# Patient Record
Sex: FEMALE
[De-identification: names, ages, dates, MRNs, and addresses within clinical notes are randomized per-mention and may not be internally consistent; named-entity substitution may affect disease eponyms.]

---

## 2019-10-11 ENCOUNTER — APPOINTMENT (OUTPATIENT)
Dept: HOME HEALTH SERVICES | Facility: HOME HEALTH | Age: 84
End: 2019-10-11

## 2019-10-16 ENCOUNTER — APPOINTMENT (OUTPATIENT)
Dept: HOME HEALTH SERVICES | Facility: HOME HEALTH | Age: 84
End: 2019-10-16
Payer: MEDICARE

## 2019-10-16 VITALS
DIASTOLIC BLOOD PRESSURE: 80 MMHG | WEIGHT: 103 LBS | OXYGEN SATURATION: 98 % | HEART RATE: 61 BPM | HEIGHT: 59 IN | SYSTOLIC BLOOD PRESSURE: 152 MMHG | BODY MASS INDEX: 20.76 KG/M2 | TEMPERATURE: 97.7 F | RESPIRATION RATE: 14 BRPM

## 2019-10-16 DIAGNOSIS — Z81.8 FAMILY HISTORY OF OTHER MENTAL AND BEHAVIORAL DISORDERS: ICD-10-CM

## 2019-10-16 DIAGNOSIS — Z63.4 DISAPPEARANCE AND DEATH OF FAMILY MEMBER: ICD-10-CM

## 2019-10-16 DIAGNOSIS — M47.812 SPONDYLOSIS W/OUT MYELOPATHY OR RADICULOPATHY, CERVICAL REGION: ICD-10-CM

## 2019-10-16 DIAGNOSIS — Z82.3 FAMILY HISTORY OF STROKE: ICD-10-CM

## 2019-10-16 PROCEDURE — 99343: CPT | Mod: 25

## 2019-10-16 PROCEDURE — 90686 IIV4 VACC NO PRSV 0.5 ML IM: CPT

## 2019-10-16 PROCEDURE — G0506: CPT

## 2019-10-16 PROCEDURE — G0008: CPT

## 2019-10-16 SDOH — SOCIAL STABILITY - SOCIAL INSECURITY: DISSAPEARANCE AND DEATH OF FAMILY MEMBER: Z63.4

## 2019-10-16 NOTE — REASON FOR VISIT
[Initial Eval - Existing Diagnosis] : an initial evaluation of an existing diagnosis [Family Member] : family member [Formal Caregiver] : formal caregiver

## 2019-10-16 NOTE — COUNSELING
[Normal Weight - ( BMI  <25 )] : normal weight - ( BMI  <25 ) [Continue diet as tolerated] : continue diet as tolerated based on goals of care [Non - Smoker] : non-smoker [] : colonoscopy [Minimize unnecessary interventions] : minimize unnecessary interventions [Maintain functional ability] : maintain functional ability [Discussed disease trajectory with patient/caregiver] : discussed disease trajectory with patient/caregiver [Patient/Caregiver has ___ understanding of disease process] : patient/caregiver has [unfilled] understanding of disease process [Completed Medical Orders for Life-Sustaining Treatment] : completed medical orders for life-sustaining treatment [DNR] : Code Status: DNR [Comfort] : Treatment Guidelines: Comfort [DNI] : Intubation: DNI

## 2019-10-17 PROBLEM — M47.812 OSTEOARTHRITIS OF NECK: Status: ACTIVE | Noted: 2019-10-16

## 2019-10-17 NOTE — PHYSICAL EXAM
[No Acute Distress] : no acute distress [Well Developed] : well developed [Normal Sclera/Conjunctiva] : normal sclera/conjunctiva [Normal Outer Ear/Nose] : the ears and nose were normal in appearance [Normal Oropharynx] : the oropharynx was normal [No JVD] : no jugular venous distention [Supple] : the neck was supple [No Respiratory Distress] : no respiratory distress [Clear to Auscultation] : lungs were clear to auscultation bilaterally [No Accessory Muscle Use] : no accessory muscle use [Normal Rate] : heart rate was normal  [Regular Rhythm] : with a regular rhythm [Normal S1, S2] : normal S1 and S2 [No Murmurs] : no murmurs heard [No Edema] : there was no peripheral edema [Normal Appearance] : normal in appearance [No Nipple Discharge] : no nipple discharge [Normal Bowel Sounds] : normal bowel sounds [Non Tender] : non-tender [Soft] : abdomen soft [Not Distended] : not distended [No Masses] : no abdominal mass palpated [No Rash] : no rash [Normal Affect] : the affect was normal [de-identified] : Thin, well-groomed [de-identified] : Hard of hearing, few broken teeth [de-identified] : +Few senile purpura upper chest [de-identified] : Unsteady gait [de-identified] : F [de-identified] : A+O x 2 (person, place)

## 2019-10-17 NOTE — HEALTH RISK ASSESSMENT
[No falls in past year] : Patient reported no falls in the past year [No] : The patient does not have visual impairment [HRA Reviewed] : Health risk assessment reviewed [Independent] : feeding [Some assistance needed] : using telephone [Full assistance needed] : managing finances [de-identified] : using walker (per dtr typically declines use of walker and walks faster without it) [TimeGetUpGo] : 45

## 2019-10-17 NOTE — HISTORY OF PRESENT ILLNESS
[Patient] : patient [FreeTextEntry1] : Gait instability, dementia [FreeTextEntry2] : 89yo female with h/o mild dementia (since 2001, declined medications for this), HTN, anxiety/depression (not on medications, was on xanax in past but had adverse reaction), CAD s/p 2 stents (2003, [facility], not on ASA or plavix), gait instability (uses walker), hearing impairment (declined hearing aides), being seen for initial [facility] visit.\par \par Last hospitalization was in 7/2018 for fall, was getting out of bed alone and fell. She had an ER visit 7/2019 for headache. Last labs were done in [facility]. \par \par Presently she is feeling well. She spends time cleaning home, has aide as well. She is originally from [place], worked as seamstress. She is , two of her adult children live nearby. She has limited appetite, eats healthy diet, limited meat, fruits and vegetables. No constipation or diarrhea, no urinary incontinence. No chest pain or SOB. Has had rash - small red spots intermittently on chest and back. No swelling. She has not fallen since 7/2018, uses rolling walker intermittently, sometimes carries walker around instead of using for stability. Sleeps well except when getting up to urinate.\par \par Has h/o anxiety and depression - has had whole adult life. She cries at night often. Per dtr, pt used to keep herself extremely occupied, walking long distances, cleaning, to deal w anxiety. No impact on sleep, no impact on guilt, energy good, enjoys being with family. \par \par Dtr notes memory problems starting 2001, pt started wandering at that time, has been rec to take meds for memory, pt averse to medications and declined.\par _______________________________________\par \par [facility] results 7/3/19: \par \par Head CT: Mild atrophy, mild to mod small vessel ischemic changes, intracranial arterial calcifications\par WBC 9.0\par Hgb 15.3\par Plt 169\par INR 0.99\par Na+ 133\par K+ 4.4\par Cl 99\par CO2 26\par BUN 23\par Cr 0.9\par eGFR 63\par B12 567\par

## 2019-11-15 ENCOUNTER — TRANSCRIPTION ENCOUNTER (OUTPATIENT)
Age: 84
End: 2019-11-15

## 2019-11-15 DIAGNOSIS — Z86.69 PERSONAL HISTORY OF OTHER DISEASES OF THE NERVOUS SYSTEM AND SENSE ORGANS: ICD-10-CM

## 2019-12-02 ENCOUNTER — APPOINTMENT (OUTPATIENT)
Dept: HOME HEALTH SERVICES | Facility: HOME HEALTH | Age: 84
End: 2019-12-02

## 2020-01-10 ENCOUNTER — APPOINTMENT (OUTPATIENT)
Dept: HOME HEALTH SERVICES | Facility: HOME HEALTH | Age: 85
End: 2020-01-10
Payer: MEDICARE

## 2020-01-10 VITALS
SYSTOLIC BLOOD PRESSURE: 180 MMHG | TEMPERATURE: 98.8 F | RESPIRATION RATE: 12 BRPM | HEART RATE: 82 BPM | OXYGEN SATURATION: 98 % | DIASTOLIC BLOOD PRESSURE: 110 MMHG

## 2020-01-10 VITALS — SYSTOLIC BLOOD PRESSURE: 168 MMHG | DIASTOLIC BLOOD PRESSURE: 100 MMHG

## 2020-01-10 PROCEDURE — 99349 HOME/RES VST EST MOD MDM 40: CPT

## 2020-01-10 RX ORDER — OFLOXACIN 3 MG/ML
0.3 SOLUTION/ DROPS OPHTHALMIC 4 TIMES DAILY
Qty: 1 | Refills: 0 | Status: DISCONTINUED | COMMUNITY
Start: 2019-11-15 | End: 2020-01-10

## 2020-01-10 NOTE — CURRENT MEDS
[Medication and Allergies Reconciled] : medication and allergies reconciled [Reviewed patient reported medication adherence from Comprehensive Assessment] : Reviewed patient reported medication adherence from comprehensive assessment [High Risk Medications Reviewed and Reconciled (Beers Criteria)] : high risk medications reviewed and reconciled [Adherent to medications] : Patient is adherent to medications as prescribed

## 2020-01-10 NOTE — HISTORY OF PRESENT ILLNESS
[Patient] : patient [Formal Caregiver] : formal caregiver [FreeTextEntry1] : Gait instability, dementia [FreeTextEntry2] : 89yo female with h/o mild dementia (since 2001, declined medications for this), HTN, anxiety/depression (not on medications, was on xanax in past but had adverse reaction), CAD s/p 2 stents (2003, [facility], not on ASA or plavix), gait instability (uses walker), hearing impairment (declined hearing aides), being seen for follow-up visit.\par \par She has been doing well. Gets pain in L hip, chronic. Has had since she fell 2y ago. Sleeps okay night except when she is worried. She has not fallen recently but gait is a bit unstable. Legs are not swollen, no rashes. Bowels are regular, no constipation. No urinary issues. Getting PT in the home. Uses walker occasionally in the home, not at night. Gets pain in L foot at times, ronn when cold.\par \par Aide notes memory problems worsening. Today during visit pt insisting that her dtr Randi who is at visit as well was her mother. \par \par Accompanied by aides Nikky and Brionna.\par _______________________________________\par \par [facility] results 7/3/19: \par \par Head CT: Mild atrophy, mild to mod small vessel ischemic changes, intracranial arterial calcifications\par WBC 9.0\par Hgb 15.3\par Plt 169\par INR 0.99\par Na+ 133\par K+ 4.4\par Cl 99\par CO2 26\par BUN 23\par Cr 0.9\par eGFR 63\par B12 567\par

## 2020-01-10 NOTE — REASON FOR VISIT
[Family Member] : family member [Formal Caregiver] : formal caregiver [Follow-Up] : a follow-up visit

## 2020-01-10 NOTE — HEALTH RISK ASSESSMENT
[HRA Reviewed] : Health risk assessment reviewed [Independent] : toileting [Some assistance needed] : using telephone [Full assistance needed] : managing medications [No] : The patient does not have visual impairment [No falls in past year] : Patient reported no falls in the past year [TimeGetUpGo] : 45 [de-identified] : using walker (per dtr typically declines use of walker and walks faster without it)

## 2020-01-10 NOTE — CHRONIC CARE ASSESSMENT
[FAST Score: ____] : Functional Assessment Scale (FAST) Score: [unfilled] [PPS Score: ____] : Palliative Performance Scale (PPS) Score: [unfilled] [3 or More Times Per Week] : exercises 3 or more times per week [Walking] : walking [Resistant to using DME in place] : resistant to using DME in place

## 2020-01-10 NOTE — COUNSELING
[Continue diet as tolerated] : continue diet as tolerated based on goals of care [Normal Weight - ( BMI  <25 )] : normal weight - ( BMI  <25 ) [Non - Smoker] : non-smoker [] : colonoscopy [Minimize unnecessary interventions] : minimize unnecessary interventions [Discussed disease trajectory with patient/caregiver] : discussed disease trajectory with patient/caregiver [Maintain functional ability] : maintain functional ability [Patient/Caregiver has ___ understanding of disease process] : patient/caregiver has [unfilled] understanding of disease process [Completed Medical Orders for Life-Sustaining Treatment] : completed medical orders for life-sustaining treatment [DNR] : Code Status: DNR [DNI] : Intubation: DNI [Comfort] : Treatment Guidelines: Comfort [[facility]: [facility]/last verification date: [unfilled]

## 2020-01-10 NOTE — PHYSICAL EXAM
[No Acute Distress] : no acute distress [Well Developed] : well developed [Normal Sclera/Conjunctiva] : normal sclera/conjunctiva [Normal Outer Ear/Nose] : the ears and nose were normal in appearance [Normal Oropharynx] : the oropharynx was normal [No JVD] : no jugular venous distention [Supple] : the neck was supple [No Respiratory Distress] : no respiratory distress [Clear to Auscultation] : lungs were clear to auscultation bilaterally [No Accessory Muscle Use] : no accessory muscle use [Normal Rate] : heart rate was normal  [Regular Rhythm] : with a regular rhythm [No Murmurs] : no murmurs heard [Normal S1, S2] : normal S1 and S2 [No Edema] : there was no peripheral edema [Normal Appearance] : normal in appearance [No Nipple Discharge] : no nipple discharge [Normal Bowel Sounds] : normal bowel sounds [Non Tender] : non-tender [Soft] : abdomen soft [Not Distended] : not distended [No Masses] : no abdominal mass palpated [No Rash] : no rash [Normal Affect] : the affect was normal [de-identified] : Inverted nipple R side [de-identified] : Thin, well-groomed [de-identified] : Hard of hearing, few broken teeth [de-identified] : Unsteady gait [de-identified] : +Few senile purpura upper chest [de-identified] : A+O x 2 (person, place)

## 2020-01-10 NOTE — DATA REVIEWED
[FreeTextEntry1] : Head CT: Mild atrophy, mild to mod small vessel ischemic changes, intracranial arterial calcifications\par WBC 9.0\par Hgb 15.3\par Plt 169\par INR 0.99\par Na+ 133\par K+ 4.4\par Cl 99\par CO2 26\par BUN 23\par Cr 0.9\par eGFR 63\par B12 567\par

## 2020-01-11 ENCOUNTER — TRANSCRIPTION ENCOUNTER (OUTPATIENT)
Age: 85
End: 2020-01-11

## 2020-01-12 ENCOUNTER — TRANSCRIPTION ENCOUNTER (OUTPATIENT)
Age: 85
End: 2020-01-12

## 2020-01-12 ENCOUNTER — EMERGENCY (EMERGENCY)
Facility: HOSPITAL | Age: 85
LOS: 1 days | Discharge: LEFT BEFORE DISCHARGE | End: 2020-01-12
Admitting: EMERGENCY MEDICINE

## 2020-01-12 VITALS
OXYGEN SATURATION: 99 % | RESPIRATION RATE: 16 BRPM | SYSTOLIC BLOOD PRESSURE: 193 MMHG | TEMPERATURE: 97 F | HEART RATE: 102 BPM | DIASTOLIC BLOOD PRESSURE: 88 MMHG

## 2020-01-12 NOTE — ED ADULT NURSE NOTE - NS ED NURSE DISCH DISPOSITION
2nd attempt to contact patient to schedule cataract surgery.  When here for exam he stated he would be traveling to [place] for the holidays.  Writer will try to contact patient after the holidays.   
LVM regarding cataract surgery scheduling.  Requested a call back to schedule surgery.   
Left without being seen (saw a nurse but never saw a physician or midlevel provider)

## 2020-01-12 NOTE — ED ADULT TRIAGE NOTE - CHIEF COMPLAINT QUOTE
Patient c/o fall today. Patient was in the bathroom and lost balance when getting off the toilet. Patient reports she yelled for her aid and aid found patient laying in the tub with her legs up. Denies any LOC, dizziness or chest pain prior to fall. Denies any blood thinners. Per daughter, patient is at baseline mental status. Hx. hard of hearing, HTN.

## 2020-01-13 ENCOUNTER — APPOINTMENT (OUTPATIENT)
Dept: HOME HEALTH SERVICES | Facility: HOME HEALTH | Age: 85
End: 2020-01-13

## 2020-01-13 VITALS
SYSTOLIC BLOOD PRESSURE: 140 MMHG | TEMPERATURE: 97.9 F | RESPIRATION RATE: 14 BRPM | OXYGEN SATURATION: 99 % | DIASTOLIC BLOOD PRESSURE: 82 MMHG | HEART RATE: 88 BPM

## 2020-04-20 ENCOUNTER — RX RENEWAL (OUTPATIENT)
Age: 85
End: 2020-04-20

## 2020-06-08 ENCOUNTER — RX RENEWAL (OUTPATIENT)
Age: 85
End: 2020-06-08

## 2020-06-25 ENCOUNTER — APPOINTMENT (OUTPATIENT)
Dept: HOME HEALTH SERVICES | Facility: HOME HEALTH | Age: 85
End: 2020-06-25

## 2020-10-05 ENCOUNTER — APPOINTMENT (OUTPATIENT)
Dept: HOME HEALTH SERVICES | Facility: HOME HEALTH | Age: 85
End: 2020-10-05

## 2020-10-16 ENCOUNTER — APPOINTMENT (OUTPATIENT)
Dept: HOME HEALTH SERVICES | Facility: HOME HEALTH | Age: 85
End: 2020-10-16
Payer: MEDICARE

## 2020-10-16 VITALS
RESPIRATION RATE: 12 BRPM | TEMPERATURE: 98.2 F | DIASTOLIC BLOOD PRESSURE: 90 MMHG | HEART RATE: 78 BPM | SYSTOLIC BLOOD PRESSURE: 160 MMHG | OXYGEN SATURATION: 98 %

## 2020-10-16 PROCEDURE — G0008: CPT

## 2020-10-16 PROCEDURE — 90662 IIV NO PRSV INCREASED AG IM: CPT

## 2020-10-16 PROCEDURE — 99350 HOME/RES VST EST HIGH MDM 60: CPT | Mod: 25

## 2020-10-16 RX ORDER — NYSTATIN 100000 [USP'U]/G
100000 CREAM TOPICAL 3 TIMES DAILY
Qty: 3 | Refills: 3 | Status: DISCONTINUED | COMMUNITY
Start: 2020-09-14 | End: 2020-10-16

## 2020-10-16 NOTE — PHYSICAL EXAM
[No Acute Distress] : no acute distress [Well Developed] : well developed [Normal Outer Ear/Nose] : the ears and nose were normal in appearance [Normal Sclera/Conjunctiva] : normal sclera/conjunctiva [Normal Oropharynx] : the oropharynx was normal [No JVD] : no jugular venous distention [Supple] : the neck was supple [Clear to Auscultation] : lungs were clear to auscultation bilaterally [No Accessory Muscle Use] : no accessory muscle use [No Respiratory Distress] : no respiratory distress [Normal Rate] : heart rate was normal  [Normal S1, S2] : normal S1 and S2 [Regular Rhythm] : with a regular rhythm [No Murmurs] : no murmurs heard [No Edema] : there was no peripheral edema [No Nipple Discharge] : no nipple discharge [Normal Bowel Sounds] : normal bowel sounds [Soft] : abdomen soft [Non Tender] : non-tender [Not Distended] : not distended [No Masses] : no abdominal mass palpated [Normal TMs] : both tympanic membranes were normal [No Skin Lesions] : no skin lesions [de-identified] : Thin, well-groomed [de-identified] : Hard of hearing, few broken teeth, small amt cerumen removed R canal [de-identified] : Mild candida L breast [de-identified] : Unsteady gait [de-identified] : mild candida under L breast [de-identified] : A+O x 2 (person, place), repeats herself in conversation, anxious affect

## 2020-10-16 NOTE — HEALTH RISK ASSESSMENT
[HRA Reviewed] : Health risk assessment reviewed [Independent] : feeding [Full assistance needed] : managing medications [No] : The patient does not have visual impairment [No falls in past year] : Patient reported no falls in the past year [Some assistance needed] : toileting [TimeGetUpGo] : 45 [de-identified] : using walker (per dtr typically declines use of walker and walks faster without it)

## 2020-10-16 NOTE — CHRONIC CARE ASSESSMENT
[Resistant to using DME in place] : resistant to using DME in place [3 or More Times Per Week] : exercises 3 or more times per week [Walking] : walking [PPS Score: ____] : Palliative Performance Scale (PPS) Score: [unfilled] [FAST Score: ____] : Functional Assessment Scale (FAST) Score: [unfilled]

## 2020-10-16 NOTE — HISTORY OF PRESENT ILLNESS
[Patient] : patient [Formal Caregiver] : formal caregiver [FreeTextEntry1] : Gait instability, dementia [FreeTextEntry2] : 89yo female with h/o mild dementia (since 2001, declined medications for this), HTN, anxiety/depression (not on medications, was on xanax in past but had adverse reaction), CAD s/p 2 stents (2003, [facility], not on ASA or plavix), gait instability (uses walker), hearing impairment (declined hearing aides), being seen for acute visit for follow-up and flu shot administration.\par \par Patient denies fever, cough, trouble breathing, rash, vomiting and diarrhea. Patient has not been in close contact with someone who is COVID positive. \par N95 mask, gloves, eye wear and gown (if indicated) used during visit: [Y]. \par Total face to face time with patient is 60 min.\par \par Appetite is not great - made worse by anxiety which she has often. When she eats she is eating small portions. Unable to sleep at night, thinking of things she is worried about. Has some behavioral disturbances - walks around agitated and swearing at times, at times is pleasant. Thinks her dtr is her mother consistently, they do share the same name, does not often confuse her son, does refer to her late  as her father. Had one recent fall with injury to scalp. C/o eyes hurting/burning. Now needs assistance w additional ADLs as reflected below. Often refuses to go outdoors even with nice weather, resists bathing at times. Per her dtr her dementia has been getting worse over the past year.\par \par No incontinence. Uses commode at night, regular bathroom in day. She has constipation when she eats more starchy foods, relieved w vegetables. She didn't like prune juice.\par \par She does not sleep well at night due to anxiety - she also naps during day occasionally. Today she is open to trial of medications for anxiety.\par \par Angeline Sanchez and dtr Rachael at visit today.

## 2020-10-16 NOTE — LETTER HEADER
[Care Plan reviewed every ___ weeks] : Care plan reviewed every [unfilled] weeks [Care Plan reviewed and provided to patient/caregiver] : Care plan reviewed and provided to patient/caregiver [Initiation or substantial revisions made to care plan involving mod/high medical decision making for complex CCM] : Initiation or substantial revisions made to care plan involving mod/high medical decision making for complex CCM [Care Plan managed/Care coordinated by: ___] : Care plan managed/Care coordinated by: [unfilled] [Patient/Caregiver agrees to have other providers send summary of their care to this office] : Patient/caregiver agrees to have other providers send summary of their care to this office

## 2020-10-16 NOTE — COUNSELING
[Normal Weight - ( BMI  <25 )] : normal weight - ( BMI  <25 ) [Continue diet as tolerated] : continue diet as tolerated based on goals of care [Non - Smoker] : non-smoker [] : colonoscopy [Minimize unnecessary interventions] : minimize unnecessary interventions [Maintain functional ability] : maintain functional ability [Discussed disease trajectory with patient/caregiver] : discussed disease trajectory with patient/caregiver [Patient/Caregiver has ___ understanding of disease process] : patient/caregiver has [unfilled] understanding of disease process [Completed Medical Orders for Life-Sustaining Treatment] : completed medical orders for life-sustaining treatment [DNR] : Code Status: DNR [Comfort] : Treatment Guidelines: Comfort [DNI] : Intubation: DNI [[facility] Date: _____] [facility]/last verification date: [unfilled]

## 2020-10-27 ENCOUNTER — NON-APPOINTMENT (OUTPATIENT)
Age: 85
End: 2020-10-27

## 2020-11-11 ENCOUNTER — NON-APPOINTMENT (OUTPATIENT)
Age: 85
End: 2020-11-11

## 2020-11-12 ENCOUNTER — NON-APPOINTMENT (OUTPATIENT)
Age: 85
End: 2020-11-12

## 2020-11-16 ENCOUNTER — NON-APPOINTMENT (OUTPATIENT)
Age: 85
End: 2020-11-16

## 2020-11-17 ENCOUNTER — NON-APPOINTMENT (OUTPATIENT)
Age: 85
End: 2020-11-17

## 2020-11-20 ENCOUNTER — TRANSCRIPTION ENCOUNTER (OUTPATIENT)
Age: 85
End: 2020-11-20

## 2020-11-20 ENCOUNTER — NON-APPOINTMENT (OUTPATIENT)
Age: 85
End: 2020-11-20

## 2020-11-21 ENCOUNTER — NON-APPOINTMENT (OUTPATIENT)
Age: 85
End: 2020-11-21

## 2020-11-21 RX ORDER — SERTRALINE 25 MG/1
25 TABLET, FILM COATED ORAL
Qty: 90 | Refills: 0 | Status: DISCONTINUED | COMMUNITY
Start: 2020-10-16 | End: 2020-11-21

## 2020-11-30 ENCOUNTER — APPOINTMENT (OUTPATIENT)
Dept: HOME HEALTH SERVICES | Facility: HOME HEALTH | Age: 85
End: 2020-11-30

## 2020-12-13 ENCOUNTER — RX RENEWAL (OUTPATIENT)
Age: 85
End: 2020-12-13

## 2020-12-17 ENCOUNTER — NON-APPOINTMENT (OUTPATIENT)
Age: 85
End: 2020-12-17

## 2020-12-18 ENCOUNTER — NON-APPOINTMENT (OUTPATIENT)
Age: 85
End: 2020-12-18

## 2020-12-21 PROBLEM — Z86.69 HISTORY OF CONJUNCTIVITIS: Status: RESOLVED | Noted: 2019-11-15 | Resolved: 2020-12-21

## 2021-01-05 ENCOUNTER — NON-APPOINTMENT (OUTPATIENT)
Age: 86
End: 2021-01-05

## 2021-01-17 ENCOUNTER — RX RENEWAL (OUTPATIENT)
Age: 86
End: 2021-01-17

## 2021-01-19 NOTE — ED CLERICAL - NS ED CLERK NOTE PRE-ARRIVAL INFORMATION; ADDITIONAL PRE-ARRIVAL INFORMATION
This patient is enrolled in the [facility] Program and receives comprehensive home-based primary care.    - To obtain additional clinical information , or to discuss any questions or concerns, you can call the [facility] team at [phone], 24 hours a day.    - If discharged, this patient will be followed up by the [facility] team within 2 days.    - If this patient requires admission, please use the hospitalist service.
na

## 2021-02-01 ENCOUNTER — NON-APPOINTMENT (OUTPATIENT)
Age: 86
End: 2021-02-01

## 2021-02-09 ENCOUNTER — NON-APPOINTMENT (OUTPATIENT)
Age: 86
End: 2021-02-09

## 2021-02-12 ENCOUNTER — APPOINTMENT (OUTPATIENT)
Dept: HOME HEALTH SERVICES | Facility: HOME HEALTH | Age: 86
End: 2021-02-12
Payer: MEDICARE

## 2021-02-12 VITALS
RESPIRATION RATE: 12 BRPM | TEMPERATURE: 97 F | HEART RATE: 67 BPM | OXYGEN SATURATION: 97 % | SYSTOLIC BLOOD PRESSURE: 155 MMHG | DIASTOLIC BLOOD PRESSURE: 70 MMHG

## 2021-02-12 DIAGNOSIS — F03.90 UNSPECIFIED DEMENTIA W/OUT BEHAVIORAL DISTURBANCE: ICD-10-CM

## 2021-02-12 PROCEDURE — 99349 HOME/RES VST EST MOD MDM 40: CPT

## 2021-02-12 NOTE — HISTORY OF PRESENT ILLNESS
[Patient] : patient [Formal Caregiver] : formal caregiver [FreeTextEntry1] : Gait instability, dementia [FreeTextEntry2] : 89yo female with h/o mild dementia (since , declined medications for this), HTN, anxiety/depression (not on medications, was on xanax in past but had adverse reaction), CAD s/p 2 stents (, [facility], not on ASA or plavix), gait instability (uses walker), hearing impairment (declined hearing aides), being seen for follow-up visit.\par \par Patient denies fever, cough, trouble breathing, rash, vomiting and diarrhea. Patient has not been in close contact with someone who is COVID positive. \par N95 mask, gloves, eye wear and gown (if indicated) used during visit: [Y]. \par Total face to face time with patient is [45] min.\par \par She feels that she is doing okay. Sometimes she is sad and thinks of people who have  and that makes her sad. Nikky states pt has been crying - worse in evening, somewhat less than a few months ago. She is not sleeping well - wakes 3-4x per night. She wakes up and walks in the kitchen. Appetite is good but when she is sad she doesn't like to eat. Has occ constipation - has hemorrhoids as well. Has mild bleeding intermittently. Eats more fruit when she gets constipated. She does not like prune juice. She feels a bit SOB when she is nervous. She had a fall in her bedroom months ago - she tripped and hit head, she was bleeding, aide took care of the wound, no falls since. \par \par Aide Merceds at visit today. Spoke frank Bryan by phone during visit.

## 2021-02-12 NOTE — PHYSICAL EXAM
[No Acute Distress] : no acute distress [Well Developed] : well developed [Normal Sclera/Conjunctiva] : normal sclera/conjunctiva [Normal Outer Ear/Nose] : the ears and nose were normal in appearance [Normal TMs] : both tympanic membranes were normal [No JVD] : no jugular venous distention [Supple] : the neck was supple [No Respiratory Distress] : no respiratory distress [Clear to Auscultation] : lungs were clear to auscultation bilaterally [No Accessory Muscle Use] : no accessory muscle use [Normal Rate] : heart rate was normal  [Regular Rhythm] : with a regular rhythm [Normal S1, S2] : normal S1 and S2 [No Murmurs] : no murmurs heard [No Edema] : there was no peripheral edema [No Nipple Discharge] : no nipple discharge [Normal Bowel Sounds] : normal bowel sounds [Non Tender] : non-tender [Soft] : abdomen soft [Not Distended] : not distended [No Skin Lesions] : no skin lesions [Normal Appearance] : normal in appearance [No Masses] : no palpable masses [de-identified] : Thin, well-groomed [de-identified] : Hard of hearing [de-identified] : Unsteady gait [de-identified] : Few senile purpura chest [de-identified] : A+O x 2 (person, place), repeats herself in conversation

## 2021-02-12 NOTE — HEALTH RISK ASSESSMENT
[HRA Reviewed] : Health risk assessment reviewed [Independent] : feeding [Some assistance needed] : using telephone [Full assistance needed] : managing finances [No falls in past year] : Patient reported no falls in the past year [No] : The patient does not have visual impairment [TimeGetUpGo] : 45 [de-identified] : using walker (per dtr typically declines use of walker and walks faster without it)

## 2021-02-12 NOTE — CHRONIC CARE ASSESSMENT
[PPS Score: ____] : Palliative Performance Scale (PPS) Score: [unfilled] [FAST Score: ____] : Functional Assessment Scale (FAST) Score: [unfilled] [Resistant to using DME in place] : resistant to using DME in place [3 or More Times Per Week] : exercises 3 or more times per week [Walking] : walking

## 2021-02-12 NOTE — COUNSELING
[Normal Weight - ( BMI  <25 )] : normal weight - ( BMI  <25 ) [Continue diet as tolerated] : continue diet as tolerated based on goals of care [Non - Smoker] : non-smoker [] : colonoscopy [Minimize unnecessary interventions] : minimize unnecessary interventions [Maintain functional ability] : maintain functional ability [Discussed disease trajectory with patient/caregiver] : discussed disease trajectory with patient/caregiver [Patient/Caregiver has ___ understanding of disease process] : patient/caregiver has [unfilled] understanding of disease process [Completed Medical Orders for Life-Sustaining Treatment] : completed medical orders for life-sustaining treatment [DNR] : Code Status: DNR [Comfort] : Treatment Guidelines: Comfort [DNI] : Intubation: DNI [[facility]: _____[facility]/last verification date: [unfilled]

## 2021-02-15 ENCOUNTER — RX RENEWAL (OUTPATIENT)
Age: 86
End: 2021-02-15

## 2021-04-02 ENCOUNTER — NON-APPOINTMENT (OUTPATIENT)
Age: 86
End: 2021-04-02

## 2021-04-06 ENCOUNTER — NON-APPOINTMENT (OUTPATIENT)
Age: 86
End: 2021-04-06

## 2021-04-13 ENCOUNTER — NON-APPOINTMENT (OUTPATIENT)
Age: 86
End: 2021-04-13

## 2021-04-21 ENCOUNTER — NON-APPOINTMENT (OUTPATIENT)
Age: 86
End: 2021-04-21

## 2021-05-14 ENCOUNTER — NON-APPOINTMENT (OUTPATIENT)
Age: 86
End: 2021-05-14

## 2021-05-17 ENCOUNTER — RX RENEWAL (OUTPATIENT)
Age: 86
End: 2021-05-17

## 2021-05-17 ENCOUNTER — NON-APPOINTMENT (OUTPATIENT)
Age: 86
End: 2021-05-17

## 2021-05-21 ENCOUNTER — TRANSCRIPTION ENCOUNTER (OUTPATIENT)
Age: 86
End: 2021-05-21

## 2021-05-24 ENCOUNTER — NON-APPOINTMENT (OUTPATIENT)
Age: 86
End: 2021-05-24

## 2021-06-15 ENCOUNTER — APPOINTMENT (OUTPATIENT)
Dept: HOME HEALTH SERVICES | Facility: HOME HEALTH | Age: 86
End: 2021-06-15
Payer: MEDICARE

## 2021-06-15 ENCOUNTER — NON-APPOINTMENT (OUTPATIENT)
Age: 86
End: 2021-06-15

## 2021-06-15 VITALS
RESPIRATION RATE: 12 BRPM | TEMPERATURE: 97.4 F | SYSTOLIC BLOOD PRESSURE: 160 MMHG | OXYGEN SATURATION: 97 % | DIASTOLIC BLOOD PRESSURE: 90 MMHG | HEART RATE: 63 BPM

## 2021-06-15 DIAGNOSIS — Z23 ENCOUNTER FOR IMMUNIZATION: ICD-10-CM

## 2021-06-15 DIAGNOSIS — M79.673 PAIN IN UNSPECIFIED FOOT: ICD-10-CM

## 2021-06-15 PROCEDURE — 99349 HOME/RES VST EST MOD MDM 40: CPT

## 2021-06-15 NOTE — HEALTH RISK ASSESSMENT
[HRA Reviewed] : Health risk assessment reviewed [Independent] : feeding [Some assistance needed] : using telephone [Full assistance needed] : managing finances [No falls in past year] : Patient reported no falls in the past year [No] : The patient does not have visual impairment [de-identified] : using walker (per dtr typically declines use of walker and walks faster without it) [TimeGetUpGo] : 45

## 2021-06-15 NOTE — REASON FOR VISIT
[Follow-Up] : a follow-up visit [Family Member] : family member [Formal Caregiver] : formal caregiver

## 2021-06-15 NOTE — PHYSICAL EXAM
[No Acute Distress] : no acute distress [Well Developed] : well developed [Normal Sclera/Conjunctiva] : normal sclera/conjunctiva [Normal Outer Ear/Nose] : the ears and nose were normal in appearance [Normal TMs] : both tympanic membranes were normal [No JVD] : no jugular venous distention [Supple] : the neck was supple [No Respiratory Distress] : no respiratory distress [Clear to Auscultation] : lungs were clear to auscultation bilaterally [No Accessory Muscle Use] : no accessory muscle use [Normal Rate] : heart rate was normal  [Regular Rhythm] : with a regular rhythm [Normal S1, S2] : normal S1 and S2 [No Murmurs] : no murmurs heard [No Edema] : there was no peripheral edema [Normal Appearance] : normal in appearance [No Nipple Discharge] : no nipple discharge [Normal Bowel Sounds] : normal bowel sounds [Non Tender] : non-tender [Soft] : abdomen soft [Not Distended] : not distended [No Masses] : no abdominal mass palpated [No Skin Lesions] : no skin lesions [PERRL] : pupils equal, round and reactive to light [EOMI] : extra ocular movement intact [Cranial Nerves Intact] : cranial nerves 2-12 were intact [de-identified] : Hard of hearing [de-identified] : Thin, well-groomed [de-identified] : Unsteady gait, point tenderness over lateral R ribs [de-identified] : Few senile purpura chest [de-identified] : A+O x 2 (person, place), repeats herself in conversation

## 2021-06-15 NOTE — COUNSELING
[Normal Weight - ( BMI  <25 )] : normal weight - ( BMI  <25 ) [Continue diet as tolerated] : continue diet as tolerated based on goals of care [Non - Smoker] : non-smoker [] : colonoscopy [Minimize unnecessary interventions] : minimize unnecessary interventions [Maintain functional ability] : maintain functional ability [Discussed disease trajectory with patient/caregiver] : discussed disease trajectory with patient/caregiver [Patient/Caregiver has ___ understanding of disease process] : patient/caregiver has [unfilled] understanding of disease process [Completed Medical Orders for Life-Sustaining Treatment] : completed medical orders for life-sustaining treatment [DNR] : Code Status: DNR [Comfort] : Treatment Guidelines: Comfort [DNI] : Intubation: DNI [[facility]: _____[facility] Com[facility]/last verification date: [unfilled]

## 2021-06-15 NOTE — HISTORY OF PRESENT ILLNESS
[Patient] : patient [Formal Caregiver] : formal caregiver [FreeTextEntry1] : Gait instability, dementia [FreeTextEntry2] : 89yo female with h/o mild dementia (since 2001, declined medications for this), HTN, anxiety/depression (not on medications, was on xanax in past but had adverse reaction), CAD s/p 2 stents (2003, [facility], not on ASA or plavix), gait instability (uses walker), hearing impairment (declined hearing aides), being seen for follow-up visit.\par \par She c/o pain in R side of abdomen - longstanding. Yesterday had some symptoms of dizziness BP was 170s systolic. Insomnia at night - naps during day. Appetite is good but does not like to eat a lot of veggies. She eats 2 meals per day. No recent falls that aide knows of, one time had bruising to hip. She has been constipated - just got prune juice but has not started it yet. Has been having intermittent behavioral disturbances. She was crying last night. \par \par History provided by dtr and aide, pt unable to provide ROS due to dementia.

## 2021-07-26 ENCOUNTER — NON-APPOINTMENT (OUTPATIENT)
Age: 86
End: 2021-07-26

## 2021-07-29 ENCOUNTER — APPOINTMENT (OUTPATIENT)
Dept: HOME HEALTH SERVICES | Facility: HOME HEALTH | Age: 86
End: 2021-07-29

## 2021-08-02 ENCOUNTER — NON-APPOINTMENT (OUTPATIENT)
Age: 86
End: 2021-08-02

## 2021-08-03 ENCOUNTER — APPOINTMENT (OUTPATIENT)
Dept: HOME HEALTH SERVICES | Facility: HOME HEALTH | Age: 86
End: 2021-08-03
Payer: MEDICARE

## 2021-08-03 VITALS
OXYGEN SATURATION: 98 % | SYSTOLIC BLOOD PRESSURE: 120 MMHG | HEART RATE: 64 BPM | RESPIRATION RATE: 14 BRPM | TEMPERATURE: 97.1 F | DIASTOLIC BLOOD PRESSURE: 60 MMHG

## 2021-08-03 DIAGNOSIS — R21 RASH AND OTHER NONSPECIFIC SKIN ERUPTION: ICD-10-CM

## 2021-08-03 DIAGNOSIS — S22.31XA FRACTURE OF ONE RIB, RIGHT SIDE, INITIAL ENCOUNTER FOR CLOSED FRACTURE: ICD-10-CM

## 2021-08-03 DIAGNOSIS — Z87.898 PERSONAL HISTORY OF OTHER SPECIFIED CONDITIONS: ICD-10-CM

## 2021-08-03 DIAGNOSIS — H04.123 DRY EYE SYNDROME OF BILATERAL LACRIMAL GLANDS: ICD-10-CM

## 2021-08-03 DIAGNOSIS — B30.9 VIRAL CONJUNCTIVITIS, UNSPECIFIED: ICD-10-CM

## 2021-08-03 DIAGNOSIS — K08.89 OTHER SPECIFIED DISORDERS OF TEETH AND SUPPORTING STRUCTURES: ICD-10-CM

## 2021-08-03 DIAGNOSIS — J93.9 PNEUMOTHORAX, UNSPECIFIED: ICD-10-CM

## 2021-08-03 DIAGNOSIS — R07.81 PLEURODYNIA: ICD-10-CM

## 2021-08-03 PROCEDURE — 99349 HOME/RES VST EST MOD MDM 40: CPT

## 2021-08-09 NOTE — COUNSELING
[Normal Weight - ( BMI  <25 )] : normal weight - ( BMI  <25 ) [Continue diet as tolerated] : continue diet as tolerated based on goals of care [Non - Smoker] : non-smoker [] : colonoscopy [Minimize unnecessary interventions] : minimize unnecessary interventions [Maintain functional ability] : maintain functional ability [Discussed disease trajectory with patient/caregiver] : discussed disease trajectory with patient/caregiver [Patient/Caregiver has ___ understanding of disease process] : patient/caregiver has [unfilled] understanding of disease process [Completed Medical Orders for Life-Sustaining Treatment] : completed medical orders for life-sustaining treatment [DNR] : Code Status: DNR [Comfort] : Treatment Guidelines: Comfort [DNI] : Intubation: DNI [[facility] Date: _____] : [facility] Completion/last verification date: [unfilled]

## 2021-08-10 PROBLEM — H04.123 DRY EYES: Status: RESOLVED | Noted: 2020-10-16 | Resolved: 2021-08-10

## 2021-08-10 PROBLEM — B30.9 ACUTE VIRAL CONJUNCTIVITIS OF LEFT EYE: Status: RESOLVED | Noted: 2019-11-15 | Resolved: 2021-08-10

## 2021-08-10 PROBLEM — R21 RASH: Status: RESOLVED | Noted: 2020-09-14 | Resolved: 2021-08-10

## 2021-08-10 PROBLEM — S22.31XA CLOSED FRACTURE OF ONE RIB OF RIGHT SIDE, INITIAL ENCOUNTER: Status: RESOLVED | Noted: 2021-06-15 | Resolved: 2021-08-10

## 2021-08-10 PROBLEM — K08.89 TOOTH ACHE: Status: RESOLVED | Noted: 2020-06-25 | Resolved: 2021-08-10

## 2021-08-10 PROBLEM — R07.81 RIB PAIN ON RIGHT SIDE: Status: RESOLVED | Noted: 2021-06-15 | Resolved: 2021-08-10

## 2021-08-10 PROBLEM — J93.9 PNEUMOTHORAX, LEFT: Status: RESOLVED | Noted: 2021-05-17 | Resolved: 2021-08-10

## 2021-08-10 PROBLEM — Z87.898 HISTORY OF POSTNASAL DRIP: Status: RESOLVED | Noted: 2019-10-16 | Resolved: 2021-08-10

## 2021-08-10 NOTE — PHYSICAL EXAM
[No Acute Distress] : no acute distress [Well Developed] : well developed [Normal Sclera/Conjunctiva] : normal sclera/conjunctiva [PERRL] : pupils equal, round and reactive to light [EOMI] : extra ocular movement intact [Normal Outer Ear/Nose] : the ears and nose were normal in appearance [Normal TMs] : both tympanic membranes were normal [No JVD] : no jugular venous distention [Supple] : the neck was supple [No Respiratory Distress] : no respiratory distress [Clear to Auscultation] : lungs were clear to auscultation bilaterally [No Accessory Muscle Use] : no accessory muscle use [Normal Rate] : heart rate was normal  [Regular Rhythm] : with a regular rhythm [Normal S1, S2] : normal S1 and S2 [No Murmurs] : no murmurs heard [No Edema] : there was no peripheral edema [Normal Appearance] : normal in appearance [No Nipple Discharge] : no nipple discharge [Normal Bowel Sounds] : normal bowel sounds [Non Tender] : non-tender [Soft] : abdomen soft [Not Distended] : not distended [No Masses] : no abdominal mass palpated [No Skin Lesions] : no skin lesions [Cranial Nerves Intact] : cranial nerves 2-12 were intact [de-identified] : Thin, well-groomed [de-identified] : Hard of hearing [de-identified] : Unsteady gait, point tenderness over lateral R ribs [de-identified] : Few senile purpura chest [de-identified] : A+O x 2 (person, place), repeats herself in conversation

## 2021-08-10 NOTE — HISTORY OF PRESENT ILLNESS
[Patient] : patient [Formal Caregiver] : formal caregiver [FreeTextEntry1] : Gait instability, dementia [FreeTextEntry2] : Patient denies fever, cough, trouble breathing, rash, vomiting and diarrhea. Patient has not been in close contact with someone covid positive. \par surgical mask, eye wear used during visit: [Y]. Total face to face time with patient is [30] min.\par \par \par PMH: mild dementia (since 2001, declined medications for this), HTN, anxiety/depression (not on medications, was on xanax in past but had adverse reaction), CAD s/p 2 stents (2003, [facility], not on ASA or plavix), gait instability (uses walker), hearing impairment (declined hearing aides), \par \par Interval events: daughter called office to report pt is agitated and aides are leaving because they cannot control her \par \par Pt alert to name & place reports "I am fine" \par Daughter at visit requesting additional medications to control pt agitation- currently on trazodone; aide at visit reports pt often refuses to take it at night and she has to call the family to come to the home to give it to her-  pt refuses to eat it secondary to the taste as one pill is cut in half; but she will take medications in the am \par \par Daughter also reports pt has never wanted to take pills throughout her life and her father told her that  life with pt would be difficult when he passed away\par Drinks water all day long and goes to bed at 11:45p, wakes up throughout night to drink water and use the toilet and the aides are not getting sleep; pt then wakes up at 7am then falls asleep at the table during breakfast\par \par Of note- pt with similar c/o in the beginning of the year and in patient psych admission was recommended but family refused\par \par HTN: on metoprolol which she will take in the am

## 2021-08-10 NOTE — HEALTH RISK ASSESSMENT
[HRA Reviewed] : Health risk assessment reviewed [Independent] : feeding [Some assistance needed] : using telephone [Full assistance needed] : managing finances [No falls in past year] : Patient reported no falls in the past year [No] : The patient does not have visual impairment [TimeGetUpGo] : 45 [de-identified] : using walker (per dtr typically declines use of walker and walks faster without it)

## 2021-08-10 NOTE — REASON FOR VISIT
[Family Member] : family member [Formal Caregiver] : formal caregiver [Pre-Visit Preparation] : pre-visit preparation was done [Acute] : an acute visit [Intercurrent Specialty/Sub-specialty Visits] : the patient has no intercurrent specialty/sub-specialty visits [FreeTextEntry1] : for agitation

## 2021-08-18 ENCOUNTER — TRANSCRIPTION ENCOUNTER (OUTPATIENT)
Age: 86
End: 2021-08-18

## 2021-08-18 ENCOUNTER — NON-APPOINTMENT (OUTPATIENT)
Age: 86
End: 2021-08-18

## 2021-08-25 ENCOUNTER — RX RENEWAL (OUTPATIENT)
Age: 86
End: 2021-08-25

## 2021-08-25 DIAGNOSIS — J30.2 OTHER SEASONAL ALLERGIC RHINITIS: ICD-10-CM

## 2021-09-08 ENCOUNTER — NON-APPOINTMENT (OUTPATIENT)
Age: 86
End: 2021-09-08

## 2021-09-10 ENCOUNTER — APPOINTMENT (OUTPATIENT)
Dept: HOME HEALTH SERVICES | Facility: HOME HEALTH | Age: 86
End: 2021-09-10
Payer: MEDICARE

## 2021-09-10 VITALS
RESPIRATION RATE: 14 BRPM | TEMPERATURE: 98.5 F | OXYGEN SATURATION: 98 % | SYSTOLIC BLOOD PRESSURE: 140 MMHG | DIASTOLIC BLOOD PRESSURE: 70 MMHG | HEART RATE: 60 BPM

## 2021-09-10 DIAGNOSIS — R26.81 UNSTEADINESS ON FEET: ICD-10-CM

## 2021-09-10 DIAGNOSIS — B37.2 CANDIDIASIS OF SKIN AND NAIL: ICD-10-CM

## 2021-09-10 DIAGNOSIS — Z87.19 PERSONAL HISTORY OF OTHER DISEASES OF THE DIGESTIVE SYSTEM: ICD-10-CM

## 2021-09-10 DIAGNOSIS — I67.2 CEREBRAL ATHEROSCLEROSIS: ICD-10-CM

## 2021-09-10 PROCEDURE — G0008: CPT

## 2021-09-10 PROCEDURE — 90662 IIV NO PRSV INCREASED AG IM: CPT

## 2021-09-10 PROCEDURE — 99349 HOME/RES VST EST MOD MDM 40: CPT | Mod: 25

## 2021-09-10 NOTE — COUNSELING
[Normal Weight - ( BMI  <25 )] : normal weight - ( BMI  <25 ) [Continue diet as tolerated] : continue diet as tolerated based on goals of care [Non - Smoker] : non-smoker [] : colonoscopy [Minimize unnecessary interventions] : minimize unnecessary interventions [Maintain functional ability] : maintain functional ability [Discussed disease trajectory with patient/caregiver] : discussed disease trajectory with patient/caregiver [Patient/Caregiver has ___ understanding of disease process] : patient/caregiver has [unfilled] understanding of disease process [Completed Medical Orders for Life-Sustaining Treatment] : completed medical orders for life-sustaining treatment [DNR] : Code Status: DNR [Comfort] : Treatment Guidelines: Comfort [DNI] : Intubation: DNI [[facility] Date: _____] [facility] Completion/last verification date: [unfilled]

## 2021-09-10 NOTE — HISTORY OF PRESENT ILLNESS
[Patient] : patient [Formal Caregiver] : formal caregiver [FreeTextEntry1] : Gait instability, dementia [FreeTextEntry2] : Patient denies fever, cough, trouble breathing, rash, vomiting and diarrhea. Patient has not been in close contact with someone covid positive. \par surgical mask, eye wear used during visit: [Y]. Total face to face time with patient is [30] min.\par \par PMH: mild dementia (since 2001, declined medications for this), HTN, anxiety/depression (not on medications, was on xanax in past but had adverse reaction), CAD s/p 2 stents (2003, [facility], not on ASA or Plavix), gait instability (uses walker), hearing impairment (declined hearing aides), \par \par Interval events: prescribed lorazepam for agitation but daughter has not used as pt now taking trazodone again but only 1.5 tabs not 2 tabs as recommended on 8/3 but is doing better with her new aides\par \par Pt alert to name & place reports "I am sad"; reports she wants to see her mother; appetite has been decreased - only wants to eat sugary foods and foods that are packaged because she is afraid the food is not clean; daughetr also reports pt depressed- pt reporting same in [place]- not a suicide/homicide risk \par Still ambulating - has had no recent falls\par Continent of bowel & bladder\par Has no skin breakdown \par \par Agitation: as above; pt with similar c/o in the beginning of 2021 and in patient psych admission was recommended but family refused\par \par HTN: on metoprolol which she will take in the am

## 2021-09-10 NOTE — HEALTH RISK ASSESSMENT
[HRA Reviewed] : Health risk assessment reviewed [Independent] : feeding [Some assistance needed] : using telephone [Full assistance needed] : managing finances [No falls in past year] : Patient reported no falls in the past year [No] : The patient does not have visual impairment [TimeGetUpGo] : 45 [de-identified] : using walker (per dtr typically declines use of walker and walks faster without it)

## 2021-09-10 NOTE — PHYSICAL EXAM
[No Acute Distress] : no acute distress [Well Developed] : well developed [Normal Sclera/Conjunctiva] : normal sclera/conjunctiva [PERRL] : pupils equal, round and reactive to light [EOMI] : extra ocular movement intact [Normal Outer Ear/Nose] : the ears and nose were normal in appearance [Normal TMs] : both tympanic membranes were normal [No JVD] : no jugular venous distention [Supple] : the neck was supple [No Respiratory Distress] : no respiratory distress [Clear to Auscultation] : lungs were clear to auscultation bilaterally [No Accessory Muscle Use] : no accessory muscle use [Normal Rate] : heart rate was normal  [Regular Rhythm] : with a regular rhythm [Normal S1, S2] : normal S1 and S2 [No Murmurs] : no murmurs heard [No Edema] : there was no peripheral edema [Normal Appearance] : normal in appearance [No Nipple Discharge] : no nipple discharge [Normal Bowel Sounds] : normal bowel sounds [Non Tender] : non-tender [Soft] : abdomen soft [Not Distended] : not distended [No Masses] : no abdominal mass palpated [No Skin Lesions] : no skin lesions [Cranial Nerves Intact] : cranial nerves 2-12 were intact [de-identified] : Thin, well-groomed; has lost weight [de-identified] : Hard of hearing [de-identified] : Unsteady gait  [de-identified] : Few senile purpura chest [de-identified] : A+O x 2 (person, place), repeats herself in conversation;

## 2021-09-10 NOTE — REVIEW OF SYSTEMS
[Negative] : Heme/Lymph [Depression] : depression [FreeTextEntry2] : as noted in HPI [de-identified] : as noted in HPI

## 2021-09-10 NOTE — REASON FOR VISIT
[Family Member] : family member [Formal Caregiver] : formal caregiver [Pre-Visit Preparation] : pre-visit preparation was done [Follow-Up] : a follow-up visit [Intercurrent Specialty/Sub-specialty Visits] : the patient has no intercurrent specialty/sub-specialty visits [FreeTextEntry1] : for chronic conditions

## 2021-10-26 ENCOUNTER — RX RENEWAL (OUTPATIENT)
Age: 86
End: 2021-10-26

## 2021-11-03 ENCOUNTER — RX RENEWAL (OUTPATIENT)
Age: 86
End: 2021-11-03

## 2021-12-16 ENCOUNTER — TRANSCRIPTION ENCOUNTER (OUTPATIENT)
Age: 86
End: 2021-12-16

## 2021-12-17 ENCOUNTER — NON-APPOINTMENT (OUTPATIENT)
Age: 86
End: 2021-12-17

## 2021-12-23 ENCOUNTER — NON-APPOINTMENT (OUTPATIENT)
Age: 86
End: 2021-12-23

## 2021-12-29 ENCOUNTER — NON-APPOINTMENT (OUTPATIENT)
Age: 86
End: 2021-12-29

## 2021-12-29 LAB — SARS-COV-2 N GENE NPH QL NAA+PROBE: NOT DETECTED

## 2022-01-03 DIAGNOSIS — Z20.822 CONTACT WITH AND (SUSPECTED) EXPOSURE TO COVID-19: ICD-10-CM

## 2022-01-18 ENCOUNTER — NON-APPOINTMENT (OUTPATIENT)
Age: 87
End: 2022-01-18

## 2022-01-21 ENCOUNTER — APPOINTMENT (OUTPATIENT)
Dept: HOME HEALTH SERVICES | Facility: HOME HEALTH | Age: 87
End: 2022-01-21

## 2022-01-28 ENCOUNTER — APPOINTMENT (OUTPATIENT)
Dept: HOME HEALTH SERVICES | Facility: HOME HEALTH | Age: 87
End: 2022-01-28
Payer: MEDICARE

## 2022-01-28 VITALS
RESPIRATION RATE: 14 BRPM | TEMPERATURE: 98.1 F | DIASTOLIC BLOOD PRESSURE: 60 MMHG | HEART RATE: 60 BPM | SYSTOLIC BLOOD PRESSURE: 110 MMHG | OXYGEN SATURATION: 99 %

## 2022-01-28 DIAGNOSIS — Z23 ENCOUNTER FOR IMMUNIZATION: ICD-10-CM

## 2022-01-28 DIAGNOSIS — Z92.29 PERSONAL HISTORY OF OTHER DRUG THERAPY: ICD-10-CM

## 2022-01-28 DIAGNOSIS — D69.2 OTHER NONTHROMBOCYTOPENIC PURPURA: ICD-10-CM

## 2022-01-28 DIAGNOSIS — R23.4 CHANGES IN SKIN TEXTURE: ICD-10-CM

## 2022-01-28 DIAGNOSIS — I25.10 ATHEROSCLEROTIC HEART DISEASE OF NATIVE CORONARY ARTERY W/OUT ANGINA PECTORIS: ICD-10-CM

## 2022-01-28 DIAGNOSIS — F32.A ANXIETY DISORDER, UNSPECIFIED: ICD-10-CM

## 2022-01-28 DIAGNOSIS — Z71.89 OTHER SPECIFIED COUNSELING: ICD-10-CM

## 2022-01-28 DIAGNOSIS — F41.9 ANXIETY DISORDER, UNSPECIFIED: ICD-10-CM

## 2022-01-28 PROCEDURE — 99349 HOME/RES VST EST MOD MDM 40: CPT

## 2022-01-28 RX ORDER — LORAZEPAM 2 MG/ML
2 CONCENTRATE ORAL
Qty: 30 | Refills: 0 | Status: DISCONTINUED | COMMUNITY
Start: 2021-08-18 | End: 2022-01-28

## 2022-01-28 NOTE — PHYSICAL EXAM
[No Acute Distress] : no acute distress [Well Developed] : well developed [Normal Sclera/Conjunctiva] : normal sclera/conjunctiva [PERRL] : pupils equal, round and reactive to light [EOMI] : extra ocular movement intact [Normal Outer Ear/Nose] : the ears and nose were normal in appearance [Normal TMs] : both tympanic membranes were normal [No JVD] : no jugular venous distention [Supple] : the neck was supple [No Respiratory Distress] : no respiratory distress [Clear to Auscultation] : lungs were clear to auscultation bilaterally [No Accessory Muscle Use] : no accessory muscle use [Normal Rate] : heart rate was normal  [Regular Rhythm] : with a regular rhythm [Normal S1, S2] : normal S1 and S2 [No Murmurs] : no murmurs heard [No Edema] : there was no peripheral edema [Normal Appearance] : normal in appearance [No Nipple Discharge] : no nipple discharge [Normal Bowel Sounds] : normal bowel sounds [Non Tender] : non-tender [Soft] : abdomen soft [Not Distended] : not distended [No Masses] : no abdominal mass palpated [No Skin Lesions] : no skin lesions [Cranial Nerves Intact] : cranial nerves 2-12 were intact [de-identified] : Thin, well-groomed;  [de-identified] : Hard of hearing [de-identified] : Unsteady gait  [de-identified] : Few senile purpura chest; (R)  heel with cracked thick dry skin [de-identified] : A+O x 2 (person, place), repeats herself in conversation;

## 2022-01-28 NOTE — COUNSELING
[Normal Weight - ( BMI  <25 )] : normal weight - ( BMI  <25 ) [Non - Smoker] : non-smoker [Minimize unnecessary interventions] : minimize unnecessary interventions [Maintain functional ability] : maintain functional ability [Discussed disease trajectory with patient/caregiver] : discussed disease trajectory with patient/caregiver [Patient/Caregiver has ___ understanding of disease process] : patient/caregiver has [unfilled] understanding of disease process [Completed Medical Orders for Life-Sustaining Treatment] : completed medical orders for life-sustaining treatment [DNR] : Code Status: DNR [Comfort] : Treatment Guidelines: Comfort [DNI] : Intubation: DNI [Continue diet as tolerated] : continue diet as tolerated based on goals of care [] : eye exam [Advanced Directives discussed: ____] : Advanced directives discussed: [unfilled] [Annual Discussion and review of: ___] : Annual discussion and review of [unfilled] [Completed DNR] : completed DNR [[facility] Date: _____] [facility] Completion/last verification date: [unfilled]

## 2022-01-28 NOTE — HISTORY OF PRESENT ILLNESS
[Patient] : patient [Formal Caregiver] : formal caregiver [FreeTextEntry1] : Gait instability, dementia [FreeTextEntry2] : Patient denies fever, cough, trouble breathing, rash, vomiting and diarrhea. Patient has not been in close contact with someone covid positive. \par surgical mask, eye wear used during visit: [Y]. Total face to face time with patient is [30] min.\par \par PMH: mild dementia (since 2001, declined medications for this), HTN, anxiety/depression (not on medications, was on xanax in past but had adverse reaction), CAD s/p 2 stents (2003, [facility], not on ASA or Plavix), gait instability (uses walker), hearing impairment (declined hearing aides), \par \par Interval events: none\par \par Pt alert to name & place with no c/o; aide reports pt with pain on her heel and will not let her touch it; daughter also reports pt with edema of legs; otherwise in usual state of health\par Still ambulating - has had no recent falls\par Continent of bowel & bladder\par Has no skin breakdown \par \par Agitation: better controlled with trazodone 200mg at HS; pt with similar c/o in the beginning of 2021 and in patient psych admission was recommended but family refused\par \par HTN: on metoprolol which she will take in the am

## 2022-01-28 NOTE — REVIEW OF SYSTEMS
[Depression] : depression [Negative] : Heme/Lymph [FreeTextEntry2] : as noted in HPI [de-identified] : as noted in HPI [de-identified] : as noted in HPI

## 2022-01-28 NOTE — HEALTH RISK ASSESSMENT
[HRA Reviewed] : Health risk assessment reviewed [Independent] : feeding [Some assistance needed] : using telephone [Full assistance needed] : managing finances [No falls in past year] : Patient reported no falls in the past year [No] : The patient does not have visual impairment [TimeGetUpGo] : 45 [de-identified] : using walker (per dtr typically declines use of walker and walks faster without it)

## 2022-02-03 ENCOUNTER — NON-APPOINTMENT (OUTPATIENT)
Age: 87
End: 2022-02-03

## 2022-02-11 ENCOUNTER — NON-APPOINTMENT (OUTPATIENT)
Age: 87
End: 2022-02-11

## 2022-03-03 ENCOUNTER — APPOINTMENT (OUTPATIENT)
Dept: VASCULAR SURGERY | Facility: CLINIC | Age: 87
End: 2022-03-03

## 2022-05-04 ENCOUNTER — NON-APPOINTMENT (OUTPATIENT)
Age: 87
End: 2022-05-04

## 2022-05-04 ENCOUNTER — APPOINTMENT (OUTPATIENT)
Dept: HOME HEALTH SERVICES | Facility: HOME HEALTH | Age: 87
End: 2022-05-04
Payer: MEDICARE

## 2022-05-04 VITALS
DIASTOLIC BLOOD PRESSURE: 82 MMHG | SYSTOLIC BLOOD PRESSURE: 130 MMHG | HEART RATE: 62 BPM | TEMPERATURE: 97.8 F | RESPIRATION RATE: 16 BRPM

## 2022-05-04 DIAGNOSIS — Z87.19 PERSONAL HISTORY OF OTHER DISEASES OF THE DIGESTIVE SYSTEM: ICD-10-CM

## 2022-05-04 DIAGNOSIS — I10 ESSENTIAL (PRIMARY) HYPERTENSION: ICD-10-CM

## 2022-05-04 DIAGNOSIS — F51.04 PSYCHOPHYSIOLOGIC INSOMNIA: ICD-10-CM

## 2022-05-04 PROCEDURE — 99349 HOME/RES VST EST MOD MDM 40: CPT

## 2022-05-04 NOTE — COUNSELING
[Normal Weight - ( BMI  <25 )] : normal weight - ( BMI  <25 ) [Continue diet as tolerated] : continue diet as tolerated based on goals of care [Non - Smoker] : non-smoker [] : eye exam [Minimize unnecessary interventions] : minimize unnecessary interventions [Maintain functional ability] : maintain functional ability [Discussed disease trajectory with patient/caregiver] : discussed disease trajectory with patient/caregiver [Patient/Caregiver has ___ understanding of disease process] : patient/caregiver has [unfilled] understanding of disease process [Advanced Directives discussed: ____] : Advanced directives discussed: [unfilled] [Annual Discussion and review of: ___] : Annual discussion and review of [unfilled] [Completed DNR] : completed DNR [Completed Medical Orders for Life-Sustaining Treatment] : completed medical orders for life-sustaining treatment [DNR] : Code Status: DNR [Comfort] : Treatment Guidelines: Comfort [DNI] : Intubation: DNI [[facility] Date: _____] : [facility] Completion/last verification date: [unfilled]

## 2022-05-11 PROBLEM — Z87.19 HISTORY OF CHRONIC CONSTIPATION: Status: RESOLVED | Noted: 2020-10-05 | Resolved: 2022-05-11

## 2022-05-11 PROBLEM — I10 HYPERTENSION, UNSPECIFIED TYPE: Status: ACTIVE | Noted: 2019-10-16

## 2022-05-11 PROBLEM — F51.04 CHRONIC INSOMNIA: Status: ACTIVE | Noted: 2020-01-10

## 2022-05-11 NOTE — REVIEW OF SYSTEMS
[Depression] : depression [Negative] : Heme/Lymph [FreeTextEntry2] : as noted in HPI [de-identified] : as noted in HPI [de-identified] : as noted in HPI

## 2022-05-11 NOTE — PHYSICAL EXAM
[No Acute Distress] : no acute distress [Well Developed] : well developed [Normal Sclera/Conjunctiva] : normal sclera/conjunctiva [PERRL] : pupils equal, round and reactive to light [EOMI] : extra ocular movement intact [Normal Outer Ear/Nose] : the ears and nose were normal in appearance [Normal TMs] : both tympanic membranes were normal [No JVD] : no jugular venous distention [Supple] : the neck was supple [No Respiratory Distress] : no respiratory distress [Clear to Auscultation] : lungs were clear to auscultation bilaterally [No Accessory Muscle Use] : no accessory muscle use [Normal Rate] : heart rate was normal  [Regular Rhythm] : with a regular rhythm [Normal S1, S2] : normal S1 and S2 [No Murmurs] : no murmurs heard [No Edema] : there was no peripheral edema [Normal Appearance] : normal in appearance [No Nipple Discharge] : no nipple discharge [Normal Bowel Sounds] : normal bowel sounds [Non Tender] : non-tender [Soft] : abdomen soft [Not Distended] : not distended [No Masses] : no abdominal mass palpated [No Skin Lesions] : no skin lesions [Cranial Nerves Intact] : cranial nerves 2-12 were intact [de-identified] : Thin, well-groomed;  [de-identified] : Hard of hearing [de-identified] : Unsteady gait  [de-identified] : Few senile purpura chest; [de-identified] : A+O x 2 (person, place), repeats herself in conversation;

## 2022-05-11 NOTE — HEALTH RISK ASSESSMENT
[HRA Reviewed] : Health risk assessment reviewed [Independent] : feeding [Some assistance needed] : using telephone [Full assistance needed] : managing finances [No falls in past year] : Patient reported no falls in the past year [No] : The patient does not have visual impairment [TimeGetUpGo] : 45 [de-identified] : using walker (per dtr typically declines use of walker and walks faster without it)

## 2022-05-11 NOTE — HISTORY OF PRESENT ILLNESS
[Patient] : patient [Formal Caregiver] : formal caregiver [FreeTextEntry1] : Gait instability, dementia [FreeTextEntry2] : Patient denies fever, cough, trouble breathing, rash, vomiting and diarrhea. Patient has not been in close contact with someone covid positive. \par surgical mask, eye wear used during visit: [Y]. Total face to face time with patient is [30] min.\par \par PMH: mild dementia (since 2001, declined medications for this), HTN, anxiety/depression (not on medications, was on xanax in past but had adverse reaction), CAD s/p 2 stents (2003, [facility], not on ASA or Plavix), gait instability (uses walker), hearing impairment (declined hearing aides), \par \par Interval events: none\par \par A&Ox1-2 refused exam on arrival then went to bedroom to lie down; allowed assessment after speaking to aide; daughter via phone reports pt in usual state of health, remains difficult and family is considering placement to long term care facility \par Still has occasional edema of legs - does not keep them elevated\par Still ambulating - has had no recent falls\par Continent of bowel & bladder\par Has no skin breakdown \par \par Agitation: as above; trazodone 200mg at HS but often refuses to take two pills; pt with agitation in the beginning of 2021 and in patient psych admission was recommended but family refused\par \par HTN: on metoprolol which she will take in the am

## 2022-07-11 ENCOUNTER — NON-APPOINTMENT (OUTPATIENT)
Age: 87
End: 2022-07-11

## 2022-07-21 LAB
APPEARANCE: CLEAR
BACTERIA UR CULT: ABNORMAL
BACTERIA: NEGATIVE
BILIRUBIN URINE: NEGATIVE
BLOOD URINE: NEGATIVE
COLOR: YELLOW
GLUCOSE QUALITATIVE U: NEGATIVE
HYALINE CASTS: 0 /LPF
KETONES URINE: NEGATIVE
LEUKOCYTE ESTERASE URINE: ABNORMAL
MICROSCOPIC-UA: NORMAL
NITRITE URINE: NEGATIVE
PH URINE: 6
PROTEIN URINE: ABNORMAL
RED BLOOD CELLS URINE: 6 /HPF
SPECIFIC GRAVITY URINE: 1.02
SQUAMOUS EPITHELIAL CELLS: 1 /HPF
UROBILINOGEN URINE: NORMAL
WHITE BLOOD CELLS URINE: 43 /HPF

## 2022-08-26 ENCOUNTER — NON-APPOINTMENT (OUTPATIENT)
Age: 87
End: 2022-08-26

## 2022-08-30 ENCOUNTER — NON-APPOINTMENT (OUTPATIENT)
Age: 87
End: 2022-08-30

## 2022-08-30 ENCOUNTER — INPATIENT (INPATIENT)
Facility: HOSPITAL | Age: 87
LOS: 12 days | Discharge: NURSING, REHAB OR LONG-TERM CARE | End: 2022-09-12
Attending: HOSPITALIST | Admitting: HOSPITALIST

## 2022-08-30 VITALS
TEMPERATURE: 98 F | HEART RATE: 89 BPM | RESPIRATION RATE: 18 BRPM | OXYGEN SATURATION: 98 % | SYSTOLIC BLOOD PRESSURE: 140 MMHG | DIASTOLIC BLOOD PRESSURE: 80 MMHG

## 2022-08-30 DIAGNOSIS — Z29.9 ENCOUNTER FOR PROPHYLACTIC MEASURES, UNSPECIFIED: ICD-10-CM

## 2022-08-30 DIAGNOSIS — K04.7 PERIAPICAL ABSCESS WITHOUT SINUS: ICD-10-CM

## 2022-08-30 DIAGNOSIS — K08.89 OTHER SPECIFIED DISORDERS OF TEETH AND SUPPORTING STRUCTURES: ICD-10-CM

## 2022-08-30 DIAGNOSIS — I25.10 ATHEROSCLEROTIC HEART DISEASE OF NATIVE CORONARY ARTERY WITHOUT ANGINA PECTORIS: ICD-10-CM

## 2022-08-30 DIAGNOSIS — F03.90 UNSPECIFIED DEMENTIA WITHOUT BEHAVIORAL DISTURBANCE: ICD-10-CM

## 2022-08-30 LAB
ALBUMIN SERPL ELPH-MCNC: 4.1 G/DL — SIGNIFICANT CHANGE UP (ref 3.3–5)
ALP SERPL-CCNC: 73 U/L — SIGNIFICANT CHANGE UP (ref 40–120)
ALT FLD-CCNC: 11 U/L — SIGNIFICANT CHANGE UP (ref 4–33)
ANION GAP SERPL CALC-SCNC: 13 MMOL/L — SIGNIFICANT CHANGE UP (ref 7–14)
APTT BLD: 32.8 SEC — SIGNIFICANT CHANGE UP (ref 27–36.3)
AST SERPL-CCNC: 25 U/L — SIGNIFICANT CHANGE UP (ref 4–32)
BILIRUB SERPL-MCNC: 0.6 MG/DL — SIGNIFICANT CHANGE UP (ref 0.2–1.2)
BLD GP AB SCN SERPL QL: NEGATIVE — SIGNIFICANT CHANGE UP
BUN SERPL-MCNC: 22 MG/DL — SIGNIFICANT CHANGE UP (ref 7–23)
CALCIUM SERPL-MCNC: 9.9 MG/DL — SIGNIFICANT CHANGE UP (ref 8.4–10.5)
CHLORIDE SERPL-SCNC: 101 MMOL/L — SIGNIFICANT CHANGE UP (ref 98–107)
CO2 SERPL-SCNC: 26 MMOL/L — SIGNIFICANT CHANGE UP (ref 22–31)
CREAT SERPL-MCNC: 1.17 MG/DL — SIGNIFICANT CHANGE UP (ref 0.5–1.3)
EGFR: 42 ML/MIN/1.73M2 — LOW
GLUCOSE SERPL-MCNC: 100 MG/DL — HIGH (ref 70–99)
HCT VFR BLD CALC: 45.5 % — HIGH (ref 34.5–45)
HGB BLD-MCNC: 14.5 G/DL — SIGNIFICANT CHANGE UP (ref 11.5–15.5)
INR BLD: 0.98 RATIO — SIGNIFICANT CHANGE UP (ref 0.88–1.16)
MCHC RBC-ENTMCNC: 29.7 PG — SIGNIFICANT CHANGE UP (ref 27–34)
MCHC RBC-ENTMCNC: 31.9 GM/DL — LOW (ref 32–36)
MCV RBC AUTO: 93.2 FL — SIGNIFICANT CHANGE UP (ref 80–100)
NRBC # BLD: 0 /100 WBCS — SIGNIFICANT CHANGE UP (ref 0–0)
NRBC # FLD: 0 K/UL — SIGNIFICANT CHANGE UP (ref 0–0)
PLATELET # BLD AUTO: 117 K/UL — LOW (ref 150–400)
POTASSIUM SERPL-MCNC: 5.2 MMOL/L — SIGNIFICANT CHANGE UP (ref 3.5–5.3)
POTASSIUM SERPL-SCNC: 5.2 MMOL/L — SIGNIFICANT CHANGE UP (ref 3.5–5.3)
PROT SERPL-MCNC: 7.9 G/DL — SIGNIFICANT CHANGE UP (ref 6–8.3)
PROTHROM AB SERPL-ACNC: 11.4 SEC — SIGNIFICANT CHANGE UP (ref 10.5–13.4)
RBC # BLD: 4.88 M/UL — SIGNIFICANT CHANGE UP (ref 3.8–5.2)
RBC # FLD: 14.1 % — SIGNIFICANT CHANGE UP (ref 10.3–14.5)
RH IG SCN BLD-IMP: POSITIVE — SIGNIFICANT CHANGE UP
SODIUM SERPL-SCNC: 140 MMOL/L — SIGNIFICANT CHANGE UP (ref 135–145)
WBC # BLD: 8.63 K/UL — SIGNIFICANT CHANGE UP (ref 3.8–10.5)
WBC # FLD AUTO: 8.63 K/UL — SIGNIFICANT CHANGE UP (ref 3.8–10.5)

## 2022-08-30 PROCEDURE — 99284 EMERGENCY DEPT VISIT MOD MDM: CPT | Mod: GC

## 2022-08-30 PROCEDURE — 99222 1ST HOSP IP/OBS MODERATE 55: CPT

## 2022-08-30 RX ORDER — HALOPERIDOL DECANOATE 100 MG/ML
1 INJECTION INTRAMUSCULAR ONCE
Refills: 0 | Status: COMPLETED | OUTPATIENT
Start: 2022-08-30 | End: 2022-08-30

## 2022-08-30 RX ORDER — OLANZAPINE 15 MG/1
5 TABLET, FILM COATED ORAL ONCE
Refills: 0 | Status: DISCONTINUED | OUTPATIENT
Start: 2022-08-30 | End: 2022-08-30

## 2022-08-30 RX ORDER — HALOPERIDOL DECANOATE 100 MG/ML
1 INJECTION INTRAMUSCULAR EVERY 6 HOURS
Refills: 0 | Status: DISCONTINUED | OUTPATIENT
Start: 2022-08-30 | End: 2022-09-08

## 2022-08-30 RX ORDER — HEPARIN SODIUM 5000 [USP'U]/ML
5000 INJECTION INTRAVENOUS; SUBCUTANEOUS EVERY 12 HOURS
Refills: 0 | Status: DISCONTINUED | OUTPATIENT
Start: 2022-08-30 | End: 2022-09-01

## 2022-08-30 RX ORDER — SODIUM CHLORIDE 9 MG/ML
1000 INJECTION, SOLUTION INTRAVENOUS
Refills: 0 | Status: DISCONTINUED | OUTPATIENT
Start: 2022-08-30 | End: 2022-09-02

## 2022-08-30 RX ORDER — ACETAMINOPHEN 500 MG
650 TABLET ORAL EVERY 6 HOURS
Refills: 0 | Status: DISCONTINUED | OUTPATIENT
Start: 2022-08-30 | End: 2022-09-06

## 2022-08-30 RX ORDER — OLANZAPINE 15 MG/1
2.5 TABLET, FILM COATED ORAL ONCE
Refills: 0 | Status: COMPLETED | OUTPATIENT
Start: 2022-08-30 | End: 2022-08-30

## 2022-08-30 RX ORDER — TRAZODONE HCL 50 MG
100 TABLET ORAL ONCE
Refills: 0 | Status: COMPLETED | OUTPATIENT
Start: 2022-08-30 | End: 2022-08-30

## 2022-08-30 RX ORDER — AMPICILLIN SODIUM AND SULBACTAM SODIUM 250; 125 MG/ML; MG/ML
3 INJECTION, POWDER, FOR SUSPENSION INTRAMUSCULAR; INTRAVENOUS EVERY 6 HOURS
Refills: 0 | Status: DISCONTINUED | OUTPATIENT
Start: 2022-08-31 | End: 2022-08-31

## 2022-08-30 RX ORDER — OLANZAPINE 15 MG/1
5 TABLET, FILM COATED ORAL ONCE
Refills: 0 | Status: COMPLETED | OUTPATIENT
Start: 2022-08-30 | End: 2022-08-30

## 2022-08-30 RX ADMIN — Medication 100 MILLIGRAM(S): at 19:54

## 2022-08-30 RX ADMIN — OLANZAPINE 2.5 MILLIGRAM(S): 15 TABLET, FILM COATED ORAL at 23:42

## 2022-08-30 RX ADMIN — SODIUM CHLORIDE 50 MILLILITER(S): 9 INJECTION, SOLUTION INTRAVENOUS at 19:36

## 2022-08-30 RX ADMIN — HALOPERIDOL DECANOATE 1 MILLIGRAM(S): 100 INJECTION INTRAMUSCULAR at 21:45

## 2022-08-30 RX ADMIN — HALOPERIDOL DECANOATE 1 MILLIGRAM(S): 100 INJECTION INTRAMUSCULAR at 19:36

## 2022-08-30 NOTE — ED ADULT NURSE REASSESSMENT NOTE - NS ED NURSE REASSESS COMMENT FT1
pt able to be redirected s/p meds, comfort and safety measures provider. left AC IV readjusted and secured. pt asking to use restroom, brought to restroom in wheelchair with assistance from PCA.
report received from RN Carolina, pt A&Ox0, agitated, attempting to climb out of bed. family at bedside states pt usually takes Trazadone to sleep at night. IV leaking, pt not allowing RN to adjust IV at this time due to agitation. ACP contacted, admitting MD paged [phone] for meds/possible 1:1 order as family has to leave. charge nurse notified for support staff and patient safety. pt in front of nursing station for additional safety and monitoring.

## 2022-08-30 NOTE — H&P ADULT - NSHPPHYSICALEXAM_GEN_ALL_CORE
PHYSICAL EXAM:  GENERAL: anxious appearing, well-developed, well-nourished  HEAD:  Atraumatic, Normocephalic  EYES: EOMI, PERRLA, conjunctiva and sclera clear  Mouth: +poor dentition, no significant swelling noted.   CHEST/LUNG: Clear to auscultation bilaterally; No wheezes, rales or rhonchi  HEART: Regular rate and rhythm; No murmurs, rubs, or gallops, (+)S1, S2  ABDOMEN: Soft, Nontender, Nondistended; Normal Bowel sounds   EXTREMITIES:  2+ Peripheral Pulses, No clubbing, cyanosis, or edema  PSYCH: anxious  NEUROLOGY: AAOx1, non-focal  SKIN: No rashes or lesions

## 2022-08-30 NOTE — H&P ADULT - PROBLEM SELECTOR PLAN 3
AOx1, more anxious and declining over the last year per daughter. Will need placement after extraction. will consult social work  -will give haldol 1mg IVP for now given anxiety and slightly agitation  -check UA. check EKG for QTC. AOx1, more anxious and declining over the last year per daughter. Will need placement after extraction. will consult social work  -will give haldol 1mg IVP for now given anxiety and slightly agitation  -check UA. check EKG for QTC.  -daughter reports pt is on trazodone 150mg qhs

## 2022-08-30 NOTE — H&P ADULT - PROBLEM SELECTOR PLAN 1
ongoing for almost 2 weeks. Causing difficulty with eating at home due to tooth/mandible pain. Needs extraction under anesthesia given dementia/anxiety  -afebrile with no leukocytosis on arrival  -continue with unasyn per OMFS. plan for extraction this Thurs  -light fluids given poor po intake

## 2022-08-30 NOTE — H&P ADULT - ASSESSMENT
89 y/o woman w/ dementia, CAD s/p stents 2003, and HTN presents to the ER for pain in the Lower R mandible. Admitted for teeth extraction w/ OMFS

## 2022-08-30 NOTE — ED PROVIDER NOTE - PHYSICAL EXAMINATION
GEN - NAD; well appearing; A+O x3   HEAD - NC/AT   ENT: Airway patent, mmm, R lower molar with some necrosis around root/gumline, no erythema or gingival swelling   NECK: Neck supple  PULMONARY - Non labored breathing  EXTREMITIES - moving all four extremities  NEUROLOGIC - alert, speech clear, normal gait  PSYCH -nl mood/affect, nl insight.

## 2022-08-30 NOTE — ED ADULT TRIAGE NOTE - CHIEF COMPLAINT QUOTE
p/t with hx dementia needs a dental extraction due to infection, sent by PMD for possible iv antibiotics and dental work with anesthesia

## 2022-08-30 NOTE — H&P ADULT - NSHPREVIEWOFSYSTEMS_GEN_ALL_CORE
REVIEW OF SYSTEMS:  limited due to dementia and hard of hearing/understanding  CONSTITUTIONAL: No fevers   ENT: +R mandible tooth pain  RESPIRATORY: No shortness of breath  CARDIOVASCULAR: No chest pain   GASTROINTESTINAL: No abdominal pain.

## 2022-08-30 NOTE — ED PROVIDER NOTE - OBJECTIVE STATEMENT
89-year-old female with history of dementia, CAD presents to the ED for dental infection with difficulty getting tooth extraction as outpatient. Patient has had about 10 days of right lower molar pain.  Saw her dentist who referred her to an oral surgeon for extraction.  Saw an oral surgeon who said it needed to be removed but would require anesthesia and anesthesiologist not available until October.  Was placed on clindamycin in the interim.  Denies fevers or facial swelling at home.  History provided by daughter secondary to patient's dementia.

## 2022-08-30 NOTE — CONSULT NOTE ADULT - SUBJECTIVE AND OBJECTIVE BOX
HPI: 89 y/o F w PMH of dementia, CAD s/p 3x stents in 2003 presents to [facility] ED with daughter complaining of pain on the lower right mandible. HPI collected from daughter. Per daughter, pt reports two week hx of dental pain on the lower right. Pt was seen by outside OMFS earlier this week (Dr Paresh BROWNING), who recommended extraction of teeth #7, 8, 9, 10 and 31 under anesthesia. Pt was scheduled for procedure in October and dismissed on PO course of clindamycin. Per daughter, pt has been unable to take PO abx as it is giving her nausea and pt has been vomiting following administration for meds. Pt has also not been eating per daughter.      PMH: Dementia, CAD, osteoporosis (never on bisphosphonates)  PSH: S/p stents x3 (2003)  Meds: Metoprolol, Trazadone  All: NKDA    Physical Exam:    Vital Signs Last 24 Hrs  T(C): 37.1 (30 Aug 2022 16:55), Max: 37.1 (30 Aug 2022 16:55)  T(F): 98.7 (30 Aug 2022 16:55), Max: 98.7 (30 Aug 2022 16:55)  HR: 88 (30 Aug 2022 16:55) (86 - 89)  BP: 146/72 (30 Aug 2022 16:55) (140/80 - 159/80)  BP(mean): --  RR: 17 (30 Aug 2022 16:55) (17 - 18)  SpO2: 100% (30 Aug 2022 16:55) (98% - 100%)    Parameters below as of 30 Aug 2022 16:55  Patient On (Oxygen Delivery Method): room air      General: NAD, pt very anxious, holding on to daughter's arm throughout exam  Extraoral: No swelling, no asymmetry, neck soft, ANDREY 40mm w/o guarding, TMJ FROM  Intraoral: Grossly carious retained roots at teeth #7, 8, 9, 10. #31 exsquisitely tender to palpation, no vestibular swelling, floor of mouth soft/not elevated. Uvula midlined  Neuro: AAOx1  Rad: Unable to take dental panoramic radiograph secondary to pt's mental status    LABS:                        14.5   8.63  )-----------( 117      ( 30 Aug 2022 16:37 )             45.5     08-30    140  |  101  |  22  ----------------------------<  100<H>  5.2   |  26  |  1.17    Ca    9.9      30 Aug 2022 16:37    TPro  7.9  /  Alb  4.1  /  TBili  0.6  /  DBili  x   /  AST  25  /  ALT  11  /  AlkPhos  73  08-30    PT/INR - ( 30 Aug 2022 16:37 )   PT: 11.4 sec;   INR: 0.98 ratio         PTT - ( 30 Aug 2022 16:37 )  PTT:32.8 sec    CAPILLARY BLOOD GLUCOSE        LIVER FUNCTIONS - ( 30 Aug 2022 16:37 )  Alb: 4.1 g/dL / Pro: 7.9 g/dL / ALK PHOS: 73 U/L / ALT: 11 U/L / AST: 25 U/L / GGT: x               RADIOLOGY & ADDITIONAL STUDIES:    None taken

## 2022-08-30 NOTE — ED PROVIDER NOTE - PROGRESS NOTE DETAILS
Chante Neves- spoke to oral surgery, they can take patient to OR for extraction on thursday. spoke to daughter about admission, daughter agrees.

## 2022-08-30 NOTE — H&P ADULT - PROBLEM SELECTOR PLAN 2
s/p stents in 2003. no chest pain reported. Per daughter, pt can ambulate well without any CP nor SOB. no use of asa or blood thinners at home  -on metoprolol at home but unclear of dose, will email [facility] pharmacist  -check EKG

## 2022-08-30 NOTE — ED PROVIDER NOTE - CLINICAL SUMMARY MEDICAL DECISION MAKING FREE TEXT BOX
Patient presents with dental infection requiring extraction.  Currently on p.o. antibiotics.  Afebrile, not acutely ill.  No significant gingival or facial swelling, plan for OMFS evaluation to facilitate for expedited outpatient extraction.

## 2022-08-30 NOTE — H&P ADULT - HISTORY OF PRESENT ILLNESS
89 y/o woman w/ dementia, CAD s/p stents 2003, and HTN presents to the ER for pain in the Lower R mandible. Collateral obtained from daughter and granddaughter at bedside as pt is a poor historian. She has been complaining of R lower tooth pain since last Wednesday and has not been eating much due to pain. She was seen by [facility] who recommended extraction of several teeth under anesthesia as pt is fully dep on ADLs and very anxious/confused, however procedure was scheduled to be done in October. Pt meanwhile already does not eat very much and because of the tooth discomfort has not been taking pills either. Pt denies fever, chest pain or shortness of breath. Per daughter no diarrhea nor dysuria recently. Pt on clindamycin outpatient.

## 2022-08-30 NOTE — H&P ADULT - NSHPLABSRESULTS_GEN_ALL_CORE
14.5   8.63  )-----------( 117      ( 30 Aug 2022 16:37 )             45.5       08-30    140  |  101  |  22  ----------------------------<  100<H>  5.2   |  26  |  1.17    Ca    9.9      30 Aug 2022 16:37    TPro  7.9  /  Alb  4.1  /  TBili  0.6  /  DBili  x   /  AST  25  /  ALT  11  /  AlkPhos  73  08-30            PT/INR - ( 30 Aug 2022 16:37 )   PT: 11.4 sec;   INR: 0.98 ratio         PTT - ( 30 Aug 2022 16:37 )  PTT:32.8 sec    EKG: ordered

## 2022-08-30 NOTE — ED ADULT NURSE NOTE - OBJECTIVE STATEMENT
PT with hx dementia needs a dental extraction due to infection, sent by PMD for possible iv antibiotics and dental work with anesthesia.  No distress noted.  Denies CP, no SOB noted.  Breathing non-labored.  Family present at bedside.  Left 20g IVL in place and tolerating well.  IVL wrapped.  Labs sent.  Safety measures in place.  Will continue to monitor.

## 2022-08-31 ENCOUNTER — NON-APPOINTMENT (OUTPATIENT)
Age: 87
End: 2022-08-31

## 2022-08-31 LAB
ALBUMIN SERPL ELPH-MCNC: 3.6 G/DL — SIGNIFICANT CHANGE UP (ref 3.3–5)
ALP SERPL-CCNC: 62 U/L — SIGNIFICANT CHANGE UP (ref 40–120)
ALT FLD-CCNC: 10 U/L — SIGNIFICANT CHANGE UP (ref 4–33)
ANION GAP SERPL CALC-SCNC: 10 MMOL/L — SIGNIFICANT CHANGE UP (ref 7–14)
APPEARANCE UR: CLEAR — SIGNIFICANT CHANGE UP
AST SERPL-CCNC: 44 U/L — HIGH (ref 4–32)
BASOPHILS # BLD AUTO: 0.06 K/UL — SIGNIFICANT CHANGE UP (ref 0–0.2)
BASOPHILS NFR BLD AUTO: 0.7 % — SIGNIFICANT CHANGE UP (ref 0–2)
BILIRUB DIRECT SERPL-MCNC: <0.2 MG/DL — SIGNIFICANT CHANGE UP (ref 0–0.3)
BILIRUB INDIRECT FLD-MCNC: >0.5 MG/DL — SIGNIFICANT CHANGE UP (ref 0–1)
BILIRUB SERPL-MCNC: 0.7 MG/DL — SIGNIFICANT CHANGE UP (ref 0.2–1.2)
BILIRUB UR-MCNC: NEGATIVE — SIGNIFICANT CHANGE UP
BUN SERPL-MCNC: 15 MG/DL — SIGNIFICANT CHANGE UP (ref 7–23)
CALCIUM SERPL-MCNC: 9.1 MG/DL — SIGNIFICANT CHANGE UP (ref 8.4–10.5)
CHLORIDE SERPL-SCNC: 108 MMOL/L — HIGH (ref 98–107)
CO2 SERPL-SCNC: 25 MMOL/L — SIGNIFICANT CHANGE UP (ref 22–31)
COLOR SPEC: SIGNIFICANT CHANGE UP
COVID-19 NUCLEOCAPSID GAM AB INTERP: NEGATIVE — SIGNIFICANT CHANGE UP
COVID-19 NUCLEOCAPSID TOTAL GAM ANTIBODY RESULT: 0.1 INDEX — SIGNIFICANT CHANGE UP
CREAT SERPL-MCNC: 0.88 MG/DL — SIGNIFICANT CHANGE UP (ref 0.5–1.3)
DIFF PNL FLD: NEGATIVE — SIGNIFICANT CHANGE UP
EGFR: 60 ML/MIN/1.73M2 — SIGNIFICANT CHANGE UP
EOSINOPHIL # BLD AUTO: 0.14 K/UL — SIGNIFICANT CHANGE UP (ref 0–0.5)
EOSINOPHIL NFR BLD AUTO: 1.6 % — SIGNIFICANT CHANGE UP (ref 0–6)
GLUCOSE SERPL-MCNC: 76 MG/DL — SIGNIFICANT CHANGE UP (ref 70–99)
GLUCOSE UR QL: NEGATIVE — SIGNIFICANT CHANGE UP
HCT VFR BLD CALC: 43.2 % — SIGNIFICANT CHANGE UP (ref 34.5–45)
HGB BLD-MCNC: 14.2 G/DL — SIGNIFICANT CHANGE UP (ref 11.5–15.5)
IANC: 5.07 K/UL — SIGNIFICANT CHANGE UP (ref 1.8–7.4)
IMM GRANULOCYTES NFR BLD AUTO: 0.4 % — SIGNIFICANT CHANGE UP (ref 0–1.5)
INR BLD: 1.04 RATIO — SIGNIFICANT CHANGE UP (ref 0.88–1.16)
KETONES UR-MCNC: NEGATIVE — SIGNIFICANT CHANGE UP
LEUKOCYTE ESTERASE UR-ACNC: NEGATIVE — SIGNIFICANT CHANGE UP
LYMPHOCYTES # BLD AUTO: 2.42 K/UL — SIGNIFICANT CHANGE UP (ref 1–3.3)
LYMPHOCYTES # BLD AUTO: 27.1 % — SIGNIFICANT CHANGE UP (ref 13–44)
MAGNESIUM SERPL-MCNC: 1.9 MG/DL — SIGNIFICANT CHANGE UP (ref 1.6–2.6)
MCHC RBC-ENTMCNC: 30.6 PG — SIGNIFICANT CHANGE UP (ref 27–34)
MCHC RBC-ENTMCNC: 32.9 GM/DL — SIGNIFICANT CHANGE UP (ref 32–36)
MCV RBC AUTO: 93.1 FL — SIGNIFICANT CHANGE UP (ref 80–100)
MONOCYTES # BLD AUTO: 1.21 K/UL — HIGH (ref 0–0.9)
MONOCYTES NFR BLD AUTO: 13.5 % — SIGNIFICANT CHANGE UP (ref 2–14)
NEUTROPHILS # BLD AUTO: 5.07 K/UL — SIGNIFICANT CHANGE UP (ref 1.8–7.4)
NEUTROPHILS NFR BLD AUTO: 56.7 % — SIGNIFICANT CHANGE UP (ref 43–77)
NITRITE UR-MCNC: NEGATIVE — SIGNIFICANT CHANGE UP
NRBC # BLD: 0 /100 WBCS — SIGNIFICANT CHANGE UP (ref 0–0)
NRBC # FLD: 0 K/UL — SIGNIFICANT CHANGE UP (ref 0–0)
PH UR: 7 — SIGNIFICANT CHANGE UP (ref 5–8)
PLATELET # BLD AUTO: 100 K/UL — LOW (ref 150–400)
POTASSIUM SERPL-MCNC: 4.2 MMOL/L — SIGNIFICANT CHANGE UP (ref 3.5–5.3)
POTASSIUM SERPL-SCNC: 4.2 MMOL/L — SIGNIFICANT CHANGE UP (ref 3.5–5.3)
PROT SERPL-MCNC: 6.9 G/DL — SIGNIFICANT CHANGE UP (ref 6–8.3)
PROT UR-MCNC: NEGATIVE — SIGNIFICANT CHANGE UP
PROTHROM AB SERPL-ACNC: 12.1 SEC — SIGNIFICANT CHANGE UP (ref 10.5–13.4)
RBC # BLD: 4.64 M/UL — SIGNIFICANT CHANGE UP (ref 3.8–5.2)
RBC # FLD: 14.2 % — SIGNIFICANT CHANGE UP (ref 10.3–14.5)
SARS-COV-2 IGG+IGM SERPL QL IA: 0.1 INDEX — SIGNIFICANT CHANGE UP
SARS-COV-2 IGG+IGM SERPL QL IA: NEGATIVE — SIGNIFICANT CHANGE UP
SARS-COV-2 RNA SPEC QL NAA+PROBE: SIGNIFICANT CHANGE UP
SODIUM SERPL-SCNC: 143 MMOL/L — SIGNIFICANT CHANGE UP (ref 135–145)
SP GR SPEC: 1.01 — SIGNIFICANT CHANGE UP (ref 1.01–1.05)
UROBILINOGEN FLD QL: SIGNIFICANT CHANGE UP
WBC # BLD: 8.94 K/UL — SIGNIFICANT CHANGE UP (ref 3.8–10.5)
WBC # FLD AUTO: 8.94 K/UL — SIGNIFICANT CHANGE UP (ref 3.8–10.5)

## 2022-08-31 PROCEDURE — 76770 US EXAM ABDO BACK WALL COMP: CPT | Mod: 26

## 2022-08-31 PROCEDURE — 99232 SBSQ HOSP IP/OBS MODERATE 35: CPT

## 2022-08-31 RX ORDER — TRAZODONE HCL 50 MG
100 TABLET ORAL ONCE
Refills: 0 | Status: COMPLETED | OUTPATIENT
Start: 2022-08-31 | End: 2022-08-31

## 2022-08-31 RX ORDER — HALOPERIDOL DECANOATE 100 MG/ML
1 INJECTION INTRAMUSCULAR ONCE
Refills: 0 | Status: COMPLETED | OUTPATIENT
Start: 2022-08-31 | End: 2022-08-31

## 2022-08-31 RX ORDER — POLYETHYLENE GLYCOL 3350 17 G/17G
17 POWDER, FOR SOLUTION ORAL DAILY
Refills: 0 | Status: DISCONTINUED | OUTPATIENT
Start: 2022-08-31 | End: 2022-09-12

## 2022-08-31 RX ORDER — AMPICILLIN SODIUM AND SULBACTAM SODIUM 250; 125 MG/ML; MG/ML
3 INJECTION, POWDER, FOR SUSPENSION INTRAMUSCULAR; INTRAVENOUS EVERY 8 HOURS
Refills: 0 | Status: DISCONTINUED | OUTPATIENT
Start: 2022-08-31 | End: 2022-09-12

## 2022-08-31 RX ADMIN — Medication 100 MILLIGRAM(S): at 21:36

## 2022-08-31 RX ADMIN — Medication 650 MILLIGRAM(S): at 14:28

## 2022-08-31 RX ADMIN — AMPICILLIN SODIUM AND SULBACTAM SODIUM 200 GRAM(S): 250; 125 INJECTION, POWDER, FOR SUSPENSION INTRAMUSCULAR; INTRAVENOUS at 14:39

## 2022-08-31 RX ADMIN — HEPARIN SODIUM 5000 UNIT(S): 5000 INJECTION INTRAVENOUS; SUBCUTANEOUS at 21:36

## 2022-08-31 RX ADMIN — Medication 650 MILLIGRAM(S): at 14:58

## 2022-08-31 RX ADMIN — HALOPERIDOL DECANOATE 1 MILLIGRAM(S): 100 INJECTION INTRAMUSCULAR at 01:39

## 2022-08-31 RX ADMIN — AMPICILLIN SODIUM AND SULBACTAM SODIUM 200 GRAM(S): 250; 125 INJECTION, POWDER, FOR SUSPENSION INTRAMUSCULAR; INTRAVENOUS at 21:36

## 2022-08-31 NOTE — PROGRESS NOTE ADULT - ASSESSMENT
89 y/o F w PMH of dementia, CAD s/p 3x stents in 2003 presents to [facility] ED with daughter complaining of pain on the lower right mandible for the last two weeks.  -Pt has multiple non-restorable, grossly carious teeth. Lower right molar tooth #31 currently symptomatic.  -No sign of airway compromise, no intraoral/extraoral swelling or signs of infection spread to any of the fascial spaces    Recs:   -Due to pt's current mental status, extraction of indicated teeth would need to be performed under general anesthesia in the OR  -Given pt's medical comorbidities and social considerations, would recommend extraction of teeth be performed during this hospital stay  -Pt will be added on to go to OR Friday for extraction of any indicated teeth  -Can give Unasyn 3g q6 while admitted  -Soft, non-chew diet   -Please medically optimized pt and document risk stratification prior to OR this week.

## 2022-08-31 NOTE — PATIENT PROFILE ADULT - FUNCTIONAL ASSESSMENT - BASIC MOBILITY 6.
2-calculated by average/Not able to assess (calculate score using [facility] averaging method)

## 2022-08-31 NOTE — PROGRESS NOTE ADULT - ASSESSMENT
89 y/o woman w/ dementia, CAD s/p stents 2003, and HTN presents to the ER for pain in the Lower R mandible. Admitted for teeth extraction w/ OMFS. Found to have acute urinary retention. F/u UA [ ], q 8 hr bladder scans, stool count.

## 2022-08-31 NOTE — PATIENT PROFILE ADULT - FALL HARM RISK - HARM RISK INTERVENTIONS

## 2022-08-31 NOTE — PROGRESS NOTE ADULT - SUBJECTIVE AND OBJECTIVE BOX
HPI: 89 y/o F w PMH of dementia, CAD s/p 3x stents in 2003 presents to [facility] ED with daughter complaining of pain on the lower right mandible. HPI collected from daughter. Per daughter, pt reports two week hx of dental pain on the lower right. Pt was seen by outside OMFS earlier this week (Dr Paresh BROWNING), who recommended extraction of teeth #7, 8, 9, 10 and 31 under anesthesia. Pt was scheduled for procedure in October and dismissed on PO course of clindamycin. Per daughter, pt has been unable to take PO abx as it is giving her nausea and pt has been vomiting following administration for meds. Pt has also not been eating per daughter.      PMH: Dementia, CAD, osteoporosis (never on bisphosphonates)  PSH: S/p stents x3 (2003)  Meds: Metoprolol, Trazadone  All: NKDA    MEDICATIONS  (STANDING):  ampicillin/sulbactam  IVPB 3 Gram(s) IV Intermittent every 6 hours  dextrose 5% + sodium chloride 0.9%. 1000 milliLiter(s) (50 mL/Hr) IV Continuous <Continuous>  heparin   Injectable 5000 Unit(s) SubCutaneous every 12 hours    MEDICATIONS  (PRN):  acetaminophen     Tablet .. 650 milliGRAM(s) Oral every 6 hours PRN Temp greater or equal to 38C (100.4F), Mild Pain (1 - 3)  haloperidol    Injectable 1 milliGRAM(s) IV Push every 6 hours PRN Agitation      Physical Exam:    Vital Signs Last 24 Hrs  T(C): 36.3 (30 Aug 2022 23:56), Max: 37.1 (30 Aug 2022 16:55)  T(F): 97.4 (30 Aug 2022 23:56), Max: 98.7 (30 Aug 2022 16:55)  HR: 86 (31 Aug 2022 04:29) (86 - 106)  BP: 133/69 (31 Aug 2022 04:29) (111/62 - 159/80)  BP(mean): --  RR: 19 (31 Aug 2022 04:29) (17 - 19)  SpO2: 100% (31 Aug 2022 04:29) (98% - 100%)    Parameters below as of 31 Aug 2022 04:29  Patient On (Oxygen Delivery Method): room air      General: NAD  Extraoral: No swelling, no asymmetry, neck soft, ANDREY 40mm w/o guarding, TMJ FROM  Intraoral: Grossly carious retained roots at teeth #7, 8, 9, 10. #31 exsquisitely tender to palpation, no vestibular swelling, floor of mouth soft/not elevated. Uvula midlined  Neuro: AAOx1  Rad: Unable to take dental panoramic radiograph secondary to pt's mental status    LABS:                        14.5   8.63  )-----------( 117      ( 30 Aug 2022 16:37 )             45.5     08-30    140  |  101  |  22  ----------------------------<  100<H>  5.2   |  26  |  1.17    Ca    9.9      30 Aug 2022 16:37    TPro  7.9  /  Alb  4.1  /  TBili  0.6  /  DBili  x   /  AST  25  /  ALT  11  /  AlkPhos  73  08-30

## 2022-08-31 NOTE — PATIENT PROFILE ADULT - VISION (WITH CORRECTIVE LENSES IF THE PATIENT USUALLY WEARS THEM):
unable to assess, pt a&ox0 and uncooperative/Normal vision: sees adequately in most situations; can see medication labels, newsprint

## 2022-08-31 NOTE — CHART NOTE - NSCHARTNOTEFT_GEN_A_CORE
Pt ordered unasyn q 6. Notified by pharmacy based on weight and age, recommendation is q 8 or q 12. Discussed with Dental, will change to q 8 for now.    Greer Cohen NP  [phone]

## 2022-08-31 NOTE — PROGRESS NOTE ADULT - SUBJECTIVE AND OBJECTIVE BOX
[facility] Division of Hospital Medicine  Sasha Pete MD  Pager (M-F, 8A-5P): [phone]  Other Times:  [phone]      SUBJECTIVE / OVERNIGHT EVENTS: Pt speaking in Malian and some English. Thinks she is at home. Not able to follow commands, but is alert.    MEDICATIONS  (STANDING):  ampicillin/sulbactam  IVPB 3 Gram(s) IV Intermittent every 8 hours  dextrose 5% + sodium chloride 0.9%. 1000 milliLiter(s) (50 mL/Hr) IV Continuous <Continuous>  heparin   Injectable 5000 Unit(s) SubCutaneous every 12 hours    MEDICATIONS  (PRN):  acetaminophen     Tablet .. 650 milliGRAM(s) Oral every 6 hours PRN Temp greater or equal to 38C (100.4F), Mild Pain (1 - 3)  haloperidol    Injectable 1 milliGRAM(s) IV Push every 6 hours PRN Agitation      I&O's Summary      PHYSICAL EXAM:  Vital Signs Last 24 Hrs  T(C): 36.5 (31 Aug 2022 11:46), Max: 37.1 (30 Aug 2022 16:55)  T(F): 97.7 (31 Aug 2022 11:46), Max: 98.7 (30 Aug 2022 16:55)  HR: 82 (31 Aug 2022 12:15) (82 - 106)  BP: 158/76 (31 Aug 2022 12:15) (111/62 - 177/98)  BP(mean): --  RR: 18 (31 Aug 2022 12:15) (17 - 19)  SpO2: 98% (31 Aug 2022 12:15) (98% - 100%)    Parameters below as of 31 Aug 2022 12:15  Patient On (Oxygen Delivery Method): room air      CONSTITUTIONAL: NAD, cachectic  EYES: PERRLA; conjunctiva and sclera clear  ENMT: Moist oral mucosa, no pharyngeal injection or exudates; poor dentition  RESPIRATORY: Normal respiratory effort; lungs are clear to auscultation bilaterally  CARDIOVASCULAR: Regular rate and rhythm, normal S1 and S2, no murmur/rub/gallop  ABDOMEN: abd TTP in the suprapubic range, feels mildly distended  PSYCH: A+O to person only  SKIN: No rashes; no palpable lesions    LABS:                        14.2   8.94  )-----------( 100      ( 31 Aug 2022 07:50 )             43.2     08-31    143  |  108<H>  |  15  ----------------------------<  76  4.2   |  25  |  0.88    Ca    9.1      31 Aug 2022 07:50  Mg     1.90     08-31    TPro  6.9  /  Alb  3.6  /  TBili  0.7  /  DBili  <0.2  /  AST  44<H>  /  ALT  10  /  AlkPhos  62  08-31    PT/INR - ( 31 Aug 2022 07:50 )   PT: 12.1 sec;   INR: 1.04 ratio         PTT - ( 30 Aug 2022 16:37 )  PTT:32.8 sec            RADIOLOGY & ADDITIONAL TESTS:  Results Reviewed:   Imaging Personally Reviewed:  Electrocardiogram Personally Reviewed:    COORDINATION OF CARE:  Care Discussed with Consultants/Other Providers [Y/N]:  Prior or Outpatient Records Reviewed [Y/N]:

## 2022-09-01 LAB
ANION GAP SERPL CALC-SCNC: 12 MMOL/L — SIGNIFICANT CHANGE UP (ref 7–14)
ANION GAP SERPL CALC-SCNC: 17 MMOL/L — HIGH (ref 7–14)
BUN SERPL-MCNC: 11 MG/DL — SIGNIFICANT CHANGE UP (ref 7–23)
BUN SERPL-MCNC: 12 MG/DL — SIGNIFICANT CHANGE UP (ref 7–23)
CALCIUM SERPL-MCNC: 9.1 MG/DL — SIGNIFICANT CHANGE UP (ref 8.4–10.5)
CALCIUM SERPL-MCNC: 9.3 MG/DL — SIGNIFICANT CHANGE UP (ref 8.4–10.5)
CHLORIDE SERPL-SCNC: 102 MMOL/L — SIGNIFICANT CHANGE UP (ref 98–107)
CHLORIDE SERPL-SCNC: 106 MMOL/L — SIGNIFICANT CHANGE UP (ref 98–107)
CO2 SERPL-SCNC: 18 MMOL/L — LOW (ref 22–31)
CO2 SERPL-SCNC: 24 MMOL/L — SIGNIFICANT CHANGE UP (ref 22–31)
CREAT SERPL-MCNC: 0.71 MG/DL — SIGNIFICANT CHANGE UP (ref 0.5–1.3)
CREAT SERPL-MCNC: 0.78 MG/DL — SIGNIFICANT CHANGE UP (ref 0.5–1.3)
EGFR: 69 ML/MIN/1.73M2 — SIGNIFICANT CHANGE UP
EGFR: 77 ML/MIN/1.73M2 — SIGNIFICANT CHANGE UP
GLUCOSE SERPL-MCNC: 100 MG/DL — HIGH (ref 70–99)
GLUCOSE SERPL-MCNC: 85 MG/DL — SIGNIFICANT CHANGE UP (ref 70–99)
HCT VFR BLD CALC: 44.1 % — SIGNIFICANT CHANGE UP (ref 34.5–45)
HCT VFR BLD CALC: 45.4 % — HIGH (ref 34.5–45)
HGB BLD-MCNC: 14.5 G/DL — SIGNIFICANT CHANGE UP (ref 11.5–15.5)
HGB BLD-MCNC: 15.4 G/DL — SIGNIFICANT CHANGE UP (ref 11.5–15.5)
MAGNESIUM SERPL-MCNC: 1.8 MG/DL — SIGNIFICANT CHANGE UP (ref 1.6–2.6)
MAGNESIUM SERPL-MCNC: 1.9 MG/DL — SIGNIFICANT CHANGE UP (ref 1.6–2.6)
MCHC RBC-ENTMCNC: 30.1 PG — SIGNIFICANT CHANGE UP (ref 27–34)
MCHC RBC-ENTMCNC: 30.7 PG — SIGNIFICANT CHANGE UP (ref 27–34)
MCHC RBC-ENTMCNC: 32.9 GM/DL — SIGNIFICANT CHANGE UP (ref 32–36)
MCHC RBC-ENTMCNC: 33.9 GM/DL — SIGNIFICANT CHANGE UP (ref 32–36)
MCV RBC AUTO: 90.6 FL — SIGNIFICANT CHANGE UP (ref 80–100)
MCV RBC AUTO: 91.5 FL — SIGNIFICANT CHANGE UP (ref 80–100)
NRBC # BLD: 0 /100 WBCS — SIGNIFICANT CHANGE UP (ref 0–0)
NRBC # FLD: 0 K/UL — SIGNIFICANT CHANGE UP (ref 0–0)
PHOSPHATE SERPL-MCNC: 2.3 MG/DL — LOW (ref 2.5–4.5)
PHOSPHATE SERPL-MCNC: 2.6 MG/DL — SIGNIFICANT CHANGE UP (ref 2.5–4.5)
PLATELET # BLD AUTO: 124 K/UL — LOW (ref 150–400)
PLATELET # BLD AUTO: 128 K/UL — LOW (ref 150–400)
POTASSIUM SERPL-MCNC: 3.8 MMOL/L — SIGNIFICANT CHANGE UP (ref 3.5–5.3)
POTASSIUM SERPL-MCNC: 5.8 MMOL/L — HIGH (ref 3.5–5.3)
POTASSIUM SERPL-SCNC: 3.8 MMOL/L — SIGNIFICANT CHANGE UP (ref 3.5–5.3)
POTASSIUM SERPL-SCNC: 5.8 MMOL/L — HIGH (ref 3.5–5.3)
RBC # BLD: 4.82 M/UL — SIGNIFICANT CHANGE UP (ref 3.8–5.2)
RBC # BLD: 5.01 M/UL — SIGNIFICANT CHANGE UP (ref 3.8–5.2)
RBC # FLD: 14 % — SIGNIFICANT CHANGE UP (ref 10.3–14.5)
RBC # FLD: 14.1 % — SIGNIFICANT CHANGE UP (ref 10.3–14.5)
SODIUM SERPL-SCNC: 137 MMOL/L — SIGNIFICANT CHANGE UP (ref 135–145)
SODIUM SERPL-SCNC: 142 MMOL/L — SIGNIFICANT CHANGE UP (ref 135–145)
WBC # BLD: 13.76 K/UL — HIGH (ref 3.8–10.5)
WBC # BLD: 14.54 K/UL — HIGH (ref 3.8–10.5)
WBC # FLD AUTO: 13.76 K/UL — HIGH (ref 3.8–10.5)
WBC # FLD AUTO: 14.54 K/UL — HIGH (ref 3.8–10.5)

## 2022-09-01 PROCEDURE — 99232 SBSQ HOSP IP/OBS MODERATE 35: CPT

## 2022-09-01 RX ORDER — SODIUM ZIRCONIUM CYCLOSILICATE 10 G/10G
10 POWDER, FOR SUSPENSION ORAL ONCE
Refills: 0 | Status: DISCONTINUED | OUTPATIENT
Start: 2022-09-01 | End: 2022-09-01

## 2022-09-01 RX ORDER — SODIUM,POTASSIUM PHOSPHATES 278-250MG
1 POWDER IN PACKET (EA) ORAL ONCE
Refills: 0 | Status: COMPLETED | OUTPATIENT
Start: 2022-09-01 | End: 2022-09-01

## 2022-09-01 RX ORDER — TRAZODONE HCL 50 MG
100 TABLET ORAL AT BEDTIME
Refills: 0 | Status: DISCONTINUED | OUTPATIENT
Start: 2022-09-01 | End: 2022-09-12

## 2022-09-01 RX ORDER — HEPARIN SODIUM 5000 [USP'U]/ML
5000 INJECTION INTRAVENOUS; SUBCUTANEOUS ONCE
Refills: 0 | Status: COMPLETED | OUTPATIENT
Start: 2022-09-01 | End: 2022-09-01

## 2022-09-01 RX ADMIN — AMPICILLIN SODIUM AND SULBACTAM SODIUM 200 GRAM(S): 250; 125 INJECTION, POWDER, FOR SUSPENSION INTRAMUSCULAR; INTRAVENOUS at 06:24

## 2022-09-01 RX ADMIN — AMPICILLIN SODIUM AND SULBACTAM SODIUM 200 GRAM(S): 250; 125 INJECTION, POWDER, FOR SUSPENSION INTRAMUSCULAR; INTRAVENOUS at 21:43

## 2022-09-01 RX ADMIN — HEPARIN SODIUM 5000 UNIT(S): 5000 INJECTION INTRAVENOUS; SUBCUTANEOUS at 06:24

## 2022-09-01 RX ADMIN — Medication 1 PACKET(S): at 22:39

## 2022-09-01 RX ADMIN — AMPICILLIN SODIUM AND SULBACTAM SODIUM 200 GRAM(S): 250; 125 INJECTION, POWDER, FOR SUSPENSION INTRAMUSCULAR; INTRAVENOUS at 14:26

## 2022-09-01 RX ADMIN — HEPARIN SODIUM 5000 UNIT(S): 5000 INJECTION INTRAVENOUS; SUBCUTANEOUS at 17:58

## 2022-09-01 NOTE — PROGRESS NOTE ADULT - SUBJECTIVE AND OBJECTIVE BOX
HPI: 89 y/o F w PMH of dementia, CAD s/p 3x stents in 2003 presents to [facility] ED with daughter complaining of pain on the lower right mandible. HPI collected from daughter. Per daughter, pt reports two week hx of dental pain on the lower right. Pt was seen by outside OMFS earlier this week (Dr Paresh BROWNING), who recommended extraction of teeth #7, 8, 9, 10 and 31 under anesthesia. Pt was scheduled for procedure in October and dismissed on PO course of clindamycin. Per daughter, pt has been unable to take PO abx as it is giving her nausea and pt has been vomiting following administration for meds. Pt has also not been eating per daughter.      PMH: Dementia, CAD, osteoporosis (never on bisphosphonates)  PSH: S/p stents x3 (2003)  Meds: Metoprolol, Trazadone  All: NKDA    MEDICATIONS  (STANDING):  ampicillin/sulbactam  IVPB 3 Gram(s) IV Intermittent every 8 hours  dextrose 5% + sodium chloride 0.9%. 1000 milliLiter(s) (50 mL/Hr) IV Continuous <Continuous>  heparin   Injectable 5000 Unit(s) SubCutaneous every 12 hours  polyethylene glycol 3350 17 Gram(s) Oral daily    MEDICATIONS  (PRN):  acetaminophen     Tablet .. 650 milliGRAM(s) Oral every 6 hours PRN Temp greater or equal to 38C (100.4F), Mild Pain (1 - 3)  haloperidol    Injectable 1 milliGRAM(s) IV Push every 6 hours PRN Agitation      Physical Exam:  Vital Signs Last 24 Hrs  T(C): 36.6 (01 Sep 2022 06:24), Max: 36.7 (31 Aug 2022 17:12)  T(F): 97.9 (01 Sep 2022 06:24), Max: 98 (31 Aug 2022 17:12)  HR: 101 (01 Sep 2022 06:24) (73 - 105)  BP: 164/84 (01 Sep 2022 06:24) (158/76 - 177/98)  BP(mean): --  RR: 17 (01 Sep 2022 06:24) (17 - 18)  SpO2: 96% (01 Sep 2022 06:24) (96% - 100%)    Parameters below as of 01 Sep 2022 06:24  Patient On (Oxygen Delivery Method): room air      General: NAD  Extraoral: No swelling, no asymmetry, neck soft, ANDREY 40mm w/o guarding, TMJ FROM  Intraoral: Grossly carious retained roots at teeth #7, 8, 9, 10. #31 exsquisitely tender to palpation, no vestibular swelling, floor of mouth soft/not elevated. Uvula midlined  Neuro: AAOx1  Rad: Unable to take dental panoramic radiograph secondary to patients mental status    LABS:                        14.2   8.94  )-----------( 100      ( 31 Aug 2022 07:50 )             43.2     CBC Full  -  ( 31 Aug 2022 07:50 )  WBC Count : 8.94 K/uL  RBC Count : 4.64 M/uL  Hemoglobin : 14.2 g/dL  Hematocrit : 43.2 %  Platelet Count - Automated : 100 K/uL  Mean Cell Volume : 93.1 fL  Mean Cell Hemoglobin : 30.6 pg  Mean Cell Hemoglobin Concentration : 32.9 gm/dL  Auto Neutrophil # : 5.07 K/uL  Auto Lymphocyte # : 2.42 K/uL  Auto Monocyte # : 1.21 K/uL  Auto Eosinophil # : 0.14 K/uL  Auto Basophil # : 0.06 K/uL  Auto Neutrophil % : 56.7 %  Auto Lymphocyte % : 27.1 %  Auto Monocyte % : 13.5 %  Auto Eosinophil % : 1.6 %  Auto Basophil % : 0.7 %

## 2022-09-01 NOTE — PROGRESS NOTE ADULT - ASSESSMENT
89 y/o woman w/ dementia, CAD s/p stents 2003, and HTN presents to the ER for pain in the Lower R mandible. Admitted for teeth extraction w/ OMFS. Found to have acute urinary retention. UA nml, q 8 hr bladder scans, stool count. AAA noted on renal US, f/u CTA abd/pelv [ ].    ***incomplete    RCRI 9/1: *** 89 y/o woman w/ dementia, CAD s/p stents 2003, and HTN presents to the ER for pain in the Lower R mandible. Admitted for teeth extraction w/ OMFS. Found to have acute urinary retention s/p dyer placement 8/31. UA nml, q 8 hr bladder scans, stool count. AAA noted on renal US, family does not want to obtain CTA a/p at this time. Addition of trazodone for agitation nightly.    RCRI 9/1 for low risk surgery, dental extraction: 1 point for CAD, Class II Risk. 6.0% 30 day risk of death, MI or cardiac arrest.     Phone call to daughter Randi on 9/1, no answer.     Labs 9/1 not a reliable sample, specimen hemolyzed. Repeat this afternoon.

## 2022-09-01 NOTE — PHARMACOTHERAPY INTERVENTION NOTE - COMMENTS
Medication list in OMR updated with fill information from outpatient pharmacy ([email or web address]). Dispense dates listed in OMR for reference.   Of Note:   - Metoprolol Tartrate 25mg tablets: prescribed/filled as 1 tablet orally once daily.   - Trazodone 100mg tablets: prescribed/filled as 2 tablets orally once at bedtime, however daughter reports pt takes 150mg at bedtime (?)

## 2022-09-01 NOTE — PROGRESS NOTE ADULT - ASSESSMENT
89 y/o F w PMH of dementia, CAD s/p 3x stents in 2003 presents to [facility] ED with daughter complaining of pain on the lower right mandible for the last two weeks.  -Pt has multiple non-restorable, grossly carious teeth. Lower right molar tooth #31 currently symptomatic.  -No sign of airway compromise, no intraoral/extraoral swelling or signs of infection spread to any of the fascial spaces    Recs:   -Due to pt's current mental status, extraction of indicated teeth would need to be performed under general anesthesia in the OR  -Given pt's medical comorbidities and social considerations, would recommend extraction of teeth be performed during this hospital stay.  -Pt will be added on to go to OR Friday for extraction of any indicated teeth  -Can give Unasyn 3g q6 while admitted  -Soft, non-chew diet   -Please medically optimized pt and document risk stratification prior to OR on Friday.    Usama Simpson DDS   Oral and Maxillofacial Surgery   Pager #[phone]  Available on [employer] Teams

## 2022-09-01 NOTE — CHART NOTE - NSCHARTNOTEFT_GEN_A_CORE
Discussed with emergency contact daughter Rachael Lomax . Family does not wish to pursue CT of abdomen for further evaluation of AAA, and large liver cyst seen in ultrasound of bladder. Risks of AAA communicated to family. Family at this time requesting to only go ahead with tooth extraction and nothing else. Per family, considering age requesting no further work up.    Greer Cohen NP  [phone]

## 2022-09-01 NOTE — PROGRESS NOTE ADULT - SUBJECTIVE AND OBJECTIVE BOX
[facility] Division of Hospital Medicine  Sasha Pete MD  Pager (M-F, 8A-5P): [phone]  Other Times:  [phone]      SUBJECTIVE / OVERNIGHT EVENTS: Pt agitated overnight as per bedside aid. Didn't eat much breakfast. Steele catheter placed for urinary retention.    MEDICATIONS  (STANDING):  ampicillin/sulbactam  IVPB 3 Gram(s) IV Intermittent every 8 hours  bisacodyl 5 milliGRAM(s) Oral at bedtime  dextrose 5% + sodium chloride 0.9%. 1000 milliLiter(s) (50 mL/Hr) IV Continuous <Continuous>  heparin   Injectable 5000 Unit(s) SubCutaneous every 12 hours  polyethylene glycol 3350 17 Gram(s) Oral daily  sodium zirconium cyclosilicate 10 Gram(s) Oral once  traZODone 100 milliGRAM(s) Oral at bedtime    MEDICATIONS  (PRN):  acetaminophen     Tablet .. 650 milliGRAM(s) Oral every 6 hours PRN Temp greater or equal to 38C (100.4F), Mild Pain (1 - 3)  haloperidol    Injectable 1 milliGRAM(s) IV Push every 6 hours PRN Agitation      I&O's Summary    31 Aug 2022 07:01  -  01 Sep 2022 07:00  --------------------------------------------------------  IN: 0 mL / OUT: 900 mL / NET: -900 mL        PHYSICAL EXAM:  Vital Signs Last 24 Hrs  T(C): 36.7 (01 Sep 2022 08:53), Max: 36.7 (31 Aug 2022 17:12)  T(F): 98 (01 Sep 2022 08:53), Max: 98 (31 Aug 2022 17:12)  HR: 98 (01 Sep 2022 08:53) (73 - 105)  BP: 151/83 (01 Sep 2022 08:53) (151/83 - 169/96)  BP(mean): --  RR: 18 (01 Sep 2022 08:53) (17 - 18)  SpO2: 97% (01 Sep 2022 08:53) (96% - 98%)    Parameters below as of 01 Sep 2022 08:53  Patient On (Oxygen Delivery Method): room air    CONSTITUTIONAL: NAD, cachectic  EYES: PERRLA; conjunctiva and sclera clear  ENMT: Moist oral mucosa, no pharyngeal injection or exudates; poor dentition  RESPIRATORY: Normal respiratory effort; lungs are clear to auscultation bilaterally  CARDIOVASCULAR: Regular rate and rhythm, normal S1 and S2, no murmur/rub/gallop  ABDOMEN: abd TTP in the suprapubic range, improved from day prior  PSYCH: A+O to person only  SKIN: No rashes; no palpable lesions    LABS:                        15.4   14.54 )-----------( 128      ( 01 Sep 2022 12:41 )             45.4         137  |  102  |  11  ----------------------------<  100<H>  5.8<H>   |  18<L>  |  0.71    Ca    9.1      01 Sep 2022 12:41  Phos  2.6       Mg     1.90         TPro  6.9  /  Alb  3.6  /  TBili  0.7  /  DBili  <0.2  /  AST  44<H>  /  ALT  10  /  AlkPhos  62  08-31    PT/INR - ( 31 Aug 2022 07:50 )   PT: 12.1 sec;   INR: 1.04 ratio         PTT - ( 30 Aug 2022 16:37 )  PTT:32.8 sec      Urinalysis Basic - ( 31 Aug 2022 17:51 )    Color: Light Yellow / Appearance: Clear / S.011 / pH: x  Gluc: x / Ketone: Negative  / Bili: Negative / Urobili: <2 mg/dL   Blood: x / Protein: Negative / Nitrite: Negative   Leuk Esterase: Negative / RBC: x / WBC x   Sq Epi: x / Non Sq Epi: x / Bacteria: x          RADIOLOGY & ADDITIONAL TESTS:  Results Reviewed:   Imaging Personally Reviewed:  Electrocardiogram Personally Reviewed:    COORDINATION OF CARE:  Care Discussed with Consultants/Other Providers [Y/N]:  Prior or Outpatient Records Reviewed [Y/N]:

## 2022-09-02 LAB
ANION GAP SERPL CALC-SCNC: 11 MMOL/L — SIGNIFICANT CHANGE UP (ref 7–14)
APTT BLD: 30.3 SEC — SIGNIFICANT CHANGE UP (ref 27–36.3)
APTT BLD: 63.1 SEC — HIGH (ref 27–36.3)
BUN SERPL-MCNC: 14 MG/DL — SIGNIFICANT CHANGE UP (ref 7–23)
CALCIUM SERPL-MCNC: 9 MG/DL — SIGNIFICANT CHANGE UP (ref 8.4–10.5)
CHLORIDE SERPL-SCNC: 108 MMOL/L — HIGH (ref 98–107)
CK MB BLD-MCNC: 2.1 % — SIGNIFICANT CHANGE UP (ref 0–2.5)
CK MB CFR SERPL CALC: 2.7 NG/ML — SIGNIFICANT CHANGE UP
CK SERPL-CCNC: 130 U/L — SIGNIFICANT CHANGE UP (ref 25–170)
CO2 SERPL-SCNC: 26 MMOL/L — SIGNIFICANT CHANGE UP (ref 22–31)
CREAT SERPL-MCNC: 0.88 MG/DL — SIGNIFICANT CHANGE UP (ref 0.5–1.3)
EGFR: 60 ML/MIN/1.73M2 — SIGNIFICANT CHANGE UP
GLUCOSE SERPL-MCNC: 71 MG/DL — SIGNIFICANT CHANGE UP (ref 70–99)
HCT VFR BLD CALC: 42.9 % — SIGNIFICANT CHANGE UP (ref 34.5–45)
HCT VFR BLD CALC: 44.2 % — SIGNIFICANT CHANGE UP (ref 34.5–45)
HGB BLD-MCNC: 13.7 G/DL — SIGNIFICANT CHANGE UP (ref 11.5–15.5)
HGB BLD-MCNC: 15 G/DL — SIGNIFICANT CHANGE UP (ref 11.5–15.5)
INR BLD: 1.03 RATIO — SIGNIFICANT CHANGE UP (ref 0.88–1.16)
MAGNESIUM SERPL-MCNC: 1.9 MG/DL — SIGNIFICANT CHANGE UP (ref 1.6–2.6)
MCHC RBC-ENTMCNC: 29.8 PG — SIGNIFICANT CHANGE UP (ref 27–34)
MCHC RBC-ENTMCNC: 30.7 PG — SIGNIFICANT CHANGE UP (ref 27–34)
MCHC RBC-ENTMCNC: 31.9 GM/DL — LOW (ref 32–36)
MCHC RBC-ENTMCNC: 33.9 GM/DL — SIGNIFICANT CHANGE UP (ref 32–36)
MCV RBC AUTO: 90.4 FL — SIGNIFICANT CHANGE UP (ref 80–100)
MCV RBC AUTO: 93.3 FL — SIGNIFICANT CHANGE UP (ref 80–100)
NRBC # BLD: 0 /100 WBCS — SIGNIFICANT CHANGE UP (ref 0–0)
NRBC # FLD: 0 K/UL — SIGNIFICANT CHANGE UP (ref 0–0)
NT-PROBNP SERPL-SCNC: 9517 PG/ML — HIGH
NT-PROBNP SERPL-SCNC: HIGH PG/ML
PHOSPHATE SERPL-MCNC: 2.7 MG/DL — SIGNIFICANT CHANGE UP (ref 2.5–4.5)
PLATELET # BLD AUTO: 117 K/UL — LOW (ref 150–400)
PLATELET # BLD AUTO: 145 K/UL — LOW (ref 150–400)
POTASSIUM SERPL-MCNC: 3.6 MMOL/L — SIGNIFICANT CHANGE UP (ref 3.5–5.3)
POTASSIUM SERPL-SCNC: 3.6 MMOL/L — SIGNIFICANT CHANGE UP (ref 3.5–5.3)
PROTHROM AB SERPL-ACNC: 12 SEC — SIGNIFICANT CHANGE UP (ref 10.5–13.4)
RBC # BLD: 4.6 M/UL — SIGNIFICANT CHANGE UP (ref 3.8–5.2)
RBC # BLD: 4.89 M/UL — SIGNIFICANT CHANGE UP (ref 3.8–5.2)
RBC # FLD: 14.2 % — SIGNIFICANT CHANGE UP (ref 10.3–14.5)
RBC # FLD: 14.3 % — SIGNIFICANT CHANGE UP (ref 10.3–14.5)
SODIUM SERPL-SCNC: 145 MMOL/L — SIGNIFICANT CHANGE UP (ref 135–145)
TROPONIN T, HIGH SENSITIVITY RESULT: 33 NG/L — SIGNIFICANT CHANGE UP
TROPONIN T, HIGH SENSITIVITY RESULT: 42 NG/L — SIGNIFICANT CHANGE UP
WBC # BLD: 12.81 K/UL — HIGH (ref 3.8–10.5)
WBC # BLD: 8.9 K/UL — SIGNIFICANT CHANGE UP (ref 3.8–10.5)
WBC # FLD AUTO: 12.81 K/UL — HIGH (ref 3.8–10.5)
WBC # FLD AUTO: 8.9 K/UL — SIGNIFICANT CHANGE UP (ref 3.8–10.5)

## 2022-09-02 PROCEDURE — 99233 SBSQ HOSP IP/OBS HIGH 50: CPT

## 2022-09-02 PROCEDURE — 71045 X-RAY EXAM CHEST 1 VIEW: CPT | Mod: 26

## 2022-09-02 PROCEDURE — 93306 TTE W/DOPPLER COMPLETE: CPT | Mod: 26

## 2022-09-02 PROCEDURE — 93010 ELECTROCARDIOGRAM REPORT: CPT

## 2022-09-02 RX ORDER — METOPROLOL TARTRATE 50 MG
25 TABLET ORAL DAILY
Refills: 0 | Status: DISCONTINUED | OUTPATIENT
Start: 2022-09-02 | End: 2022-09-03

## 2022-09-02 RX ORDER — METOPROLOL TARTRATE 50 MG
2.5 TABLET ORAL ONCE
Refills: 0 | Status: COMPLETED | OUTPATIENT
Start: 2022-09-02 | End: 2022-09-02

## 2022-09-02 RX ORDER — HEPARIN SODIUM 5000 [USP'U]/ML
2000 INJECTION INTRAVENOUS; SUBCUTANEOUS EVERY 6 HOURS
Refills: 0 | Status: DISCONTINUED | OUTPATIENT
Start: 2022-09-02 | End: 2022-09-02

## 2022-09-02 RX ORDER — METOPROLOL TARTRATE 50 MG
5 TABLET ORAL ONCE
Refills: 0 | Status: COMPLETED | OUTPATIENT
Start: 2022-09-02 | End: 2022-09-02

## 2022-09-02 RX ORDER — HEPARIN SODIUM 5000 [USP'U]/ML
4000 INJECTION INTRAVENOUS; SUBCUTANEOUS EVERY 6 HOURS
Refills: 0 | Status: DISCONTINUED | OUTPATIENT
Start: 2022-09-02 | End: 2022-09-02

## 2022-09-02 RX ORDER — SODIUM CHLORIDE 9 MG/ML
1000 INJECTION INTRAMUSCULAR; INTRAVENOUS; SUBCUTANEOUS
Refills: 0 | Status: DISCONTINUED | OUTPATIENT
Start: 2022-09-02 | End: 2022-09-02

## 2022-09-02 RX ORDER — METOPROLOL TARTRATE 50 MG
12.5 TABLET ORAL DAILY
Refills: 0 | Status: DISCONTINUED | OUTPATIENT
Start: 2022-09-02 | End: 2022-09-02

## 2022-09-02 RX ORDER — ENOXAPARIN SODIUM 100 MG/ML
45 INJECTION SUBCUTANEOUS EVERY 12 HOURS
Refills: 0 | Status: DISCONTINUED | OUTPATIENT
Start: 2022-09-02 | End: 2022-09-04

## 2022-09-02 RX ORDER — HEPARIN SODIUM 5000 [USP'U]/ML
INJECTION INTRAVENOUS; SUBCUTANEOUS
Qty: 25000 | Refills: 0 | Status: DISCONTINUED | OUTPATIENT
Start: 2022-09-02 | End: 2022-09-02

## 2022-09-02 RX ORDER — METOPROLOL TARTRATE 50 MG
25 TABLET ORAL DAILY
Refills: 0 | Status: DISCONTINUED | OUTPATIENT
Start: 2022-09-02 | End: 2022-09-02

## 2022-09-02 RX ADMIN — AMPICILLIN SODIUM AND SULBACTAM SODIUM 200 GRAM(S): 250; 125 INJECTION, POWDER, FOR SUSPENSION INTRAMUSCULAR; INTRAVENOUS at 21:14

## 2022-09-02 RX ADMIN — Medication 5 MILLIGRAM(S): at 20:53

## 2022-09-02 RX ADMIN — Medication 650 MILLIGRAM(S): at 20:53

## 2022-09-02 RX ADMIN — POLYETHYLENE GLYCOL 3350 17 GRAM(S): 17 POWDER, FOR SOLUTION ORAL at 14:30

## 2022-09-02 RX ADMIN — HEPARIN SODIUM 900 UNIT(S)/HR: 5000 INJECTION INTRAVENOUS; SUBCUTANEOUS at 16:47

## 2022-09-02 RX ADMIN — Medication 650 MILLIGRAM(S): at 21:23

## 2022-09-02 RX ADMIN — Medication 5 MILLIGRAM(S): at 21:17

## 2022-09-02 RX ADMIN — AMPICILLIN SODIUM AND SULBACTAM SODIUM 200 GRAM(S): 250; 125 INJECTION, POWDER, FOR SUSPENSION INTRAMUSCULAR; INTRAVENOUS at 05:08

## 2022-09-02 RX ADMIN — Medication 100 MILLIGRAM(S): at 22:22

## 2022-09-02 RX ADMIN — Medication 25 MILLIGRAM(S): at 15:06

## 2022-09-02 RX ADMIN — ENOXAPARIN SODIUM 45 MILLIGRAM(S): 100 INJECTION SUBCUTANEOUS at 18:21

## 2022-09-02 RX ADMIN — AMPICILLIN SODIUM AND SULBACTAM SODIUM 200 GRAM(S): 250; 125 INJECTION, POWDER, FOR SUSPENSION INTRAMUSCULAR; INTRAVENOUS at 14:30

## 2022-09-02 RX ADMIN — HEPARIN SODIUM 900 UNIT(S)/HR: 5000 INJECTION INTRAVENOUS; SUBCUTANEOUS at 14:59

## 2022-09-02 RX ADMIN — Medication 2.5 MILLIGRAM(S): at 18:30

## 2022-09-02 NOTE — PROGRESS NOTE ADULT - SUBJECTIVE AND OBJECTIVE BOX
HPI: 89 y/o F w PMH of dementia, CAD s/p 3x stents in 2003 presents to [facility] ED with daughter complaining of pain on the lower right mandible. HPI collected from daughter. Per daughter, pt reports two week hx of dental pain on the lower right. Pt was seen by outside OMFS earlier this week (Dr Paresh BROWNING), who recommended extraction of teeth #4, 5, 7, 8, 9, 10, 31 under anesthesia. Pt was scheduled for procedure in October and dismissed on PO course of clindamycin. Per daughter, pt has been unable to take PO abx as it is giving her nausea and pt has been vomiting following administration for meds. Pt has also not been eating per daughter.      PMH: Dementia, CAD, osteoporosis (never on bisphosphonates)  PSH: S/p stents x3 (2003)  Meds: Metoprolol, Trazadone  All: NKDA    MEDICATIONS  (STANDING):  ampicillin/sulbactam  IVPB 3 Gram(s) IV Intermittent every 8 hours  bisacodyl 5 milliGRAM(s) Oral at bedtime  dextrose 5% + sodium chloride 0.9%. 1000 milliLiter(s) (50 mL/Hr) IV Continuous <Continuous>  polyethylene glycol 3350 17 Gram(s) Oral daily  traZODone 100 milliGRAM(s) Oral at bedtime    MEDICATIONS  (PRN):  acetaminophen     Tablet .. 650 milliGRAM(s) Oral every 6 hours PRN Temp greater or equal to 38C (100.4F), Mild Pain (1 - 3)  haloperidol    Injectable 1 milliGRAM(s) IV Push every 6 hours PRN Agitation    Physical exam:  General: NAD  Extraoral: No swelling, no asymmetry, neck soft, ANDREY 40mm w/o guarding, TMJ FROM  Intraoral: Grossly carious retained roots at teeth #4, 5, 7, 8, 9, 10, 31 exquisitely tender to palpation, no vestibular swelling, floor of mouth soft/not elevated. Uvula midline  Neuro: AAOx1  Rad: Unable to take dental panoramic radiograph secondary to patients mental status      Vital Signs Last 24 Hrs  T(C): 36.6 (02 Sep 2022 05:00), Max: 36.7 (01 Sep 2022 08:53)  T(F): 97.9 (02 Sep 2022 05:00), Max: 98 (01 Sep 2022 08:53)  HR: 95 (02 Sep 2022 05:00) (81 - 98)  BP: 143/82 (02 Sep 2022 05:00) (130/87 - 151/83)  BP(mean): --  RR: 18 (02 Sep 2022 05:00) (18 - 18)  SpO2: 98% (02 Sep 2022 05:00) (97% - 98%)    Parameters below as of 02 Sep 2022 05:00  Patient On (Oxygen Delivery Method): room air  LABS:                                   14.5   13.76 )-----------( 124      ( 01 Sep 2022 20:45 )             44.1     CBC Full  -  ( 01 Sep 2022 20:45 )  WBC Count : 13.76 K/uL  RBC Count : 4.82 M/uL  Hemoglobin : 14.5 g/dL  Hematocrit : 44.1 %  Platelet Count - Automated : 124 K/uL  Mean Cell Volume : 91.5 fL  Mean Cell Hemoglobin : 30.1 pg  Mean Cell Hemoglobin Concentration : 32.9 gm/dL  Auto Neutrophil # : x  Auto Lymphocyte # : x  Auto Monocyte # : x  Auto Eosinophil # : x  Auto Basophil # : x  Auto Neutrophil % : x  Auto Lymphocyte % : x  Auto Monocyte % : x  Auto Eosinophil % : x  Auto Basophil % : x

## 2022-09-02 NOTE — CHART NOTE - NSCHARTNOTEFT_GEN_A_CORE
Family concerned about blood draws to maintain therapeutic AC. Will order Lovenox BID. Family okay with lovenox injections.    Greer Cohen NP  [id number]

## 2022-09-02 NOTE — PROGRESS NOTE ADULT - ASSESSMENT
89 y/o woman w/ dementia, CAD s/p stents 2003, and HTN presents to the ER for pain in the Lower R mandible. Admitted for teeth extraction w/ OMFS. Found to have acute urinary retention s/p dyer placement 8/31. UA nml, q 8 hr bladder scans, stool count. AAA (4.8cm) noted on renal US, family does not want to obtain CTA a/p at this time. Addition of trazodone for agitation nightly.    RCRI 9/1 for low risk surgery, dental extraction: 1 point for CAD, Class II Risk. 6.0% 30 day risk of death, MI or cardiac arrest.     # New onset A fib w RVR, 9/2  Family reports NO history of afib on am of 9/2 when discussing. Updated them that we will need to work this up further prior to OR.  She does not follow regularly w cardiology. Does have a hx of stents in 2003, not on asa/statin. Takes metop sux 25 mg daily.   - AC (9/2- ), heparin gtt  - Resume metop sux 25 mg daily PO  - TTE [ ]  - Cont tele  - Goal mag of 2, goal K of 4  - Postpone OR date, discussed w family.  - F/u EKG, trop, pro BNP [ ]    Phone call to daughter Randi on 9/1, no answer. DIscussed on am of 9/2.

## 2022-09-02 NOTE — CHART NOTE - NSCHARTNOTEFT_GEN_A_CORE
Pt with new onset Afib w/ RVR. Tooth extraction cancelled. Discussed with family, want to start rate control medication and anticoagulation. Pt with stable AAA, family refusing CTA for further evaluation. STAT EKG ordered, afib rthythm on tele noted. VS stable. STAT Metoprolol 25mg dose ordered, hep gtt ordered.     Greer Choen NP  [phone]

## 2022-09-02 NOTE — PHYSICAL THERAPY INITIAL EVALUATION ADULT - PERTINENT HX OF CURRENT PROBLEM, REHAB EVAL
89 y/o woman w/ dementia, CAD s/p stents 2003, and HTN presented to the ER for pain in the Lower Right mandible. Admitted for teeth extraction w/ OMFS

## 2022-09-02 NOTE — PROGRESS NOTE ADULT - ASSESSMENT
89 y/o F w PMH of dementia, CAD s/p 3x stents in 2003 presented to [facility] ED with daughter complaining of pain on the lower right mandible for the last two weeks.  - Pt has multiple non-restorable, grossly carious teeth- #4, 5, 7, 8, 9, 10, 31. Lower right molar tooth #31 currently symptomatic.  - No sign of airway compromise, no intraoral/extraoral swelling or signs of infection spread to any of the fascial spaces    Recs:   - Due to pt's current mental status, extraction of indicated teeth, #4, 5, 7, 8, 9, 10, 31, would need to be performed under general anesthesia in the OR  - Given pt's medical comorbidities and social considerations, would recommend extraction of teeth be performed during this hospital stay.  - Pt will be added on to go to OR Friday for extraction of any indicated teeth  - Can give Unasyn 3g q6 while admitted  - Soft, non-chew diet   - Please continue to medically optimized patient for the operating room.     Usama Simpson DDS   Oral and Maxillofacial Surgery   Pager #[phone]  Available on [employer] Teams

## 2022-09-02 NOTE — PROGRESS NOTE ADULT - SUBJECTIVE AND OBJECTIVE BOX
[facility] Division of Hospital Medicine  Sasha Pete MD  Pager (M-F, 8A-5P): [phone]  Other Times:  [phone]      SUBJECTIVE / OVERNIGHT EVENTS: New onset a fib w RVR overnight, pt being wheeled off to OR currently. Says "I don't feel good."    MEDICATIONS  (STANDING):  ampicillin/sulbactam  IVPB 3 Gram(s) IV Intermittent every 8 hours  bisacodyl 5 milliGRAM(s) Oral at bedtime  dextrose 5% + sodium chloride 0.9%. 1000 milliLiter(s) (50 mL/Hr) IV Continuous <Continuous>  metoprolol succinate ER 12.5 milliGRAM(s) Oral daily  polyethylene glycol 3350 17 Gram(s) Oral daily  traZODone 100 milliGRAM(s) Oral at bedtime    MEDICATIONS  (PRN):  acetaminophen     Tablet .. 650 milliGRAM(s) Oral every 6 hours PRN Temp greater or equal to 38C (100.4F), Mild Pain (1 - 3)  haloperidol    Injectable 1 milliGRAM(s) IV Push every 6 hours PRN Agitation      I&O's Summary    01 Sep 2022 07:01  -  02 Sep 2022 07:00  --------------------------------------------------------  IN: 120 mL / OUT: 700 mL / NET: -580 mL        PHYSICAL EXAM:  Vital Signs Last 24 Hrs  T(C): 36.5 (02 Sep 2022 10:59), Max: 36.6 (01 Sep 2022 21:40)  T(F): 97.7 (02 Sep 2022 10:59), Max: 97.9 (02 Sep 2022 05:00)  HR: 92 (02 Sep 2022 10:59) (81 - 95)  BP: 147/89 (02 Sep 2022 10:59) (130/87 - 147/89)  BP(mean): --  RR: 16 (02 Sep 2022 10:59) (16 - 18)  SpO2: 97% (02 Sep 2022 10:59) (97% - 98%)    Parameters below as of 02 Sep 2022 10:59  Patient On (Oxygen Delivery Method): room air      Patient On (Oxygen Delivery Method): room air    CONSTITUTIONAL: NAD, cachectic  EYES: PERRLA; conjunctiva and sclera clear  ENMT: Moist oral mucosa, no pharyngeal injection or exudates; poor dentition  RESPIRATORY: Normal respiratory effort; lungs are clear to auscultation bilaterally  CARDIOVASCULAR: Regular rate and rhythm, normal S1 and S2, no murmur/rub/gallop  ABDOMEN: abd TTP in the suprapubic range, improved from day prior  PSYCH: A+O to person only  SKIN: No rashes; no palpable lesions    LABS:                        13.7   8.90  )-----------( 117      ( 02 Sep 2022 06:40 )             42.9     09-02    145  |  108<H>  |  14  ----------------------------<  71  3.6   |  26  |  0.88    Ca    9.0      02 Sep 2022 06:40  Phos  2.7     -  Mg     1.90                 Urinalysis Basic - ( 31 Aug 2022 17:51 )    Color: Light Yellow / Appearance: Clear / S.011 / pH: x  Gluc: x / Ketone: Negative  / Bili: Negative / Urobili: <2 mg/dL   Blood: x / Protein: Negative / Nitrite: Negative   Leuk Esterase: Negative / RBC: x / WBC x   Sq Epi: x / Non Sq Epi: x / Bacteria: x          RADIOLOGY & ADDITIONAL TESTS:  Results Reviewed:   Imaging Personally Reviewed:  Electrocardiogram Personally Reviewed:    COORDINATION OF CARE:  Care Discussed with Consultants/Other Providers [Y/N]: JI Jones  Prior or Outpatient Records Reviewed [Y/N]:

## 2022-09-02 NOTE — PHYSICAL THERAPY INITIAL EVALUATION ADULT - NSPTDISCHREC_GEN_A_CORE
anticipate Home w/ Home PT to address current functional limitations and impairments and to optimize safety within home environment; pt has a 24 hour live-in home health aide prior to admission

## 2022-09-03 LAB
ANION GAP SERPL CALC-SCNC: 18 MMOL/L — HIGH (ref 7–14)
BUN SERPL-MCNC: 20 MG/DL — SIGNIFICANT CHANGE UP (ref 7–23)
CALCIUM SERPL-MCNC: 9 MG/DL — SIGNIFICANT CHANGE UP (ref 8.4–10.5)
CHLORIDE SERPL-SCNC: 103 MMOL/L — SIGNIFICANT CHANGE UP (ref 98–107)
CO2 SERPL-SCNC: 22 MMOL/L — SIGNIFICANT CHANGE UP (ref 22–31)
CREAT SERPL-MCNC: 0.9 MG/DL — SIGNIFICANT CHANGE UP (ref 0.5–1.3)
EGFR: 58 ML/MIN/1.73M2 — LOW
GLUCOSE SERPL-MCNC: 78 MG/DL — SIGNIFICANT CHANGE UP (ref 70–99)
HCT VFR BLD CALC: 42 % — SIGNIFICANT CHANGE UP (ref 34.5–45)
HGB BLD-MCNC: 13.7 G/DL — SIGNIFICANT CHANGE UP (ref 11.5–15.5)
MAGNESIUM SERPL-MCNC: 1.9 MG/DL — SIGNIFICANT CHANGE UP (ref 1.6–2.6)
MCHC RBC-ENTMCNC: 29.7 PG — SIGNIFICANT CHANGE UP (ref 27–34)
MCHC RBC-ENTMCNC: 32.6 GM/DL — SIGNIFICANT CHANGE UP (ref 32–36)
MCV RBC AUTO: 90.9 FL — SIGNIFICANT CHANGE UP (ref 80–100)
NRBC # BLD: 0 /100 WBCS — SIGNIFICANT CHANGE UP (ref 0–0)
NRBC # FLD: 0 K/UL — SIGNIFICANT CHANGE UP (ref 0–0)
NT-PROBNP SERPL-SCNC: HIGH PG/ML
PHOSPHATE SERPL-MCNC: 2.8 MG/DL — SIGNIFICANT CHANGE UP (ref 2.5–4.5)
PLATELET # BLD AUTO: 128 K/UL — LOW (ref 150–400)
POTASSIUM SERPL-MCNC: 3.5 MMOL/L — SIGNIFICANT CHANGE UP (ref 3.5–5.3)
POTASSIUM SERPL-SCNC: 3.5 MMOL/L — SIGNIFICANT CHANGE UP (ref 3.5–5.3)
RBC # BLD: 4.62 M/UL — SIGNIFICANT CHANGE UP (ref 3.8–5.2)
RBC # FLD: 14.3 % — SIGNIFICANT CHANGE UP (ref 10.3–14.5)
SODIUM SERPL-SCNC: 143 MMOL/L — SIGNIFICANT CHANGE UP (ref 135–145)
WBC # BLD: 10.11 K/UL — SIGNIFICANT CHANGE UP (ref 3.8–10.5)
WBC # FLD AUTO: 10.11 K/UL — SIGNIFICANT CHANGE UP (ref 3.8–10.5)

## 2022-09-03 PROCEDURE — 99233 SBSQ HOSP IP/OBS HIGH 50: CPT

## 2022-09-03 RX ORDER — OXYCODONE HYDROCHLORIDE 5 MG/1
2.5 TABLET ORAL EVERY 6 HOURS
Refills: 0 | Status: DISCONTINUED | OUTPATIENT
Start: 2022-09-03 | End: 2022-09-05

## 2022-09-03 RX ORDER — MAGNESIUM SULFATE 500 MG/ML
1 VIAL (ML) INJECTION ONCE
Refills: 0 | Status: COMPLETED | OUTPATIENT
Start: 2022-09-03 | End: 2022-09-03

## 2022-09-03 RX ORDER — POTASSIUM CHLORIDE 20 MEQ
40 PACKET (EA) ORAL ONCE
Refills: 0 | Status: COMPLETED | OUTPATIENT
Start: 2022-09-03 | End: 2022-09-03

## 2022-09-03 RX ORDER — METOPROLOL TARTRATE 50 MG
25 TABLET ORAL
Refills: 0 | Status: DISCONTINUED | OUTPATIENT
Start: 2022-09-03 | End: 2022-09-04

## 2022-09-03 RX ORDER — OXYCODONE HYDROCHLORIDE 5 MG/1
2.5 TABLET ORAL ONCE
Refills: 0 | Status: DISCONTINUED | OUTPATIENT
Start: 2022-09-03 | End: 2022-09-03

## 2022-09-03 RX ADMIN — Medication 100 MILLIGRAM(S): at 21:57

## 2022-09-03 RX ADMIN — Medication 100 GRAM(S): at 14:10

## 2022-09-03 RX ADMIN — AMPICILLIN SODIUM AND SULBACTAM SODIUM 200 GRAM(S): 250; 125 INJECTION, POWDER, FOR SUSPENSION INTRAMUSCULAR; INTRAVENOUS at 13:33

## 2022-09-03 RX ADMIN — AMPICILLIN SODIUM AND SULBACTAM SODIUM 200 GRAM(S): 250; 125 INJECTION, POWDER, FOR SUSPENSION INTRAMUSCULAR; INTRAVENOUS at 23:02

## 2022-09-03 RX ADMIN — Medication 25 MILLIGRAM(S): at 17:50

## 2022-09-03 RX ADMIN — Medication 25 MILLIGRAM(S): at 05:15

## 2022-09-03 RX ADMIN — Medication 650 MILLIGRAM(S): at 17:39

## 2022-09-03 RX ADMIN — POLYETHYLENE GLYCOL 3350 17 GRAM(S): 17 POWDER, FOR SOLUTION ORAL at 12:53

## 2022-09-03 RX ADMIN — ENOXAPARIN SODIUM 45 MILLIGRAM(S): 100 INJECTION SUBCUTANEOUS at 05:15

## 2022-09-03 RX ADMIN — ENOXAPARIN SODIUM 45 MILLIGRAM(S): 100 INJECTION SUBCUTANEOUS at 17:51

## 2022-09-03 RX ADMIN — Medication 5 MILLIGRAM(S): at 21:57

## 2022-09-03 RX ADMIN — AMPICILLIN SODIUM AND SULBACTAM SODIUM 200 GRAM(S): 250; 125 INJECTION, POWDER, FOR SUSPENSION INTRAMUSCULAR; INTRAVENOUS at 05:13

## 2022-09-03 RX ADMIN — Medication 40 MILLIEQUIVALENT(S): at 12:51

## 2022-09-03 RX ADMIN — Medication 650 MILLIGRAM(S): at 16:39

## 2022-09-03 NOTE — PROGRESS NOTE ADULT - ASSESSMENT
89 y/o F w PMH of dementia, CAD s/p 3x stents in 2003 presented to [facility] ED with daughter complaining of pain on the lower right mandible for the last two weeks.  - Pt has multiple non-restorable, grossly carious teeth- #4, 5, 7, 8, 9, 10, 25, 31. Lower right molar tooth #31 currently symptomatic.  - No sign of airway compromise, no intraoral / extraoral swelling or signs of infection spread to any of the fascial spaces    Recs:   - Due to patients current mental status, extraction of indicated teeth, #4, 5, 7, 8, 9, 10, 25, 31, would need to be performed under general anesthesia in the OR.  - Given patients medical comorbidities and social considerations, would recommend extraction of teeth be performed during this hospital stay.  - Pt will be added on to go to OR Friday for extraction of any indicated teeth  - Continue Unasyn 3g q6h while admitted  - Soft non-chew diet   - Please continue to medically optimized patient for the operating room.     Usama Simpson DDS   Oral and Maxillofacial Surgery   Pager #[phone]  Available on [employer] Teams        89 y/o F w PMH of dementia, CAD s/p 3x stents in 2003 presented to [facility] ED with daughter complaining of pain on the lower right mandible for the last two weeks.  - Pt has multiple non-restorable, grossly carious teeth- #4, 5, 7, 8, 9, 10, 25, 31. Lower right molar tooth #31 currently symptomatic.  - No sign of airway compromise, no intraoral / extraoral swelling or signs of infection spread to any of the fascial spaces    Recs:   - Due to patients current mental status, extraction of indicated teeth, #4, 5, 7, 8, 9, 10, 25, 31, would need to be performed under general anesthesia in the OR.  - Given patients medical comorbidities and social considerations, would recommend extraction of teeth be performed during this hospital stay.  - Tentatively planning to add patient on for the OR on 09/06/2022 for extraction of carious non-restorable teeth #4, 5, 7, 8, 9, 10, 25, 31.   - Continue Unasyn 3g q6h while admitted  - Soft non-chew diet   - Please continue to medically optimized patient for the operating room.     Usama Simpson DDS   Oral and Maxillofacial Surgery   Pager #[phone]  Available on [employer] Teams        89 y/o F w PMH of dementia, CAD s/p 3x stents in 2003 presented to [facility] ED with daughter complaining of pain on the lower right mandible for the last two weeks. Pt has multiple non-restorable, grossly carious teeth- #4, 5, 7, 8, 9, 10, 25, 31. Lower right molar tooth #31 currently symptomatic. No sign of airway compromise, no intraoral / extraoral swelling or signs of infection spread to any of the fascial spaces. Patient was scheduled yesterday to go to the OR for removal of carious non-restorable teeth on 9/2. Case was cancelled as patient was noted to be in a-fib w/ RVR. She was started on rate control medication and lovenox anticoagulation. Overnight 9/2 required IV metoprolol for rapid a-fib to 160's.     - Patient will require Cardiology workup and optimization prior to planned procedure in OR. Anticipate OR Tuesday 9/6 for extraction of non-restorable teeth #4, 5, 7, 8, 9, 10, 25, 31.  - Given patients medical comorbidities and social considerations, would recommend extraction of teeth be performed during this hospital stay.  - Continue Unasyn 3g q6h while admitted.   - Soft non-chew diet       Usama Simpson DDS   Oral and Maxillofacial Surgery   Pager #[phone]  Available on [employer] Teams

## 2022-09-03 NOTE — CONSULT NOTE ADULT - NS ATTEND AMEND GEN_ALL_CORE FT
Agree with above. Given advanced age and overall clinical status, there is a high likelihood of underlying CAD. There are some changes in her ECG while tachycardiac, suggesting ischemia (changes primarily in the lateral precordial leads).  There is no role for aggressive cardiac testing, but agree with gently increasing BB to reach a better HR and BP prior to the procedure. Otherwise, no further cardiac testing is required.

## 2022-09-03 NOTE — PROGRESS NOTE ADULT - ASSESSMENT
89 y/o woman w/ dementia, CAD s/p stents 2003, and HTN presents to the ER for pain in the Lower R mandible. Admitted for teeth extraction w/ OMFS. Found to have acute urinary retention s/p dyer placement 8/31. UA nml, q 8 hr bladder scans, stool count. AAA (4.8cm) noted on renal US, family does not want to obtain CTA a/p at this time. Addition of trazodone for agitation nightly.    RCRI 9/1 for low risk surgery, dental extraction: 1 point for CAD, Class II Risk. 6.0% 30 day risk of death, MI or cardiac arrest.     # New onset A fib w RVR, 9/2  # HFpEF, newly diagnosed, TTE 9/2022  Family reports NO history of afib on am of 9/2 when discussing. Updated them that we will need to work this up further prior to OR.  She does not follow regularly w cardiology. Does have a hx of stents in 2003, not on asa/statin. Takes metop sux 25 mg daily. Currently does not appear to be in a HF exacerbation-->not dyspneic, no hypoxia   - AC (9/2- ), therapeutic lovenox; hold 24 hours prior to dental procedure * R/B discussion w family for long term AC  - Resumed metop sux 25 mg daily PO--> transition to metop tartrate for tighter titration while hospitalized, f/u cards rec's --> incr to metop tartrate 50 mg BID  - TTE --> 9/2 moderate AR, severely dilated RA and LA, preserved EF  - Cont tele  - Goal mag of 2, goal K of 4  - Postponed OR date, discussed w family--> cards consult  - F/u EKG, trop- peaked at 40s, pro BNP- 15k    Phone call to daughter Randi on 9/1, no answer. DIscussed on am of 9/2. Bedside update 9/3.

## 2022-09-03 NOTE — CONSULT NOTE ADULT - ASSESSMENT
89 y/o F w PMH of dementia, CAD s/p 3x stents in 2003 presents to [facility] ED with daughter complaining of pain on the lower right mandible for the last two weeks.  -Pt has multiple non-restorable, grossly carious teeth. Lower right molar tooth #31 currently symptomatic.  -No sign of airway compromise, no intraoral/extraoral swelling or signs of infection spread to any of the fascial spaces    Recs:   -Due to pt's current mental status, extraction of indicated teeth would need to be performed under general anesthesia in the OR  -Given pt's medical comorbidities and social considerations, would recommend extraction of teeth be performed during this hospital stay  -Please admit pt to medical service and OMFS will continue to follow as a consulting service  -Pt will be added on to go to OR either this Thursday or Friday for extraction of any indicated teeth  -Can give Unasyn 3g q6 while admitted  -Soft, non-chew diet   -Please medically optimized pt and document risk stratification prior to OR this week.  -If there are any questions or concerns, please page OMFS on call #[phone]    Oscar Herring DMD  Oral & Maxillofacial Surgery   #[phone]
89F with advanced dementia, CAD with prior stents, HTN, presents with lower right mandible pain requiring teeth extraction with general anesthesia. Cardiology called for cardiac optimization    PLAN:  -Patient seen and examined. BP is sub-optimal and patient is newly diagnosed with atrial fibrillation.  -At this time, would change the toprol xl 37.5 to lopressor 25mg po bid, if tolerated, can further increase 50 BID with hold parameters of SBP less than 100 HR less than 60  -patient is currently on lovenox full dose for AC, for long-term, the CHADSVASC score is elevated to 5, risk and benefits of long-term AC in a 89 year old should be discussed with family.  -prior to the teeth extraction, please HOLD Anticoagulation for 24 hours  -would also keep Mag>2 and K >4   -check a TSH    VOLUME STATUS: euvolemic to dry on exam, ECHO is normal LV function, no active valvular disease    EKG: EKG Reviewed, showing AFIB, no acute coronary syndrome, can consider adding a baby aspirin. No lethal arrythmia on tele.    CARDIAC OPTIMIZATION:  Patient's HR and Blood pressure can be further optimized prior to the OR on Tuesday. Can hold AC for 24 hours prior to OR.  No signs of HF, no active valve disease, no ACS, patient is class II risk or adverse event, 6% 30 day risk of death, MI or cardiac event. This consult to be further reviewed by cardiology attending, Dr. Wilcox.

## 2022-09-03 NOTE — PROGRESS NOTE ADULT - SUBJECTIVE AND OBJECTIVE BOX
HPI: 89 y/o F w PMH of dementia, CAD s/p 3x stents in 2003 initially presented to [facility] ED with daughter complaining of pain on the lower right mandible. HPI collected from daughter. Per daughter, pt reports two week hx of dental pain on the lower right. Pt was seen by outside OMFS earlier this week (Dr Paresh BROWNING), who recommended extraction of teeth #4, 5, 7, 8, 9, 10, 25, 31 under anesthesia. Pt was scheduled for procedure in October and dismissed on PO course of clindamycin. Per daughter, pt has been unable to take PO abx as it is giving her nausea and pt has been vomiting following administration for meds. Pt has also not been eating per daughter. On 09/02/22 patient was planned to be taken to the operating room for extraction but it was cancelled do to new onset atrial fibration. Patient is now planned for extraction in the OR on Tuesday 09/06/2022.     PMH: Dementia, CAD, osteoporosis (never on bisphosphonates)  PSH: S/p stents x3 (2003)  Meds: Metoprolol, Trazadone  All: NKDA    PAST MEDICAL & SURGICAL HISTORY:  Dementia  CAD (coronary artery disease)  Osteoporosis    No significant past surgical history  Physical exam:  General: NAD, NC/AT  Extraoral: No swelling, no asymmetry, neck soft, ANDREY 40mm w/o guarding, TMJ FROM  Intraoral: Grossly carious retained roots at teeth #4, 5, 7, 8, 9, 10, 25, 31 exquisitely tender to palpation, no vestibular swelling, floor of mouth soft/not elevated. Uvula midline  Neuro: AAOx1  Rad: Unable to take dental panoramic radiograph secondary to patients mental status    Vital Signs Last 24 Hrs  T(C): 36.5 (03 Sep 2022 08:43), Max: 36.8 (02 Sep 2022 14:35)  T(F): 97.7 (03 Sep 2022 08:43), Max: 98.2 (02 Sep 2022 14:35)  HR: 106 (03 Sep 2022 08:43) (89 - 160)  BP: 160/96 (03 Sep 2022 08:43) (147/89 - 174/103)  BP(mean): --  RR: 17 (03 Sep 2022 08:43) (16 - 18)  SpO2: 99% (03 Sep 2022 08:43) (97% - 100%)    Parameters below as of 03 Sep 2022 08:43  Patient On (Oxygen Delivery Method): room air        LABS:                                13.7   10.11 )-----------( 128      ( 03 Sep 2022 06:12 )             42.0     CBC Full  -  ( 03 Sep 2022 06:12 )  WBC Count : 10.11 K/uL  RBC Count : 4.62 M/uL  Hemoglobin : 13.7 g/dL  Hematocrit : 42.0 %  Platelet Count - Automated : 128 K/uL  Mean Cell Volume : 90.9 fL  Mean Cell Hemoglobin : 29.7 pg  Mean Cell Hemoglobin Concentration : 32.6 gm/dL                  Patient examined bedside. No acute overnight. Pain continues to be well controlled, tolerating PO soft and bite sized diet,  AFVSS. Patient was more cooperative this am and we were able to do a limited intraoral exam. Patient was scheduled yesterday to go to the OR for removal of carious non-restorable teeth. Case was cancelled as patient was noted to be in a-fib w/ RVR. She was started on rate control medication and lovenox anticoagulation. Overnight required IV metoprolol for rapid a-fib to 160's. Pending Cards work up and optimization for OR. Anticipate OR on Tuesday 09/06/2022 for extraction of carious non-restorable teeth #4, 5, 7, 8, 9, 10, 25, 31.     HPI: 89 y/o F w PMH of dementia, CAD s/p 3x stents in 2003 initially presented to [facility] ED with daughter complaining of pain on the lower right mandible. HPI collected from daughter. Per daughter, pt reports two week hx of dental pain on the lower right. Pt was seen by outside OMFS earlier this week (Dr Paresh BROWNING), who recommended extraction of teeth #4, 5, 7, 8, 9, 10, 25, 31 under general anesthesia. Pt was scheduled for procedure in October and dismissed on PO course of clindamycin. Per daughter, pt has been unable to take PO abx as it is giving her nausea and pt has been vomiting following administration for meds. Pt has also not been eating per daughter. On 09/02/22 patient was planned to be taken to the operating room for extraction but it was cancelled do to new onset atrial fibration. Tentatively planning to add patient on for the OR on 09/06/2022 for removal of carious non-restorable teeth #4, 5, 7, 8, 9, 10, 25, 31 pending Cards work up.     PMH: Dementia, CAD, osteoporosis (never on bisphosphonates)  PSH: S/p stents x3 (2003)  Meds: Metoprolol, Trazadone  All: NKDA    PAST MEDICAL & SURGICAL HISTORY:  Dementia  CAD (coronary artery disease)  Osteoporosis    No significant past surgical history  Physical exam:  General: NAD, NC/AT  Extraoral: No swelling, no asymmetry, neck soft, ANDREY 40mm w/o guarding, TMJ FROM  Intraoral: Grossly carious retained roots at teeth #4, 5, 7, 8, 9, 10, 25, 31 exquisitely tender to palpation, no vestibular swelling, floor of mouth soft/not elevated. Uvula midline  Neuro: AAOx1  Rad: Unable to take dental panoramic radiograph secondary to patients mental status    Vital Signs Last 24 Hrs  T(C): 36.5 (03 Sep 2022 08:43), Max: 36.8 (02 Sep 2022 14:35)  T(F): 97.7 (03 Sep 2022 08:43), Max: 98.2 (02 Sep 2022 14:35)  HR: 106 (03 Sep 2022 08:43) (89 - 160)  BP: 160/96 (03 Sep 2022 08:43) (147/89 - 174/103)  BP(mean): --  RR: 17 (03 Sep 2022 08:43) (16 - 18)  SpO2: 99% (03 Sep 2022 08:43) (97% - 100%)    Parameters below as of 03 Sep 2022 08:43  Patient On (Oxygen Delivery Method): room air      LABS:                                13.7   10.11 )-----------( 128      ( 03 Sep 2022 06:12 )             42.0       CBC Full  -  ( 03 Sep 2022 06:12 )  WBC Count : 10.11 K/uL  RBC Count : 4.62 M/uL  Hemoglobin : 13.7 g/dL  Hematocrit : 42.0 %  Platelet Count - Automated : 128 K/uL  Mean Cell Volume : 90.9 fL  Mean Cell Hemoglobin : 29.7 pg  Mean Cell Hemoglobin Concentration : 32.6 gm/dL                  Interval Events: Patient was scheduled yesterday to go to the OR for removal of carious non-restorable teeth. Case was cancelled as patient was noted to be in a-fib w/ RVR. She was started on rate control medication and lovenox anticoagulation. Overnight required IV metoprolol for rapid a-fib to 160's. Pending Cards work up and optimization for OR. Anticipate OR on Tuesday 09/06/2022 for extraction of carious non-restorable teeth #4, 5, 7, 8, 9, 10, 25, 31. Patient examined bedside. Pain continues to be well controlled, tolerating PO soft and bite sized diet,  AFVSS. Patient was more cooperative this am and allowed for examination for oral cavity, no signs of acute infection.    HPI: 89 y/o F w PMH of dementia, CAD s/p 3x stents in 2003 initially presented to [facility] ED with daughter complaining of pain on the lower right mandible. HPI collected from daughter. Per daughter, pt reports two week hx of dental pain on the lower right. Pt was seen by outside OMFS earlier this week (Dr Paresh BROWNING), who recommended extraction of teeth #4, 5, 7, 8, 9, 10, 25, 31 under general anesthesia. Pt was scheduled for procedure in October and dismissed on PO course of clindamycin. Per daughter, pt has been unable to take PO abx as it is giving her nausea and pt has been vomiting following administration for meds. Pt has also not been eating per daughter. On 09/02/22 patient was planned to be taken to the operating room for extraction but it was cancelled do to new onset atrial fibration.     PMH: Dementia, CAD, osteoporosis (never on bisphosphonates)  PSH: S/p stents x3 (2003)  Meds: Metoprolol, Trazadone  All: NKDA    PAST MEDICAL & SURGICAL HISTORY:  Dementia  CAD (coronary artery disease)  Osteoporosis    No significant past surgical history  Physical exam:  General: NAD, NC/AT  Extraoral: No swelling, no asymmetry, neck soft, ANDREY 40mm w/o guarding, TMJ FROM  Intraoral: Grossly carious retained roots at teeth #4, 5, 7, 8, 9, 10, 25, 31 exquisitely tender to palpation, no vestibular swelling, floor of mouth soft/not elevated. Uvula midline. No signs of acute infection.   Neuro: AAOx1  Rad: Unable to take dental panoramic radiograph secondary to patients mental status    Vital Signs Last 24 Hrs  T(C): 36.5 (03 Sep 2022 08:43), Max: 36.8 (02 Sep 2022 14:35)  T(F): 97.7 (03 Sep 2022 08:43), Max: 98.2 (02 Sep 2022 14:35)  HR: 106 (03 Sep 2022 08:43) (89 - 160)  BP: 160/96 (03 Sep 2022 08:43) (147/89 - 174/103)  BP(mean): --  RR: 17 (03 Sep 2022 08:43) (16 - 18)  SpO2: 99% (03 Sep 2022 08:43) (97% - 100%)    Parameters below as of 03 Sep 2022 08:43  Patient On (Oxygen Delivery Method): room air      LABS:                                13.7   10.11 )-----------( 128      ( 03 Sep 2022 06:12 )             42.0       CBC Full  -  ( 03 Sep 2022 06:12 )  WBC Count : 10.11 K/uL  RBC Count : 4.62 M/uL  Hemoglobin : 13.7 g/dL  Hematocrit : 42.0 %  Platelet Count - Automated : 128 K/uL  Mean Cell Volume : 90.9 fL  Mean Cell Hemoglobin : 29.7 pg  Mean Cell Hemoglobin Concentration : 32.6 gm/dL

## 2022-09-03 NOTE — GOALS OF CARE CONVERSATION - ADVANCED CARE PLANNING - CONVERSATION DETAILS
Daughter Rachael brought in prior MOLST form from home. Reviewed MOLST with daughter - confirmed patient is DNR,DNI, Comfort measures only, Send to the hospital if necessary, No feeding tube, trial period of IVF, Abx if needed.  Discussed with Dr. Pete.

## 2022-09-03 NOTE — CONSULT NOTE ADULT - SUBJECTIVE AND OBJECTIVE BOX
Date of Admission:  8/30/22  CHIEF COMPLAINT:    HISTORY OF PRESENT ILLNESS:  89 y/o woman w/ dementia, CAD s/p stents 2003, and HTN presents to the ER for pain in the Lower R mandible. Collateral obtained from daughter and granddaughter at bedside as pt is a poor historian. She has been complaining of R lower tooth pain since last Wednesday and has not been eating much due to pain. She was seen by [facility] outpatient who recommended extraction of several teeth under anesthesia as pt is fully dep on ADLs and very anxious/confused, however procedure was scheduled to be done in October. Pt meanwhile already does not eat very much and because of the tooth discomfort has not been taking pills either. Pt denies fever, chest pain or shortness of breath. Per daughter no diarrhea nor dysuria recently. Pt on clindamycin outpatient.     Allergies    No Known Allergies    Intolerances    	    MEDICATIONS:  enoxaparin Injectable 45 milliGRAM(s) SubCutaneous every 12 hours    ampicillin/sulbactam  IVPB 3 Gram(s) IV Intermittent every 8 hours      acetaminophen     Tablet .. 650 milliGRAM(s) Oral every 6 hours PRN  haloperidol    Injectable 1 milliGRAM(s) IV Push every 6 hours PRN  oxyCODONE    IR 2.5 milliGRAM(s) Oral every 6 hours PRN  traZODone 100 milliGRAM(s) Oral at bedtime    bisacodyl 5 milliGRAM(s) Oral at bedtime  polyethylene glycol 3350 17 Gram(s) Oral daily      magnesium sulfate  IVPB 1 Gram(s) IV Intermittent once      PAST MEDICAL & SURGICAL HISTORY:  Dementia      CAD (coronary artery disease)      Osteoporosis      No significant past surgical history          FAMILY HISTORY:  No pertinent family history in first degree relatives        SOCIAL HISTORY:    [ ] Non-smoker  [ ] Smoker  [ ] Alcohol      REVIEW OF SYSTEMS:  See HPI. Otherwise, 10 point ROS done and otherwise negative.      T(C): 36.6 (09-03-22 @ 13:22), Max: 36.8 (09-02-22 @ 14:35)  HR: 92 (09-03-22 @ 13:22) (89 - 160)  BP: 160/98 (09-03-22 @ 13:22) (148/85 - 174/103)  RR: 18 (09-03-22 @ 13:22) (17 - 18)  SpO2: 99% (09-03-22 @ 08:43) (98% - 100%)  Wt(kg): --  I&O's Summary      Physical Exam:  General: NAD  Cardiovascular: Normal S1 S2, No JVD, No murmurs, No edema  Respiratory: Lungs clear to auscultation	  Gastrointestinal:  Soft, Non-tender, + BS	  Skin: warm and dry, No rashes, No ecchymoses, No cyanosis	  Extremities:  No clubbing, cyanosis or edema  Vascular: Peripheral pulses palpable 2+ bilaterally    LABS:	   	    CBC Full  -  ( 03 Sep 2022 06:12 )  WBC Count : 10.11 K/uL  Hemoglobin : 13.7 g/dL  Hematocrit : 42.0 %  Platelet Count - Automated : 128 K/uL  Mean Cell Volume : 90.9 fL  Mean Cell Hemoglobin : 29.7 pg  Mean Cell Hemoglobin Concentration : 32.6 gm/dL  Auto Neutrophil # : x  Auto Lymphocyte # : x  Auto Monocyte # : x  Auto Eosinophil # : x  Auto Basophil # : x  Auto Neutrophil % : x  Auto Lymphocyte % : x  Auto Monocyte % : x  Auto Eosinophil % : x  Auto Basophil % : x    09-03    143  |  103  |  20  ----------------------------<  78  3.5   |  22  |  0.90  09-02    145  |  108<H>  |  14  ----------------------------<  71  3.6   |  26  |  0.88    Ca    9.0      03 Sep 2022 06:12  Ca    9.0      02 Sep 2022 06:40  Phos  2.8     09-03  Phos  2.7     09-02  Mg     1.90     09-03  Mg     1.90     09-02        proBNP: Serum Pro-Brain Natriuretic Peptide: 05269 pg/mL (09-03 @ 06:12)    Lipid Profile:   HgA1c:   TSH:       CARDIAC MARKERS:            TELEMETRY: 	    ECG:  	  RADIOLOGY:  OTHER: 	    PREVIOUS DIAGNOSTIC TESTING:    [ ] Echocardiogram:  [ ]  Catheterization:  [ ] Stress Test:  	  	  ASSESSMENT/PLAN: 	    Lissette [place] [id number] Date of Admission:  8/30/22  CHIEF COMPLAINT:    HISTORY OF PRESENT ILLNESS:  89 y/o woman w/ dementia, CAD s/p stents 2003, and HTN presents to the ER for pain in the Lower R mandible. Collateral obtained from daughter and granddaughter at bedside as pt is a poor historian. She has been complaining of R lower tooth pain since last Wednesday and has not been eating much due to pain. She was seen by [facility] outpatient who recommended extraction of several teeth under anesthesia as pt is fully dep on ADLs and very anxious/confused. The procedure was scheduled to be done in October, however patient has become more symptomatic and is now scheduled to have teeth extracted in the OR on this admission with anesthesia.  Cardiology consulted for cardiac optimization.    Allergies    No Known Allergies    Intolerances    	    MEDICATIONS:  enoxaparin Injectable 45 milliGRAM(s) SubCutaneous every 12 hours    ampicillin/sulbactam  IVPB 3 Gram(s) IV Intermittent every 8 hours      acetaminophen     Tablet .. 650 milliGRAM(s) Oral every 6 hours PRN  haloperidol    Injectable 1 milliGRAM(s) IV Push every 6 hours PRN  oxyCODONE    IR 2.5 milliGRAM(s) Oral every 6 hours PRN  traZODone 100 milliGRAM(s) Oral at bedtime    bisacodyl 5 milliGRAM(s) Oral at bedtime  polyethylene glycol 3350 17 Gram(s) Oral daily  magnesium sulfate  IVPB 1 Gram(s) IV Intermittent once    PAST MEDICAL & SURGICAL HISTORY:  Dementia  CAD (coronary artery disease)  Osteoporosis    No significant past surgical history      FAMILY HISTORY:  No pertinent family history in first degree relatives        REVIEW OF SYSTEMS:  See HPI. Otherwise, 10 point ROS done and otherwise negative.      T(C): 36.6 (09-03-22 @ 13:22), Max: 36.8 (09-02-22 @ 14:35)  HR: 92 (09-03-22 @ 13:22) (89 - 160)  BP: 160/98 (09-03-22 @ 13:22) (148/85 - 174/103)  RR: 18 (09-03-22 @ 13:22) (17 - 18)  SpO2: 99% (09-03-22 @ 08:43) (98% - 100%)  Wt(kg): --  I&O's Summary      Physical Exam:  General: cachectic, somnolent  Cardiovascular: irregular rate and rhythm, no JVD  Respiratory: Lungs clear to auscultation	  Gastrointestinal:  Soft, Non-tender, + BS	  Skin: warm and dry, No rashes, No ecchymoses, No cyanosis	  Extremities:  No clubbing, cyanosis or edema  Vascular: Peripheral pulses palpable 2+ bilaterally    LABS:	   	    CBC Full  -  ( 03 Sep 2022 06:12 )  WBC Count : 10.11 K/uL  Hemoglobin : 13.7 g/dL  Hematocrit : 42.0 %  Platelet Count - Automated : 128 K/uL  Mean Cell Volume : 90.9 fL  Mean Cell Hemoglobin : 29.7 pg  Mean Cell Hemoglobin Concentration : 32.6 gm/dL  Auto Neutrophil # : x  Auto Lymphocyte # : x  Auto Monocyte # : x  Auto Eosinophil # : x  Auto Basophil # : x  Auto Neutrophil % : x  Auto Lymphocyte % : x  Auto Monocyte % : x  Auto Eosinophil % : x  Auto Basophil % : x    09-03    143  |  103  |  20  ----------------------------<  78  3.5   |  22  |  0.90  09-02    145  |  108<H>  |  14  ----------------------------<  71  3.6   |  26  |  0.88    Ca    9.0      03 Sep 2022 06:12  Ca    9.0      02 Sep 2022 06:40  Phos  2.8     09-03  Phos  2.7     09-02  Mg     1.90     09-03  Mg     1.90     09-02        proBNP: Serum Pro-Brain Natriuretic Peptide: 39933 pg/mL (09-03 @ 06:12)      < from: Transthoracic Echocardiogram (09.02.22 @ 13:57) >    Patient name: JAMAR HOLLIDAY  YOB: 1924   Age: 89 (F)   MR#: [id number]  Study Date: 9/2/2022  Location: [id number]: Adamaris Cullen RDCS  Study quality: Technically good  Referring Physician: Sasha Pete MD  Blood Pressure: 147/89 mmHg  Height: 157 cm  Weight: 46 kg  BSA: 1.4 m2  ------------------------------------------------------------------------  PROCEDURE: Transthoracic echocardiogram with 2-D, M-Mode  and complete spectral and color flow Doppler.  INDICATION: Unspecified atrial fibrillation (I48.91)  ------------------------------------------------------------------------  DIMENSIONS:  Dimensions:     Normal Values:  LA:     4.0 cm    2.0 - 4.0 cm  Ao:     3.3 cm    2.0 - 3.8 cm  SEPTUM: 0.7 cm    0.6 - 1.2 cm  PWT:    0.7 cm    0.6 - 1.1 cm  LVIDd:  3.8 cm    3.0 - 5.6 cm  LVIDs:  2.6 cm    1.8 - 4.0 cm  Derived Variables:  LVMI: 50 g/m2  RWT: 0.36  Fractional short: 32 %  Ejection Fraction (Schuler Rule): 60 %  ------------------------------------------------------------------------  OBSERVATIONS:  Mitral Valve: Mitral annular calcification, otherwise  normal mitral valve. Mild mitral regurgitation.  Aortic Root: Normal size aortic root. (Ao:3.3 cm).  Aortic Valve: Calcified trileaflet aortic valve with normal  opening. Mild-moderate aortic regurgitation.  Left Atrium: Severely dilated left atrium.  LA volume index  = 56 cc/m2.  Left Ventricle: Normal left ventricular systolic function.  No segmental wall motion abnormalities. Normal left  ventricular internal dimensions and wall thicknesses.  Right Heart: Severe right atrial enlargement. Normal right  ventricular size and function. Normal tricuspid valve.  Mild tricuspid regurgitation. Normal pulmonic valve.  Mild  pulmonic regurgitation.  Pericardium/PleuraNormal pericardium with no pericardial  effusion.  ------------------------------------------------------------------------  CONCLUSIONS:  1. Mitral annular calcification, otherwise normal mitral  valve. Mild mitral regurgitation.  2. Calcified trileaflet aortic valve with normal opening.  Mild-moderate aortic regurgitation.  3. Severely dilated left atrium.  LA volume index = 56  cc/m2.  4. Normal left ventricular internal dimensions and wall  thicknesses.  5. Normal left ventricular systolic function. No segmental  wall motion abnormalities.  6. Severe right atrial enlargement.  7. Normal right ventricular size and function.  ------------------------------------------------------------------------  Confirmed on  9/2/2022 - 16:01:42 by Jeff Sosa M.D.,  [facility], [facility]  ------------------------------------------------------------------------    < end of copied text >    TELEMETRY:  Atrial fibrillation

## 2022-09-03 NOTE — PROGRESS NOTE ADULT - SUBJECTIVE AND OBJECTIVE BOX
[facility] Division of Hospital Medicine  Sasha Pete MD  Pager (M-F, 8A-5P): [phone]  Other Times:  [phone]      SUBJECTIVE / OVERNIGHT EVENTS: CLARKE, family at bedside saying that she is more anxious and in pain. Tele reviewed a fib w RVR intermittently over night, s/p metop 2.5 mg IV once.     MEDICATIONS  (STANDING):  ampicillin/sulbactam  IVPB 3 Gram(s) IV Intermittent every 8 hours  bisacodyl 5 milliGRAM(s) Oral at bedtime  enoxaparin Injectable 45 milliGRAM(s) SubCutaneous every 12 hours  polyethylene glycol 3350 17 Gram(s) Oral daily  traZODone 100 milliGRAM(s) Oral at bedtime    MEDICATIONS  (PRN):  acetaminophen     Tablet .. 650 milliGRAM(s) Oral every 6 hours PRN Temp greater or equal to 38C (100.4F), Mild Pain (1 - 3)  haloperidol    Injectable 1 milliGRAM(s) IV Push every 6 hours PRN Agitation  oxyCODONE    IR 2.5 milliGRAM(s) Oral every 6 hours PRN Severe Pain (7 - 10)      I&O's Summary      PHYSICAL EXAM:  Vital Signs Last 24 Hrs  T(C): 36.7 (03 Sep 2022 15:50), Max: 36.7 (02 Sep 2022 17:55)  T(F): 98 (03 Sep 2022 15:50), Max: 98 (02 Sep 2022 17:55)  HR: 43 (03 Sep 2022 15:50) (43 - 160)  BP: 141/74 (03 Sep 2022 15:50) (141/74 - 174/103)  BP(mean): --  RR: 16 (03 Sep 2022 15:50) (16 - 18)  SpO2: 100% (03 Sep 2022 15:50) (99% - 100%)    Parameters below as of 03 Sep 2022 15:50  Patient On (Oxygen Delivery Method): room air      CONSTITUTIONAL: NAD, cachectic  EYES: PERRLA; conjunctiva and sclera clear  ENMT: Moist oral mucosa, no pharyngeal injection or exudates; poor dentition  RESPIRATORY: Normal respiratory effort; lungs are clear to auscultation bilaterally  CARDIOVASCULAR: Regular rate and rhythm, normal S1 and S2, no murmur/rub/gallop  ABDOMEN: abd TTP in the suprapubic range, improved from day prior  PSYCH: A+O to person only  SKIN: No rashes; no palpable lesions    LABS:                        13.7   10.11 )-----------( 128      ( 03 Sep 2022 06:12 )             42.0     09-03    143  |  103  |  20  ----------------------------<  78  3.5   |  22  |  0.90    Ca    9.0      03 Sep 2022 06:12  Phos  2.8     09-03  Mg     1.90     09-03      PT/INR - ( 02 Sep 2022 12:45 )   PT: 12.0 sec;   INR: 1.03 ratio         PTT - ( 02 Sep 2022 21:02 )  PTT:63.1 sec  CARDIAC MARKERS ( 02 Sep 2022 06:40 )  x     / x     / 130 U/L / x     / 2.7 ng/mL            RADIOLOGY & ADDITIONAL TESTS:  Results Reviewed:   Imaging Personally Reviewed:  Electrocardiogram Personally Reviewed:    COORDINATION OF CARE:  Care Discussed with Consultants/Other Providers [Y/N]: Cards, OMFS  Prior or Outpatient Records Reviewed [Y/N]:

## 2022-09-04 LAB
ANION GAP SERPL CALC-SCNC: 18 MMOL/L — HIGH (ref 7–14)
BUN SERPL-MCNC: 22 MG/DL — SIGNIFICANT CHANGE UP (ref 7–23)
CALCIUM SERPL-MCNC: 9 MG/DL — SIGNIFICANT CHANGE UP (ref 8.4–10.5)
CHLORIDE SERPL-SCNC: 104 MMOL/L — SIGNIFICANT CHANGE UP (ref 98–107)
CO2 SERPL-SCNC: 20 MMOL/L — LOW (ref 22–31)
CREAT SERPL-MCNC: 0.83 MG/DL — SIGNIFICANT CHANGE UP (ref 0.5–1.3)
EGFR: 64 ML/MIN/1.73M2 — SIGNIFICANT CHANGE UP
GLUCOSE SERPL-MCNC: 71 MG/DL — SIGNIFICANT CHANGE UP (ref 70–99)
HCT VFR BLD CALC: 41 % — SIGNIFICANT CHANGE UP (ref 34.5–45)
HGB BLD-MCNC: 14 G/DL — SIGNIFICANT CHANGE UP (ref 11.5–15.5)
MAGNESIUM SERPL-MCNC: 2.2 MG/DL — SIGNIFICANT CHANGE UP (ref 1.6–2.6)
MCHC RBC-ENTMCNC: 31.4 PG — SIGNIFICANT CHANGE UP (ref 27–34)
MCHC RBC-ENTMCNC: 34.1 GM/DL — SIGNIFICANT CHANGE UP (ref 32–36)
MCV RBC AUTO: 91.9 FL — SIGNIFICANT CHANGE UP (ref 80–100)
NRBC # BLD: 0 /100 WBCS — SIGNIFICANT CHANGE UP (ref 0–0)
NRBC # FLD: 0 K/UL — SIGNIFICANT CHANGE UP (ref 0–0)
PHOSPHATE SERPL-MCNC: 2.6 MG/DL — SIGNIFICANT CHANGE UP (ref 2.5–4.5)
PLATELET # BLD AUTO: 122 K/UL — LOW (ref 150–400)
POTASSIUM SERPL-MCNC: 3.9 MMOL/L — SIGNIFICANT CHANGE UP (ref 3.5–5.3)
POTASSIUM SERPL-SCNC: 3.9 MMOL/L — SIGNIFICANT CHANGE UP (ref 3.5–5.3)
RBC # BLD: 4.46 M/UL — SIGNIFICANT CHANGE UP (ref 3.8–5.2)
RBC # FLD: 14.2 % — SIGNIFICANT CHANGE UP (ref 10.3–14.5)
SODIUM SERPL-SCNC: 142 MMOL/L — SIGNIFICANT CHANGE UP (ref 135–145)
TSH SERPL-MCNC: 0.83 UIU/ML — SIGNIFICANT CHANGE UP (ref 0.27–4.2)
WBC # BLD: 9.66 K/UL — SIGNIFICANT CHANGE UP (ref 3.8–10.5)
WBC # FLD AUTO: 9.66 K/UL — SIGNIFICANT CHANGE UP (ref 3.8–10.5)

## 2022-09-04 PROCEDURE — 99232 SBSQ HOSP IP/OBS MODERATE 35: CPT

## 2022-09-04 PROCEDURE — 99222 1ST HOSP IP/OBS MODERATE 55: CPT | Mod: FS

## 2022-09-04 RX ORDER — ENOXAPARIN SODIUM 100 MG/ML
45 INJECTION SUBCUTANEOUS ONCE
Refills: 0 | Status: DISCONTINUED | OUTPATIENT
Start: 2022-09-04 | End: 2022-09-04

## 2022-09-04 RX ORDER — METOPROLOL TARTRATE 50 MG
2.5 TABLET ORAL ONCE
Refills: 0 | Status: COMPLETED | OUTPATIENT
Start: 2022-09-04 | End: 2022-09-04

## 2022-09-04 RX ORDER — ACETAMINOPHEN 500 MG
700 TABLET ORAL ONCE
Refills: 0 | Status: COMPLETED | OUTPATIENT
Start: 2022-09-04 | End: 2022-09-04

## 2022-09-04 RX ORDER — ENOXAPARIN SODIUM 100 MG/ML
45 INJECTION SUBCUTANEOUS
Refills: 0 | Status: DISCONTINUED | OUTPATIENT
Start: 2022-09-04 | End: 2022-09-04

## 2022-09-04 RX ORDER — METOPROLOL TARTRATE 50 MG
25 TABLET ORAL EVERY 8 HOURS
Refills: 0 | Status: DISCONTINUED | OUTPATIENT
Start: 2022-09-04 | End: 2022-09-11

## 2022-09-04 RX ADMIN — AMPICILLIN SODIUM AND SULBACTAM SODIUM 200 GRAM(S): 250; 125 INJECTION, POWDER, FOR SUSPENSION INTRAMUSCULAR; INTRAVENOUS at 21:49

## 2022-09-04 RX ADMIN — Medication 280 MILLIGRAM(S): at 16:53

## 2022-09-04 RX ADMIN — Medication 2.5 MILLIGRAM(S): at 22:42

## 2022-09-04 RX ADMIN — POLYETHYLENE GLYCOL 3350 17 GRAM(S): 17 POWDER, FOR SOLUTION ORAL at 12:14

## 2022-09-04 RX ADMIN — AMPICILLIN SODIUM AND SULBACTAM SODIUM 200 GRAM(S): 250; 125 INJECTION, POWDER, FOR SUSPENSION INTRAMUSCULAR; INTRAVENOUS at 14:02

## 2022-09-04 RX ADMIN — AMPICILLIN SODIUM AND SULBACTAM SODIUM 200 GRAM(S): 250; 125 INJECTION, POWDER, FOR SUSPENSION INTRAMUSCULAR; INTRAVENOUS at 06:28

## 2022-09-04 RX ADMIN — ENOXAPARIN SODIUM 45 MILLIGRAM(S): 100 INJECTION SUBCUTANEOUS at 05:46

## 2022-09-04 RX ADMIN — Medication 25 MILLIGRAM(S): at 05:50

## 2022-09-04 NOTE — PROGRESS NOTE ADULT - ASSESSMENT
89 y/o F w PMH of dementia, CAD s/p 3x stents in 2003 presented to [facility] ED with daughter complaining of pain on the lower right mandible for the last two weeks. Pt has multiple non-restorable, grossly carious teeth- #4, 5, 7, 8, 9, 10, 25, 31. Lower right molar tooth #31 currently symptomatic. No sign of airway compromise, no intraoral / extraoral swelling or signs of infection spread to any of the fascial spaces. Patient was scheduled yesterday to go to the OR for removal of carious non-restorable teeth on 9/2. Case was cancelled as patient was noted to be in a-fib w/ RVR. She was started on rate control medication and lovenox anticoagulation. Overnight 9/2 required IV metoprolol for rapid a-fib to 160's.     - Patient will require optimization prior to planned procedure in OR. Anticipate OR Tuesday 9/6 for extraction of non-restorable teeth #4, 5, 7, 8, 9, 10, 25, 31.  - as per cardiology: hold anticoags 24hr prior to OR, magnesium goal >2, potassium goal >4  - Given patients medical comorbidities and social considerations, would recommend extraction of teeth be performed during this hospital stay.  - Continue Unasyn 3g q6h while admitted.   - Soft non-chew diet     Osbaldo Hinton  Oral and Maxillofacial Surgery   Pager #[phone]  Available on [employer] Teams          89 y/o F w PMH of dementia, CAD s/p 3x stents in 2003 presented to [facility] ED with daughter complaining of pain on the lower right mandible for the last two weeks. Pt has multiple non-restorable, grossly carious teeth- #4, 5, 7, 8, 9, 10, 25, 31. Lower right molar tooth #31 currently symptomatic. No sign of airway compromise, no intraoral / extraoral swelling or signs of infection spread to any of the fascial spaces. Patient was scheduled yesterday to go to the OR for removal of carious non-restorable teeth on 9/2. Case was cancelled as patient was noted to be in a-fib w/ RVR. She was started on rate control medication and lovenox anticoagulation. Overnight 9/2 required IV metoprolol for rapid a-fib to 160's.     - Patient will require optimization prior to planned procedure in OR. Anticipate OR Tuesday 9/6 for extraction of non-restorable teeth #4, 5, 7, 8, 9, 10, 25, 31.  - Given patients medical comorbidities and social considerations, would recommend extraction of teeth be performed during this hospital stay.  - Continue Unasyn 3g q6h while admitted.   - Soft non-chew diet     Osbaldo Hinton  Oral and Maxillofacial Surgery   Pager #[phone]  Available on [employer] Teams

## 2022-09-04 NOTE — PROGRESS NOTE ADULT - SUBJECTIVE AND OBJECTIVE BOX
9/3 HD5  Interval Events: Patient was scheduled Friday to go to the OR for removal of carious non-restorable teeth. Case was cancelled as patient was noted to be in a-fib w/ RVR. She was started on rate control medication and lovenox anticoagulation. Overnight required IV metoprolol for rapid a-fib to 160's. Pending Cards work up and optimization for OR. Anticipate OR on Tuesday 09/06/2022 for extraction of carious non-restorable teeth #4, 5, 7, 8, 9, 10, 25, 31. Patient examined bedside. Pain continues to be well controlled, tolerating PO soft and bite sized diet,  AFVSS. Patient was more cooperative this am and allowed for examination for oral cavity, no signs of acute infection.    9/4 HD6:  patient visited beside. CLARKE. LAKSHMI. patient has been seen by cardiology with recs for hold anticoag 24hrs prior to OR, magnesium >2 and potassium >4. Patient is currently undergoing optimization by medicine for OR planned now on Tuesday 9/6.     HPI: 89 y/o F w PMH of dementia, CAD s/p 3x stents in 2003 initially presented to [facility] ED with daughter complaining of pain on the lower right mandible. HPI collected from daughter. Per daughter, pt reports two week hx of dental pain on the lower right. Pt was seen by outside OMFS earlier this week (Dr Paresh BROWNING), who recommended extraction of teeth #4, 5, 7, 8, 9, 10, 25, 31 under general anesthesia. Pt was scheduled for procedure in October and dismissed on PO course of clindamycin. Per daughter, pt has been unable to take PO abx as it is giving her nausea and pt has been vomiting following administration for meds. Pt has also not been eating per daughter. On 09/02/22 patient was planned to be taken to the operating room for extraction but it was cancelled do to new onset atrial fibration.     PMH: Dementia, CAD, osteoporosis (never on bisphosphonates)  PSH: S/p stents x3 (2003)  Meds: Metoprolol, Trazadone  All: NKDA    PAST MEDICAL & SURGICAL HISTORY:  Dementia  CAD (coronary artery disease)  Osteoporosis    No significant past surgical history  Physical exam:  General: NAD, NC/AT  Extraoral: No swelling, no asymmetry, neck soft, ANDREY 40mm w/o guarding, TMJ FROM  Intraoral: Grossly carious retained roots at teeth #4, 5, 7, 8, 9, 10, 25, 31 exquisitely tender to palpation, no vestibular swelling, floor of mouth soft/not elevated. Uvula midline. No signs of acute infection.   Neuro: AAOx1  Rad: Unable to take dental panoramic radiograph secondary to patients mental status    Vital Signs Last 24 Hrs  T(C): 36.7 (04 Sep 2022 06:42), Max: 36.7 (03 Sep 2022 15:50)  T(F): 98 (04 Sep 2022 06:42), Max: 98 (03 Sep 2022 15:50)  HR: 90 (04 Sep 2022 06:42) (43 - 106)  BP: 141/82 (04 Sep 2022 06:42) (141/74 - 167/76)  BP(mean): --  RR: 19 (04 Sep 2022 06:42) (16 - 19)  SpO2: 100% (04 Sep 2022 06:42) (99% - 100%)    Parameters below as of 04 Sep 2022 06:42  Patient On (Oxygen Delivery Method): room air      LABS:                                    14.0   9.66  )-----------( 122      ( 04 Sep 2022 05:51 )             41.0     09-04    142  |  104  |  22  ----------------------------<  71  3.9   |  20<L>  |  0.83    Ca    9.0      04 Sep 2022 05:51  Phos  2.6     09-04  Mg     2.20     09-04

## 2022-09-04 NOTE — CHART NOTE - NSCHARTNOTEFT_GEN_A_CORE
Per pharmacy, due to pt's CrCl less than 30, Lovenox should be daily dosing, therefore no further doses to be given tonight. Will hold till after teeth extractions.

## 2022-09-04 NOTE — PROGRESS NOTE ADULT - ASSESSMENT
89 y/o woman w/ dementia, CAD s/p stents 2003, and HTN presents to the ER for pain in the Lower R mandible. Admitted for teeth extraction w/ OMFS. Found to have acute urinary retention s/p dyer placement 8/31. UA nml, q 8 hr bladder scans, stool count. AAA (4.8cm) noted on renal US, family does not want to obtain CTA a/p at this time. Addition of trazodone for agitation nightly.    RCRI 9/1 for low risk surgery, dental extraction: 1 point for CAD, Class II Risk. 6.0% 30 day risk of death, MI or cardiac arrest.     # New onset A fib w RVR, 9/2  # HFpEF, newly diagnosed, TTE 9/2022  Family reports NO history of afib on am of 9/2 when discussing. Updated them that we will need to work this up further prior to OR.  She does not follow regularly w cardiology. Does have a hx of stents in 2003, not on asa/statin. Takes metop sux 25 mg daily. Currently does not appear to be in a HF exacerbation-->not dyspneic, no hypoxia   - AC (9/2- ), therapeutic lovenox; hold 24 hours prior to dental procedure, HOLD on 9/5 * R/B discussion w family for long term AC  - Resumed metop sux 25 mg daily PO--> transition to metop tartrate for tighter titration while hospitalized, f/u cards rec's --> incr to metop tartrate 50 mg BID  - TTE --> 9/2 moderate AR, severely dilated RA and LA, preserved EF  - Cont tele  - Goal mag of 2, goal K of 4  - Postponed OR date, discussed w family--> cards consult  - F/u EKG, trop- peaked at 40s, pro BNP- 15k    Phone call to daughter Randi on 9/1, no answer. DIscussed on am of 9/2. Bedside update 9/3.

## 2022-09-04 NOTE — CHART NOTE - NSCHARTNOTEFT_GEN_A_CORE
Extensive discussion held with patient's daughter Rachael ([phone]) over the phone.   Rachael had some questions about which teeth were being removed and voiced her concerns that she only wanted to have *one* tooth removed (Tooth #31).   Explanation offered for rationale to remove other obvious non-salvageable teeth and discussed risk for future pain, infection, and need for repeat exposure to general anesthetic. Explained that leaving non-restorable teeth could contribute to symptoms in the near future that can in turn cause difficulty in PO intake and adequate nutrition. Daughter understood rationale and states she is okay with proceeding with initial plan to remove all non-restorable grossly decayed/fractured teeth.     Janet Landry  Oral Maxillofacial Surgery  [facility]: [id number]  [facility]: [phone]  Available on Microsoft Teams

## 2022-09-05 ENCOUNTER — TRANSCRIPTION ENCOUNTER (OUTPATIENT)
Age: 87
End: 2022-09-05

## 2022-09-05 LAB
SARS-COV-2 RNA SPEC QL NAA+PROBE: SIGNIFICANT CHANGE UP

## 2022-09-05 PROCEDURE — 99232 SBSQ HOSP IP/OBS MODERATE 35: CPT

## 2022-09-05 RX ORDER — METOPROLOL TARTRATE 50 MG
2.5 TABLET ORAL ONCE
Refills: 0 | Status: COMPLETED | OUTPATIENT
Start: 2022-09-05 | End: 2022-09-05

## 2022-09-05 RX ORDER — LISINOPRIL 2.5 MG/1
2.5 TABLET ORAL DAILY
Refills: 0 | Status: DISCONTINUED | OUTPATIENT
Start: 2022-09-05 | End: 2022-09-06

## 2022-09-05 RX ADMIN — POLYETHYLENE GLYCOL 3350 17 GRAM(S): 17 POWDER, FOR SOLUTION ORAL at 11:50

## 2022-09-05 RX ADMIN — OXYCODONE HYDROCHLORIDE 2.5 MILLIGRAM(S): 5 TABLET ORAL at 12:48

## 2022-09-05 RX ADMIN — Medication 2.5 MILLIGRAM(S): at 06:25

## 2022-09-05 RX ADMIN — AMPICILLIN SODIUM AND SULBACTAM SODIUM 200 GRAM(S): 250; 125 INJECTION, POWDER, FOR SUSPENSION INTRAMUSCULAR; INTRAVENOUS at 05:59

## 2022-09-05 RX ADMIN — Medication 25 MILLIGRAM(S): at 12:54

## 2022-09-05 RX ADMIN — OXYCODONE HYDROCHLORIDE 2.5 MILLIGRAM(S): 5 TABLET ORAL at 11:48

## 2022-09-05 RX ADMIN — Medication 650 MILLIGRAM(S): at 17:33

## 2022-09-05 RX ADMIN — Medication 650 MILLIGRAM(S): at 16:38

## 2022-09-05 RX ADMIN — AMPICILLIN SODIUM AND SULBACTAM SODIUM 200 GRAM(S): 250; 125 INJECTION, POWDER, FOR SUSPENSION INTRAMUSCULAR; INTRAVENOUS at 12:52

## 2022-09-05 RX ADMIN — AMPICILLIN SODIUM AND SULBACTAM SODIUM 200 GRAM(S): 250; 125 INJECTION, POWDER, FOR SUSPENSION INTRAMUSCULAR; INTRAVENOUS at 22:22

## 2022-09-05 NOTE — PROGRESS NOTE ADULT - ASSESSMENT
89 y/o F w PMH of dementia, CAD s/p 3x stents in 2003 presented to [facility] ED with daughter complaining of pain on the lower right mandible for the last two weeks. Pt has multiple non-restorable, grossly carious teeth- #4, 5, 7, 8, 9, 10, 25, 31. Lower right molar tooth #31 currently symptomatic. No sign of airway compromise, no intraoral / extraoral swelling or signs of infection spread to any of the fascial spaces. Patient was scheduled yesterday to go to the OR for removal of carious non-restorable teeth on 9/2. Case was cancelled as patient was noted to be in a-fib w/ RVR. She was started on rate control medication and lovenox anticoagulation. Overnight 9/2 required IV metoprolol for rapid a-fib to 160's. A-fib is now controlled and patient is planned for the OR on 09/06 for extraction of carious non-restorable tooth #4, 5, 7, 8, 9, 10, 25, 31.    Recommendations   - Given patients medical comorbidities and social considerations, would recommend extraction of teeth be performed during this hospital stay.  - Patient will require optimization prior to planned procedure in OR. Anticipate OR Tuesday 9/6 for extraction of carious non-restorable teeth #4, 5, 7, 8, 9, 10, 25, 31.  - Continue Unasyn 3g q6h while admitted.   - Soft non-chew diet     Usama Simpson  Oral and Maxillofacial Surgery   Pager #[phone]  Available on Microsoft Teams          89 y/o F w PMH of dementia, CAD s/p 3x stents in 2003 presented to [facility] ED with daughter complaining of pain on the lower right mandible for the last two weeks. Pt has multiple non-restorable, grossly carious teeth- #4, 5, 7, 8, 9, 10, 25, 31. Lower right molar tooth #31 currently symptomatic. No sign of airway compromise, no intraoral / extraoral swelling or signs of infection spread to any of the fascial spaces. Patient was scheduled yesterday to go to the OR for removal of carious non-restorable teeth on 9/2. Case was cancelled as patient was noted to be in a-fib w/ RVR. She was started on rate control medication and lovenox anticoagulation. Overnight 9/2 required IV metoprolol for rapid a-fib to 160's. A-fib is now controlled and patient is planned for the OR on 09/06 for extraction of carious non-restorable tooth #4, 5, 7, 8, 9, 10, 25, 31.    Recommendations   - From OMFS standpoint and considering planned OR procedure, patient does NOT need to have Lovenox a/c held prior to procedure. Local measures can/will be utilized for hemostasis. If risk > benefit for holding anticoagulation, Lovenox can be continued with no restrictions from OMFS planned procedure.     - Continue Unasyn 3g q6h while admitted.   - Soft non-chew diet     - Patient will require optimization prior to planned procedure in OR. Anticipate OR Tuesday 9/6 for extraction of carious non-restorable teeth #4, 5, 7, 8, 9, 10, 25, 31.    - Phone call made to Malorie Cano (9/4) who had some questions about which teeth were being removed and voiced her concerns that she only wanted to have *one* tooth removed (Tooth #31). Explanation offered for rationale to remove other obvious non-salvageable teeth and discussed risk for future pain, infection, and need for repeat exposure to general anesthetic. Explained that leaving non-restorable teeth could contribute to symptoms in the near future that can in turn cause difficulty in PO intake and adequate nutrition. Daughter understood rationale and states she is okay with proceeding with initial plan to remove all non-restorable grossly decayed/fractured teeth.     Please pre-op patient as follows:   - NPO past midnight for OR, IVF management per discretion of primary team  - AM labs on Tuesday 9/6: CBC, BMP, PT/PTT/INR  - Maintain an active T+S: O+ (8/30)   - COVID result within 72h - need pre-op Covid PCR    [X] Consent signed and in chart   [x] Medical clearance for OR: Documented 9/3  [X] Cardiac clearance for OR: Documented 9/3  Chest x-ray: 9/2    ECHO: 9/2      Janet Landry  Oral Maxillofacial Surgery  [facility]: [id number]  [facility]: [phone]  Available on Microsoft Teams

## 2022-09-05 NOTE — PROGRESS NOTE ADULT - SUBJECTIVE AND OBJECTIVE BOX
9/3 HD5  Interval Events: Patient was scheduled Friday to go to the OR for removal of carious non-restorable teeth. Case was cancelled as patient was noted to be in a-fib w/ RVR. She was started on rate control medication and lovenox anticoagulation. Overnight required IV metoprolol for rapid a-fib to 160's. Pending Cards work up and optimization for OR. Anticipate OR on Tuesday 09/06/2022 for extraction of carious non-restorable teeth #4, 5, 7, 8, 9, 10, 25, 31. Patient examined bedside. Pain continues to be well controlled, tolerating PO soft and bite sized diet,  AFVSS. Patient was more cooperative this am and allowed for examination for oral cavity, no signs of acute infection.    9/4 HD6:  patient visited yolanda MARTINS. patient has been seen by cardiology with recs for hold anticoag 24hrs prior to OR, magnesium >2 and potassium >4. Patient is currently undergoing optimization by medicine for OR planned now on Tuesday 9/6.     9/5 HD7:  Patient visited yolanda MARTINS. Patient has been seen by cardiology with recs for hold anticoag 24hrs prior to OR, magnesium >2 and potassium >4. Patient is currently undergoing optimization by medicine for OR planned now on Tuesday 9/6. A-fib currently being managed with metoprolol.     HPI: 89 y/o F w PMH of dementia, CAD s/p 3x stents in 2003 initially presented to [facility] ED with daughter complaining of pain on the lower right mandible. HPI collected from daughter. Per daughter, pt reports two week hx of dental pain on the lower right. Pt was seen by outside OMFS earlier this week (Dr Paresh BROWNING), who recommended extraction of teeth #4, 5, 7, 8, 9, 10, 25, 31 under general anesthesia. Pt was scheduled for procedure in October and dismissed on PO course of clindamycin. Per daughter, pt has been unable to take PO abx as it is giving her nausea and pt has been vomiting following administration for meds. Pt has also not been eating per daughter. On 09/02/22 patient was planned to be taken to the operating room for extraction but it was cancelled do to new onset atrial fibration.     PMH: Dementia, CAD, osteoporosis (never on bisphosphonates)  PSH: S/p stents x3 (2003)  Meds: Metoprolol, Trazadone  All: NKDA    PAST MEDICAL & SURGICAL HISTORY:  Dementia  CAD (coronary artery disease)  Osteoporosis    No significant past surgical history  Physical exam:  General: NAD, NC/AT  Extraoral: No swelling, no asymmetry, neck soft, ANDREY 40mm w/o guarding, TMJ FROM  Intraoral: Grossly carious retained roots at teeth #4, 5, 7, 8, 9, 10, 25, 31 exquisitely tender to palpation, no vestibular swelling, floor of mouth soft/not elevated. Uvula midline. No signs of acute infection.   Neuro: AAOx1  Rad: Unable to take dental panoramic radiograph secondary to patients mental status    Vital Signs Last 24 Hrs  T(C): 36.5 (05 Sep 2022 05:55), Max: 36.9 (04 Sep 2022 21:40)  T(F): 97.7 (05 Sep 2022 05:55), Max: 98.5 (04 Sep 2022 21:40)  HR: 89 (05 Sep 2022 05:55) (66 - 116)  BP: 179/89 (05 Sep 2022 05:55) (139/74 - 179/89)  BP(mean): --  RR: 18 (05 Sep 2022 05:55) (17 - 18)  SpO2: 98% (05 Sep 2022 05:55) (98% - 100%)    Parameters below as of 05 Sep 2022 05:55  Patient On (Oxygen Delivery Method): room air      LABS:                                 14.0   9.66  )-----------( 122      ( 04 Sep 2022 05:51 )             41.0   09-04    142  |  104  |  22  ----------------------------<  71  3.9   |  20<L>  |  0.83    Ca    9.0      04 Sep 2022 05:51  Phos  2.6     09-04  Mg     2.20     09-04                  9/3 HD5  Interval Events: Patient was scheduled Friday to go to the OR for removal of carious non-restorable teeth. Case was cancelled as patient was noted to be in a-fib w/ RVR. She was started on rate control medication and lovenox anticoagulation. Overnight required IV metoprolol for rapid a-fib to 160's. Pending Cards work up and optimization for OR. Anticipate OR on Tuesday 09/06/2022 for extraction of carious non-restorable teeth #4, 5, 7, 8, 9, 10, 25, 31. Patient examined bedside. Pain continues to be well controlled, tolerating PO soft and bite sized diet,  AFVSS. Patient was more cooperative this am and allowed for examination for oral cavity, no signs of acute infection.    9/4 HD6:  patient visited yolanda MIR. AVSS. patient has been seen by cardiology with recs for hold anticoag 24hrs prior to OR, magnesium >2 and potassium >4. Patient is currently undergoing optimization by medicine for OR planned now on Tuesday 9/6.     9/5 HD7:  Patient visited yolanda MIR. Hypertensive this morning (179/89). Patient has been seen by cardiology with recs for hold anticoag 24hrs prior to OR, magnesium >2 and potassium >4. Patient is currently undergoing optimization by medicine for OR planned now on Tuesday 9/6. A-fib currently being managed with rate control and a/c.       HPI: 89 y/o F w PMH of dementia, CAD s/p 3x stents in 2003 initially presented to [facility] ED with daughter complaining of pain on the lower right mandible. HPI collected from daughter. Per daughter, pt reports two week hx of dental pain on the lower right. Pt was seen by outside OMFS earlier this week (Dr Paresh BROWNING), who recommended extraction of teeth #4, 5, 7, 8, 9, 10, 25, 31 under general anesthesia. Pt was scheduled for procedure in October and dismissed on PO course of clindamycin. Per daughter, pt has been unable to take PO abx as it is giving her nausea and pt has been vomiting following administration for meds. Pt has also not been eating per daughter. On 09/02/22 patient was planned to be taken to the operating room for extraction but it was cancelled do to new onset atrial fibration.     PMH: Dementia, CAD, osteoporosis (never on bisphosphonates)  PSH: S/p stents x3 (2003)  Meds: Metoprolol, Trazadone  All: NKDA    PAST MEDICAL & SURGICAL HISTORY:  Dementia  CAD (coronary artery disease)  Osteoporosis    No significant past surgical history  Physical exam:  General: NAD, NC/AT  Extraoral: No swelling, no asymmetry, neck soft, ANDREY 40mm w/o guarding, TMJ FROM  Intraoral: Grossly carious retained roots at teeth #4, 5, 7, 8, 9, 10, 25, 31 exquisitely tender to palpation, no vestibular swelling, floor of mouth soft/not elevated. Uvula midline. No signs of acute infection.   Neuro: AAOx1  Rad: Unable to take dental panoramic radiograph secondary to patients mental status    Vital Signs Last 24 Hrs  T(C): 36.5 (05 Sep 2022 05:55), Max: 36.9 (04 Sep 2022 21:40)  T(F): 97.7 (05 Sep 2022 05:55), Max: 98.5 (04 Sep 2022 21:40)  HR: 89 (05 Sep 2022 05:55) (66 - 116)  BP: 179/89 (05 Sep 2022 05:55) (139/74 - 179/89)  BP(mean): --  RR: 18 (05 Sep 2022 05:55) (17 - 18)  SpO2: 98% (05 Sep 2022 05:55) (98% - 100%)    Parameters below as of 05 Sep 2022 05:55  Patient On (Oxygen Delivery Method): room air      LABS:                                 14.0   9.66  )-----------( 122      ( 04 Sep 2022 05:51 )             41.0       09-04    142  |  104  |  22  ----------------------------<  71  3.9   |  20<L>  |  0.83    Ca    9.0      04 Sep 2022 05:51  Phos  2.6     09-04  Mg     2.20     09-04

## 2022-09-05 NOTE — PROGRESS NOTE ADULT - ASSESSMENT
89 y/o woman w/ dementia, CAD s/p stents 2003, and HTN presents to the ER for pain in the Lower R mandible. Admitted for teeth extraction w/ OMFS. Found to have acute urinary retention s/p dyer placement 8/31. UA nml, q 8 hr bladder scans, stool count. AAA (4.8cm) noted on renal US, family does not want to obtain CTA a/p at this time. Addition of trazodone for agitation nightly.    RCRI 9/1 for low risk surgery, dental extraction: 1 point for CAD, Class II Risk. 6.0% 30 day risk of death, MI or cardiac arrest.     # New onset A fib w RVR, 9/2  # HFpEF, newly diagnosed, TTE 9/2022  Family reports NO history of afib on am of 9/2 when discussing. Updated them that we will need to work this up further prior to OR.  She does not follow regularly w cardiology. Does have a hx of stents in 2003, not on asa/statin. Takes metop sux 25 mg daily. Currently does not appear to be in a HF exacerbation-->not dyspneic, no hypoxia   - AC (9/2- ), therapeutic lovenox 1mg/kg q 12 hrs; hold 24 hours prior to dental procedure, HOLD on 9/5 * R/B discussion w family for long term AC  - Resumed metop sux 25 mg daily PO--> transition to metop tartrate for tighter titration while hospitalized, f/u cards rec's --> incr to metop tartrate 25 mg TID, cautious about up titration given recurrent episodes of SVR on tele, D/w cards [ ]  - TTE --> 9/2 moderate AR, severely dilated RA and LA, preserved EF  - Cont tele  - Goal mag of 2, goal K of 4  - Postponed OR date, discussed w family--> cards consult  - F/u EKG, trop- peaked at 40s, pro BNP- 15k    # HTN, POA  Ongoing issue, can start lisinopril 2.5 mg daily w hold parameters.    Phone call to daughter Randi on 9/1, no answer. Discussed on am of 9/2. Bedside update 9/3.

## 2022-09-05 NOTE — PROGRESS NOTE ADULT - SUBJECTIVE AND OBJECTIVE BOX
[facility] Division of Hospital Medicine  Sasha Pete MD  Pager (M-F, 8A-5P): [phone]  Other Times:  [id number]      SUBJECTIVE / OVERNIGHT EVENTS: Pt had a dose of oxycodone this am, now napping. Family at bedside, updated. They report she is very anxious being in the hospital.    Tele reviewed, another episode of a fib w SVR in the 40s this am. RVR overnight in the 130s.     MEDICATIONS  (STANDING):  ampicillin/sulbactam  IVPB 3 Gram(s) IV Intermittent every 8 hours  bisacodyl 5 milliGRAM(s) Oral at bedtime  metoprolol tartrate 25 milliGRAM(s) Oral every 8 hours  polyethylene glycol 3350 17 Gram(s) Oral daily  traZODone 100 milliGRAM(s) Oral at bedtime    MEDICATIONS  (PRN):  acetaminophen     Tablet .. 650 milliGRAM(s) Oral every 6 hours PRN Temp greater or equal to 38C (100.4F), Mild Pain (1 - 3)  haloperidol    Injectable 1 milliGRAM(s) IV Push every 6 hours PRN Agitation  oxyCODONE    IR 2.5 milliGRAM(s) Oral every 6 hours PRN Severe Pain (7 - 10)      I&O's Summary      PHYSICAL EXAM:  Vital Signs Last 24 Hrs  T(C): 36.7 (05 Sep 2022 12:54), Max: 36.9 (04 Sep 2022 21:40)  T(F): 98 (05 Sep 2022 12:54), Max: 98.5 (04 Sep 2022 21:40)  HR: 94 (05 Sep 2022 12:54) (66 - 116)  BP: 146/89 (05 Sep 2022 12:54) (146/89 - 179/89)  BP(mean): --  RR: 19 (05 Sep 2022 12:54) (17 - 19)  SpO2: 100% (05 Sep 2022 12:54) (98% - 100%)    Parameters below as of 05 Sep 2022 12:54  Patient On (Oxygen Delivery Method): room air      CONSTITUTIONAL: NAD, cachectic  EYES: PERRLA; conjunctiva and sclera clear  ENMT: Moist oral mucosa, no pharyngeal injection or exudates; poor dentition  RESPIRATORY: Normal respiratory effort; lungs are clear to auscultation bilaterally  CARDIOVASCULAR: Regular rate and rhythm, normal S1 and S2, no murmur/rub/gallop  ABDOMEN: abd TTP in the suprapubic range, improved from day prior  PSYCH: A+O to person only  SKIN: No rashes; no palpable lesionslesions    LABS:                        14.0   9.66  )-----------( 122      ( 04 Sep 2022 05:51 )             41.0     09-04    142  |  104  |  22  ----------------------------<  71  3.9   |  20<L>  |  0.83    Ca    9.0      04 Sep 2022 05:51  Phos  2.6     09-04  Mg     2.20     09-04                  RADIOLOGY & ADDITIONAL TESTS:  Results Reviewed:   Imaging Personally Reviewed:  Electrocardiogram Personally Reviewed:    COORDINATION OF CARE:  Care Discussed with Consultants/Other Providers [Y/N]: OMFS, Cards  Prior or Outpatient Records Reviewed [Y/N]:   [facility] Division of Hospital Medicine  Sasha Pete MD  Pager (M-F, 8A-5P): [phone]  Other Times:  [phone]      SUBJECTIVE / OVERNIGHT EVENTS: Pt had a dose of oxycodone this am, now napping. Family at bedside, updated. They report she is very anxious being in the hospital.    Tele reviewed, another episode of a fib w SVR in the 40s this am. RVR overnight in the 130s. Notably, pt did not get her scheduled metop last night (missed two doses), not clear why.     MEDICATIONS  (STANDING):  ampicillin/sulbactam  IVPB 3 Gram(s) IV Intermittent every 8 hours  bisacodyl 5 milliGRAM(s) Oral at bedtime  metoprolol tartrate 25 milliGRAM(s) Oral every 8 hours  polyethylene glycol 3350 17 Gram(s) Oral daily  traZODone 100 milliGRAM(s) Oral at bedtime    MEDICATIONS  (PRN):  acetaminophen     Tablet .. 650 milliGRAM(s) Oral every 6 hours PRN Temp greater or equal to 38C (100.4F), Mild Pain (1 - 3)  haloperidol    Injectable 1 milliGRAM(s) IV Push every 6 hours PRN Agitation  oxyCODONE    IR 2.5 milliGRAM(s) Oral every 6 hours PRN Severe Pain (7 - 10)      I&O's Summary      PHYSICAL EXAM:  Vital Signs Last 24 Hrs  T(C): 36.7 (05 Sep 2022 12:54), Max: 36.9 (04 Sep 2022 21:40)  T(F): 98 (05 Sep 2022 12:54), Max: 98.5 (04 Sep 2022 21:40)  HR: 94 (05 Sep 2022 12:54) (66 - 116)  BP: 146/89 (05 Sep 2022 12:54) (146/89 - 179/89)  BP(mean): --  RR: 19 (05 Sep 2022 12:54) (17 - 19)  SpO2: 100% (05 Sep 2022 12:54) (98% - 100%)    Parameters below as of 05 Sep 2022 12:54  Patient On (Oxygen Delivery Method): room air      CONSTITUTIONAL: NAD, cachectic  EYES: PERRLA; conjunctiva and sclera clear  ENMT: Moist oral mucosa, no pharyngeal injection or exudates; poor dentition  RESPIRATORY: Normal respiratory effort; lungs are clear to auscultation bilaterally  CARDIOVASCULAR: Regular rate and rhythm, normal S1 and S2, no murmur/rub/gallop  ABDOMEN: abd TTP in the suprapubic range, improved from day prior  PSYCH: A+O to person only  SKIN: No rashes; no palpable lesionslesions    LABS:                        14.0   9.66  )-----------( 122      ( 04 Sep 2022 05:51 )             41.0     09-04    142  |  104  |  22  ----------------------------<  71  3.9   |  20<L>  |  0.83    Ca    9.0      04 Sep 2022 05:51  Phos  2.6     09-04  Mg     2.20     09-04                  RADIOLOGY & ADDITIONAL TESTS:  Results Reviewed:   Imaging Personally Reviewed:  Electrocardiogram Personally Reviewed:    COORDINATION OF CARE:  Care Discussed with Consultants/Other Providers [Y/N]: OMFS, Cards  Prior or Outpatient Records Reviewed [Y/N]:

## 2022-09-06 DIAGNOSIS — I48.91 UNSPECIFIED ATRIAL FIBRILLATION: ICD-10-CM

## 2022-09-06 DIAGNOSIS — R33.9 RETENTION OF URINE, UNSPECIFIED: ICD-10-CM

## 2022-09-06 LAB
ANION GAP SERPL CALC-SCNC: 14 MMOL/L — SIGNIFICANT CHANGE UP (ref 7–14)
BUN SERPL-MCNC: 18 MG/DL — SIGNIFICANT CHANGE UP (ref 7–23)
CALCIUM SERPL-MCNC: 9.1 MG/DL — SIGNIFICANT CHANGE UP (ref 8.4–10.5)
CHLORIDE SERPL-SCNC: 108 MMOL/L — HIGH (ref 98–107)
CO2 SERPL-SCNC: 25 MMOL/L — SIGNIFICANT CHANGE UP (ref 22–31)
CREAT SERPL-MCNC: 0.77 MG/DL — SIGNIFICANT CHANGE UP (ref 0.5–1.3)
EGFR: 70 ML/MIN/1.73M2 — SIGNIFICANT CHANGE UP
GLUCOSE SERPL-MCNC: 93 MG/DL — SIGNIFICANT CHANGE UP (ref 70–99)
HCT VFR BLD CALC: 42.2 % — SIGNIFICANT CHANGE UP (ref 34.5–45)
HGB BLD-MCNC: 13.5 G/DL — SIGNIFICANT CHANGE UP (ref 11.5–15.5)
MAGNESIUM SERPL-MCNC: 2 MG/DL — SIGNIFICANT CHANGE UP (ref 1.6–2.6)
MCHC RBC-ENTMCNC: 29.9 PG — SIGNIFICANT CHANGE UP (ref 27–34)
MCHC RBC-ENTMCNC: 32 GM/DL — SIGNIFICANT CHANGE UP (ref 32–36)
MCV RBC AUTO: 93.4 FL — SIGNIFICANT CHANGE UP (ref 80–100)
NRBC # BLD: 0 /100 WBCS — SIGNIFICANT CHANGE UP (ref 0–0)
NRBC # FLD: 0 K/UL — SIGNIFICANT CHANGE UP (ref 0–0)
PHOSPHATE SERPL-MCNC: 2 MG/DL — LOW (ref 2.5–4.5)
PLATELET # BLD AUTO: 119 K/UL — LOW (ref 150–400)
POTASSIUM SERPL-MCNC: 4.1 MMOL/L — SIGNIFICANT CHANGE UP (ref 3.5–5.3)
POTASSIUM SERPL-SCNC: 4.1 MMOL/L — SIGNIFICANT CHANGE UP (ref 3.5–5.3)
RBC # BLD: 4.52 M/UL — SIGNIFICANT CHANGE UP (ref 3.8–5.2)
RBC # FLD: 14.1 % — SIGNIFICANT CHANGE UP (ref 10.3–14.5)
SODIUM SERPL-SCNC: 147 MMOL/L — HIGH (ref 135–145)
WBC # BLD: 10.58 K/UL — HIGH (ref 3.8–10.5)
WBC # FLD AUTO: 10.58 K/UL — HIGH (ref 3.8–10.5)

## 2022-09-06 PROCEDURE — 99233 SBSQ HOSP IP/OBS HIGH 50: CPT

## 2022-09-06 DEVICE — SURGIFOAM PAD 8CM X 12.5CM X 2MM (100C): Type: IMPLANTABLE DEVICE | Status: FUNCTIONAL

## 2022-09-06 DEVICE — SURGICEL 2 X 14": Type: IMPLANTABLE DEVICE | Status: FUNCTIONAL

## 2022-09-06 RX ORDER — ONDANSETRON 8 MG/1
4 TABLET, FILM COATED ORAL ONCE
Refills: 0 | Status: DISCONTINUED | OUTPATIENT
Start: 2022-09-06 | End: 2022-09-06

## 2022-09-06 RX ORDER — SODIUM CHLORIDE 9 MG/ML
1000 INJECTION INTRAMUSCULAR; INTRAVENOUS; SUBCUTANEOUS
Refills: 0 | Status: COMPLETED | OUTPATIENT
Start: 2022-09-06 | End: 2022-09-06

## 2022-09-06 RX ORDER — HYDRALAZINE HCL 50 MG
2.5 TABLET ORAL ONCE
Refills: 0 | Status: COMPLETED | OUTPATIENT
Start: 2022-09-06 | End: 2022-09-06

## 2022-09-06 RX ORDER — FENTANYL CITRATE 50 UG/ML
12.5 INJECTION INTRAVENOUS
Refills: 0 | Status: DISCONTINUED | OUTPATIENT
Start: 2022-09-06 | End: 2022-09-06

## 2022-09-06 RX ORDER — LISINOPRIL 2.5 MG/1
5 TABLET ORAL DAILY
Refills: 0 | Status: DISCONTINUED | OUTPATIENT
Start: 2022-09-07 | End: 2022-09-12

## 2022-09-06 RX ORDER — METOPROLOL TARTRATE 50 MG
5 TABLET ORAL ONCE
Refills: 0 | Status: COMPLETED | OUTPATIENT
Start: 2022-09-06 | End: 2022-09-06

## 2022-09-06 RX ORDER — QUETIAPINE FUMARATE 200 MG/1
12.5 TABLET, FILM COATED ORAL AT BEDTIME
Refills: 0 | Status: DISCONTINUED | OUTPATIENT
Start: 2022-09-06 | End: 2022-09-12

## 2022-09-06 RX ORDER — FENTANYL CITRATE 50 UG/ML
25 INJECTION INTRAVENOUS ONCE
Refills: 0 | Status: DISCONTINUED | OUTPATIENT
Start: 2022-09-06 | End: 2022-09-06

## 2022-09-06 RX ORDER — LISINOPRIL 2.5 MG/1
2.5 TABLET ORAL ONCE
Refills: 0 | Status: COMPLETED | OUTPATIENT
Start: 2022-09-06 | End: 2022-09-06

## 2022-09-06 RX ORDER — ACETAMINOPHEN 500 MG
650 TABLET ORAL EVERY 6 HOURS
Refills: 0 | Status: DISCONTINUED | OUTPATIENT
Start: 2022-09-06 | End: 2022-09-12

## 2022-09-06 RX ADMIN — SODIUM CHLORIDE 75 MILLILITER(S): 9 INJECTION INTRAMUSCULAR; INTRAVENOUS; SUBCUTANEOUS at 17:43

## 2022-09-06 RX ADMIN — HALOPERIDOL DECANOATE 1 MILLIGRAM(S): 100 INJECTION INTRAMUSCULAR at 02:36

## 2022-09-06 RX ADMIN — AMPICILLIN SODIUM AND SULBACTAM SODIUM 200 GRAM(S): 250; 125 INJECTION, POWDER, FOR SUSPENSION INTRAMUSCULAR; INTRAVENOUS at 16:38

## 2022-09-06 RX ADMIN — AMPICILLIN SODIUM AND SULBACTAM SODIUM 200 GRAM(S): 250; 125 INJECTION, POWDER, FOR SUSPENSION INTRAMUSCULAR; INTRAVENOUS at 23:07

## 2022-09-06 RX ADMIN — AMPICILLIN SODIUM AND SULBACTAM SODIUM 200 GRAM(S): 250; 125 INJECTION, POWDER, FOR SUSPENSION INTRAMUSCULAR; INTRAVENOUS at 05:52

## 2022-09-06 RX ADMIN — Medication 5 MILLIGRAM(S): at 09:04

## 2022-09-06 RX ADMIN — Medication 2.5 MILLIGRAM(S): at 01:45

## 2022-09-06 RX ADMIN — Medication 25 MILLIGRAM(S): at 17:28

## 2022-09-06 RX ADMIN — LISINOPRIL 2.5 MILLIGRAM(S): 2.5 TABLET ORAL at 19:43

## 2022-09-06 RX ADMIN — Medication 2.5 MILLIGRAM(S): at 23:06

## 2022-09-06 RX ADMIN — Medication 5 MILLIGRAM(S): at 00:45

## 2022-09-06 RX ADMIN — POLYETHYLENE GLYCOL 3350 17 GRAM(S): 17 POWDER, FOR SOLUTION ORAL at 16:39

## 2022-09-06 NOTE — BRIEF OPERATIVE NOTE - OPERATION/FINDINGS
Extraction of Tooth #'s 4, 5, 7, 8, 9, 10, 25, 31, surgical sites packed with surgicel and gelfoam and closed with resorbable sutures. Hemostatic post op.

## 2022-09-06 NOTE — PROGRESS NOTE ADULT - SUBJECTIVE AND OBJECTIVE BOX
9/3 HD5  Interval Events: Patient was scheduled Friday to go to the OR for removal of carious non-restorable teeth. Case was cancelled as patient was noted to be in a-fib w/ RVR. She was started on rate control medication and lovenox anticoagulation. Overnight required IV metoprolol for rapid a-fib to 160's. Pending Cards work up and optimization for OR. Anticipate OR on Tuesday 09/06/2022 for extraction of carious non-restorable teeth #4, 5, 7, 8, 9, 10, 25, 31. Patient examined bedside. Pain continues to be well controlled, tolerating PO soft and bite sized diet,  AFVSS. Patient was more cooperative this am and allowed for examination for oral cavity, no signs of acute infection.    9/4 HD6:  patient visited yolanda MIR. AVSS. patient has been seen by cardiology with recs for hold anticoag 24hrs prior to OR, magnesium >2 and potassium >4. Patient is currently undergoing optimization by medicine for OR planned now on Tuesday 9/6.     9/5 HD7:  Patient visited yolanda MIR. Hypertensive this morning (179/89). Patient has been seen by cardiology with recs for hold anticoag 24hrs prior to OR, magnesium >2 and potassium >4. Patient is currently undergoing optimization by medicine for OR planned now on Tuesday 9/6. A-fib currently being managed with rate control and a/c.     9/6 HD8:  patient visited bedside. Patient had 2 instances of High blood pressure readings during the night (diastolic >100) and was placed on hydralazine. Patient resting comfortably now. vitals signs stable from hydralazine administration. anticoags have been held for 24 hours prior to OR. Patient is planned for OR today 9/6 for extraction of #4,5,7,8,9,10, 25, 31. patient is being optimized for OR by medical team.       HPI: 89 y/o F w PMH of dementia, CAD s/p 3x stents in 2003 initially presented to [facility] ED with daughter complaining of pain on the lower right mandible. HPI collected from daughter. Per daughter, pt reports two week hx of dental pain on the lower right. Pt was seen by outside OMFS earlier this week (Dr Paresh BROWNING), who recommended extraction of teeth #4, 5, 7, 8, 9, 10, 25, 31 under general anesthesia. Pt was scheduled for procedure in October and dismissed on PO course of clindamycin. Per daughter, pt has been unable to take PO abx as it is giving her nausea and pt has been vomiting following administration for meds. Pt has also not been eating per daughter. On 09/02/22 patient was planned to be taken to the operating room for extraction but it was cancelled do to new onset atrial fibration.     PMH: Dementia, CAD, osteoporosis (never on bisphosphonates)  PSH: S/p stents x3 (2003)  Meds: Metoprolol, Trazadone  All: NKDA    PAST MEDICAL & SURGICAL HISTORY:  Dementia  CAD (coronary artery disease)  Osteoporosis    No significant past surgical history  Physical exam:  General: NAD, NC/AT  Extraoral: No swelling, no asymmetry, neck soft, ANDREY 40mm w/o guarding, TMJ FROM  Intraoral: Grossly carious retained roots at teeth #4, 5, 7, 8, 9, 10, 25, 31 exquisitely tender to palpation, no vestibular swelling, floor of mouth soft/not elevated. Uvula midline. No signs of acute infection.   Neuro: AAOx1  Rad: Unable to take dental panoramic radiograph secondary to patients mental status    Vital Signs Last 24 Hrs  T(C): 36.8 (06 Sep 2022 05:40), Max: 36.8 (06 Sep 2022 05:40)  T(F): 98.2 (06 Sep 2022 05:40), Max: 98.2 (06 Sep 2022 05:40)  HR: 75 (06 Sep 2022 05:40) (67 - 94)  BP: 166/99 (06 Sep 2022 05:40) (146/89 - 192/102)  BP(mean): --  RR: 17 (06 Sep 2022 05:40) (17 - 19)  SpO2: 98% (06 Sep 2022 05:40) (98% - 100%)    Parameters below as of 06 Sep 2022 05:40  Patient On (Oxygen Delivery Method): room air      LABS:             09-06    147<H>  |  108<H>  |  18  ----------------------------<  93  4.1   |  25  |  0.77    Ca    9.1      06 Sep 2022 06:05  Phos  2.0     09-06  Mg     2.00     09-06                          13.5   10.58 )-----------( 119      ( 06 Sep 2022 06:05 )             42.2

## 2022-09-06 NOTE — PROGRESS NOTE ADULT - ASSESSMENT
89F with advanced dementia, CAD with prior stents, HTN, presents with lower right mandible pain requiring teeth extraction with general anesthesia. Cardiology called for cardiac optimization      #Afib  -Cont metoprolol 25mg PO BID, can increase to 50mg po BID if rates uncontrolled.   -CHADVASC: 5, continue AC, defer to surgery AC management during perioperative period.   - K;4, Mg; 2  - no further cardiac testing prior to procedure   Cardiovascular Risk Stratification - RCRI =  (1).  1. History of ischemic heart disease: 1  2. History of congestive heart failure: 0  3. History of cerebrovascular disease (stroke or transient ischemic attack): 0  4. History of diabetes requiring preoperative insulin use: 0  5. Chronic kidney disease (creatinine > 2 mg/dL): 0  6. Undergoing suprainguinal vascular, intraperitoneal, or intrathoracic surgery: 0  0 predictors = 0.4%, 1 predictor = 0.9%, 2 predictors = 6.6%, =3 predictors = >11%    - Overall this patient is as 0.9% risk (for cardiac death, nonfatal myocardial infarction, and nonfatal cardiac arrest perioperatively for this low risk procedure).

## 2022-09-06 NOTE — CHART NOTE - NSCHARTNOTEFT_GEN_A_CORE
ORAL AND MAXILLOFACIAL SURGERY POST OP NOTE    Pre-Op Dx:  Dentofacial Deformity  Procedure:  Extraction of Tooth #4, 5, 7, 8, 9, 10, 25, 31  Surgeon:  Dr. Basia Kebede    SUBJECTIVE: :   Patient examined at admitting floor bed. STEPHANIE since OR. Patient recovering uneventfully. Vitals WNR. No PO intake since OR. Patient resting in supine position/sleeping. Does not provide clear answer when asked if she was in pain.     Objective:  Gen: NAD, AAOx1  EOE: No acute facial swelling. No Trismus. Right parasymphysis ecchymosis consistent with jaw support provided during extractions. Dried blood at lips.   IOE: Surgical sites hemostatic, sutures in place. Tissue well approximated. FOM soft. Clotted blood cleared from mouth.     Vital Signs Last 24 Hrs  T(C): 36.7 (06 Sep 2022 14:40), Max: 36.9 (06 Sep 2022 08:56)  T(F): 98.1 (06 Sep 2022 14:40), Max: 98.5 (06 Sep 2022 08:56)  HR: 85 (06 Sep 2022 14:40) (67 - 105)  BP: 148/88 (06 Sep 2022 14:40) (146/71 - 192/102)  BP(mean): 110 (06 Sep 2022 13:00) (90 - 110)  RR: 18 (06 Sep 2022 14:40) (14 - 20)  SpO2: 97% (06 Sep 2022 14:40) (97% - 100%)      89 y/o F w PMH of dementia, CAD s/p 3x stents in 2003 presented to [facility] ED with daughter complaining of pain on the lower right mandible for the last two weeks. Pt has multiple non-restorable, grossly carious teeth- #4, 5, 7, 8, 9, 10, 25, 31. Lower right molar tooth #31 currently symptomatic. No sign of airway compromise, no intraoral / extraoral swelling or signs of infection spread to any of the fascial spaces. Patient was scheduled to go to the OR for removal of carious non-restorable teeth on 9/2. Case was cancelled as patient was noted to be in a-fib w/ RVR. She was started on rate control medication and lovenox anticoagulation. Overnight 9/2 required IV metoprolol for rapid a-fib to 160's. A-fib is now controlled. Patient now s/p Extraction of Tooth #'s 4, 5, 7, 8, 9, 10, 25, 31 on 9/6.       - STEPHANIE since OR, recovering well from procedure post operatively.   - HOB elevated 30 degrees   - No suctioning of mouth to avoid disturbing surgical site clots.   - C/w IV Abx - Unasyn  - C/w Pain control   - Encourage PO liquid diet today and then advance diet as tolerated tomorrow afternoon.       Janet Landry  Oral Maxillofacial Surgery  [facility]: [id number]  [facility]: [phone]  Available on [employer] Teams ORAL AND MAXILLOFACIAL SURGERY POST OP NOTE    Pre-Op Dx:  Non-restorable teeth  Procedure:  Extraction of Tooth #4, 5, 7, 8, 9, 10, 25, 31  Surgeon:  Dr. Basia Kebede    SUBJECTIVE: :   Patient examined at admitting floor bed. STEPHANIE since OR. Patient recovering uneventfully. Vitals WNR. No PO intake since OR. Patient resting in supine position/sleeping. Does not provide clear answer when asked if she was in pain.     Objective:  Gen: NAD, AAOx1  EOE: No acute facial swelling. No Trismus. Right parasymphysis ecchymosis consistent with jaw support provided during extractions. Dried blood at lips.   IOE: Surgical sites hemostatic, sutures in place. Tissue well approximated. FOM soft. Clotted blood cleared from mouth.     Vital Signs Last 24 Hrs  T(C): 36.7 (06 Sep 2022 14:40), Max: 36.9 (06 Sep 2022 08:56)  T(F): 98.1 (06 Sep 2022 14:40), Max: 98.5 (06 Sep 2022 08:56)  HR: 85 (06 Sep 2022 14:40) (67 - 105)  BP: 148/88 (06 Sep 2022 14:40) (146/71 - 192/102)  BP(mean): 110 (06 Sep 2022 13:00) (90 - 110)  RR: 18 (06 Sep 2022 14:40) (14 - 20)  SpO2: 97% (06 Sep 2022 14:40) (97% - 100%)      89 y/o F w PMH of dementia, CAD s/p 3x stents in 2003 presented to [facility] ED with daughter complaining of pain on the lower right mandible for the last two weeks. Pt has multiple non-restorable, grossly carious teeth- #4, 5, 7, 8, 9, 10, 25, 31. Lower right molar tooth #31 currently symptomatic. No sign of airway compromise, no intraoral / extraoral swelling or signs of infection spread to any of the fascial spaces. Patient was scheduled to go to the OR for removal of carious non-restorable teeth on 9/2. Case was cancelled as patient was noted to be in a-fib w/ RVR. She was started on rate control medication and lovenox anticoagulation. Overnight 9/2 required IV metoprolol for rapid a-fib to 160's. A-fib is now controlled. Patient now s/p Extraction of Tooth #'s 4, 5, 7, 8, 9, 10, 25, 31 on 9/6.       - STEPHANIE since OR, recovering well from procedure post operatively.   - HOB elevated 30 degrees   - No suctioning of mouth to avoid disturbing surgical site clots.   - C/w IV Abx - Unasyn  - C/w Pain control   - Encourage PO liquid diet today and then advance diet as tolerated tomorrow afternoon.       Janet Landry  Oral Maxillofacial Surgery  [facility]: [id number]  [facility]: [phone]  Available on Microsoft Teams

## 2022-09-06 NOTE — PROGRESS NOTE ADULT - ASSESSMENT
89F Dementia, CAD/Stent, HTN, p/w Rt lower teeth extraction by OMFS c/b urinary retention s/p dyer (8/31), 4.8cm AAA – no further w/u as per fam, New Afib w/ RVR

## 2022-09-06 NOTE — PROGRESS NOTE ADULT - ASSESSMENT
89 y/o F w PMH of dementia, CAD s/p 3x stents in 2003 presented to [facility] ED with daughter complaining of pain on the lower right mandible for the last two weeks. Pt has multiple non-restorable, grossly carious teeth- #4, 5, 7, 8, 9, 10, 25, 31. Lower right molar tooth #31 currently symptomatic. No sign of airway compromise, no intraoral / extraoral swelling or signs of infection spread to any of the fascial spaces. Patient was scheduled to go to the OR for removal of carious non-restorable teeth on 9/2. Case was cancelled as patient was noted to be in a-fib w/ RVR. She was started on rate control medication and lovenox anticoagulation. Overnight 9/2 required IV metoprolol for rapid a-fib to 160's. A-fib is now controlled and patient is planned for the OR today on 09/06 for extraction of carious non-restorable tooth #4, 5, 7, 8, 9, 10, 25, 31.    Recommendations   - Continue Unasyn 3g q6h while admitted.   - NPO WITH MEDs prior to procedure  - patient will be planned for OR today 9/6 for extraction of #4,5,7,8,9,10,25,31    - Phone call made to Malorie Cano (9/4) who had some questions about which teeth were being removed and voiced her concerns that she only wanted to have *one* tooth removed (Tooth #31). Explanation offered for rationale to remove other obvious non-salvageable teeth and discussed risk for future pain, infection, and need for repeat exposure to general anesthetic. Explained that leaving non-restorable teeth could contribute to symptoms in the near future that can in turn cause difficulty in PO intake and adequate nutrition. Daughter understood rationale and states she is okay with proceeding with initial plan to remove all non-restorable grossly decayed/fractured teeth.     Please pre-op patient as follows:   - IVF management per discretion of primary team  - AM labs on Tuesday 9/6: CBC, BMP, PT/PTT/INR  - Maintain an active T+S: O+ (8/30)     [X] Consent signed and in chart   [x] Medical clearance for OR: Documented 9/3  [X] Cardiac clearance for OR: Documented 9/3  Chest x-ray: 9/2    ECHO: 9/2

## 2022-09-06 NOTE — BRIEF OPERATIVE NOTE - COMMENTS
- Liquid diet for 24 hours post op  - Head of bed elevated 30 degrees  - Avoid suctioning of mouth, if noted to be bleeding or pooling blood, please page OMFS [phone]  - Avoid retraction of lips.

## 2022-09-06 NOTE — PROGRESS NOTE ADULT - ATTENDING COMMENTS
personally saw and examined patient  labs and vitals reviewed  agree with above assessment and plan  pt is now s/p tooth extraction without CV complications  cont care per primary team

## 2022-09-06 NOTE — PROGRESS NOTE ADULT - SUBJECTIVE AND OBJECTIVE BOX
Date of Admission:  8/30/22    Updates: No concerns or complaints overnight.       HISTORY OF PRESENT ILLNESS:  89 y/o woman w/ dementia, CAD s/p stents 2003, and HTN presents to the ER for pain in the Lower R mandible. Collateral obtained from daughter and granddaughter at bedside as pt is a poor historian. She has been complaining of R lower tooth pain since last Wednesday and has not been eating much due to pain. She was seen by [facility] outpatient who recommended extraction of several teeth under anesthesia as pt is fully dep on ADLs and very anxious/confused. The procedure was scheduled to be done in October, however patient has become more symptomatic and is now scheduled to have teeth extracted in the OR on this admission with anesthesia.  Cardiology consulted for cardiac optimization.    Allergies    No Known Allergies    Intolerances    	    MEDICATIONS:  enoxaparin Injectable 45 milliGRAM(s) SubCutaneous every 12 hours    ampicillin/sulbactam  IVPB 3 Gram(s) IV Intermittent every 8 hours      acetaminophen     Tablet .. 650 milliGRAM(s) Oral every 6 hours PRN  haloperidol    Injectable 1 milliGRAM(s) IV Push every 6 hours PRN  oxyCODONE    IR 2.5 milliGRAM(s) Oral every 6 hours PRN  traZODone 100 milliGRAM(s) Oral at bedtime    bisacodyl 5 milliGRAM(s) Oral at bedtime  polyethylene glycol 3350 17 Gram(s) Oral daily  magnesium sulfate  IVPB 1 Gram(s) IV Intermittent once    PAST MEDICAL & SURGICAL HISTORY:  Dementia  CAD (coronary artery disease)  Osteoporosis    No significant past surgical history      FAMILY HISTORY:  No pertinent family history in first degree relatives      REVIEW OF SYSTEMS:  See HPI. Otherwise, 10 point ROS done and otherwise negative.      T(C): 36.6 (09-03-22 @ 13:22), Max: 36.8 (09-02-22 @ 14:35)  HR: 92 (09-03-22 @ 13:22) (89 - 160)  BP: 160/98 (09-03-22 @ 13:22) (148/85 - 174/103)  RR: 18 (09-03-22 @ 13:22) (17 - 18)  SpO2: 99% (09-03-22 @ 08:43) (98% - 100%)  Wt(kg): --  I&O's Summary      Physical Exam:  General: cachectic, NAD  Cardiovascular: irregular rate and rhythm, no JVD  Respiratory: Lungs clear to auscultation	  Gastrointestinal:  Soft, Non-tender, + BS	  Skin: warm and dry, No rashes, No ecchymoses, No cyanosis	  Extremities:  No clubbing, cyanosis or edema  Vascular: Peripheral pulses palpable 2+ bilaterally    LABS:	   	    CBC Full  -  ( 03 Sep 2022 06:12 )  WBC Count : 10.11 K/uL  Hemoglobin : 13.7 g/dL  Hematocrit : 42.0 %  Platelet Count - Automated : 128 K/uL  Mean Cell Volume : 90.9 fL  Mean Cell Hemoglobin : 29.7 pg  Mean Cell Hemoglobin Concentration : 32.6 gm/dL  Auto Neutrophil # : x  Auto Lymphocyte # : x  Auto Monocyte # : x  Auto Eosinophil # : x  Auto Basophil # : x  Auto Neutrophil % : x  Auto Lymphocyte % : x  Auto Monocyte % : x  Auto Eosinophil % : x  Auto Basophil % : x    09-03    143  |  103  |  20  ----------------------------<  78  3.5   |  22  |  0.90  09-02    145  |  108<H>  |  14  ----------------------------<  71  3.6   |  26  |  0.88    Ca    9.0      03 Sep 2022 06:12  Ca    9.0      02 Sep 2022 06:40  Phos  2.8     09-03  Phos  2.7     09-02  Mg     1.90     09-03  Mg     1.90     09-02        proBNP: Serum Pro-Brain Natriuretic Peptide: 39024 pg/mL (09-03 @ 06:12)      < from: Transthoracic Echocardiogram (09.02.22 @ 13:57) >    Patient name: JAMAR HOLLIDAY  YOB: 1924   Age: 89 (F)   MR#: [id number]  Study Date: 9/2/2022  Location: [id number]: Adamaris Cullen RDCS  Study quality: Technically good  Referring Physician: Sasha Pete MD  Blood Pressure: 147/89 mmHg  Height: 157 cm  Weight: 46 kg  BSA: 1.4 m2  ------------------------------------------------------------------------  PROCEDURE: Transthoracic echocardiogram with 2-D, M-Mode  and complete spectral and color flow Doppler.  INDICATION: Unspecified atrial fibrillation (I48.91)  ------------------------------------------------------------------------  DIMENSIONS:  Dimensions:     Normal Values:  LA:     4.0 cm    2.0 - 4.0 cm  Ao:     3.3 cm    2.0 - 3.8 cm  SEPTUM: 0.7 cm    0.6 - 1.2 cm  PWT:    0.7 cm    0.6 - 1.1 cm  LVIDd:  3.8 cm    3.0 - 5.6 cm  LVIDs:  2.6 cm    1.8 - 4.0 cm  Derived Variables:  LVMI: 50 g/m2  RWT: 0.36  Fractional short: 32 %  Ejection Fraction (Schuler Rule): 60 %  ------------------------------------------------------------------------  OBSERVATIONS:  Mitral Valve: Mitral annular calcification, otherwise  normal mitral valve. Mild mitral regurgitation.  Aortic Root: Normal size aortic root. (Ao:3.3 cm).  Aortic Valve: Calcified trileaflet aortic valve with normal  opening. Mild-moderate aortic regurgitation.  Left Atrium: Severely dilated left atrium.  LA volume index  = 56 cc/m2.  Left Ventricle: Normal left ventricular systolic function.  No segmental wall motion abnormalities. Normal left  ventricular internal dimensions and wall thicknesses.  Right Heart: Severe right atrial enlargement. Normal right  ventricular size and function. Normal tricuspid valve.  Mild tricuspid regurgitation. Normal pulmonic valve.  Mild  pulmonic regurgitation.  Pericardium/PleuraNormal pericardium with no pericardial  effusion.  ------------------------------------------------------------------------  CONCLUSIONS:  1. Mitral annular calcification, otherwise normal mitral  valve. Mild mitral regurgitation.  2. Calcified trileaflet aortic valve with normal opening.  Mild-moderate aortic regurgitation.  3. Severely dilated left atrium.  LA volume index = 56  cc/m2.  4. Normal left ventricular internal dimensions and wall  thicknesses.  5. Normal left ventricular systolic function. No segmental  wall motion abnormalities.  6. Severe right atrial enlargement.  7. Normal right ventricular size and function.  ------------------------------------------------------------------------  Confirmed on  9/2/2022 - 16:01:42 by Jeff Sosa M.D.,  [facility], [facility]  ------------------------------------------------------------------------    < end of copied text >    TELEMETRY:  Atrial fibrillation

## 2022-09-06 NOTE — PROGRESS NOTE ADULT - SUBJECTIVE AND OBJECTIVE BOX
[facility] Division of Hospital Medicine  Noel Tong MD  Pager (M-F, 8A-5P): [phone]  Other Times:  [phone]    Patient is a 89y old  Female who presents with a chief complaint of Tooth infection (06 Sep 2022 12:16)    SUBJECTIVE / OVERNIGHT EVENTS:  Patient returned after multiple teeth extraction.  Offers no new complaints at this time.  History taking limited due to dementia.  No overnight issues with F/C, vomiting, SOB, Cough, diarrhea.    MEDICATIONS  (STANDING):  ampicillin/sulbactam  IVPB 3 Gram(s) IV Intermittent every 8 hours  bisacodyl 5 milliGRAM(s) Oral at bedtime  lisinopril 2.5 milliGRAM(s) Oral once  metoprolol tartrate 25 milliGRAM(s) Oral every 8 hours  polyethylene glycol 3350 17 Gram(s) Oral daily  QUEtiapine 12.5 milliGRAM(s) Oral at bedtime  sodium chloride 0.9%. 1000 milliLiter(s) (75 mL/Hr) IV Continuous <Continuous>  traZODone 100 milliGRAM(s) Oral at bedtime    MEDICATIONS  (PRN):  acetaminophen     Tablet .. 650 milliGRAM(s) Oral every 6 hours PRN Mild Pain (1 - 3), Moderate Pain (4 - 6)  fentaNYL    Injectable 12.5 MICROGram(s) IV Push every 5 minutes PRN Moderate Pain (4 - 6)  fentaNYL    Injectable 25 MICROGram(s) IV Push once PRN Severe Pain (7 - 10)  haloperidol    Injectable 1 milliGRAM(s) IV Push every 6 hours PRN Agitation  ondansetron Injectable 4 milliGRAM(s) IV Push once PRN Nausea and/or Vomiting  oxyCODONE    IR 2.5 milliGRAM(s) Oral every 6 hours PRN Severe Pain (7 - 10)      Vital Signs Last 24 Hrs  T(C): 36.4 (06 Sep 2022 12:10), Max: 36.9 (06 Sep 2022 08:56)  T(F): 97.5 (06 Sep 2022 12:10), Max: 98.5 (06 Sep 2022 08:56)  HR: 79 (06 Sep 2022 13:15) (67 - 105)  BP: 171/87 (06 Sep 2022 13:00) (146/71 - 192/102)  BP(mean): 110 (06 Sep 2022 13:00) (90 - 110)  RR: 14 (06 Sep 2022 13:15) (14 - 20)  SpO2: 97% (06 Sep 2022 13:15) (97% - 100%)    Parameters below as of 06 Sep 2022 12:30  Patient On (Oxygen Delivery Method): room air      CAPILLARY BLOOD GLUCOSE        I&O's Summary      PHYSICAL EXAM:  CONSTITUTIONAL: NAD, cachectic  EYES: PERRLA; conjunctiva and sclera clear  ENMT: Moist oral mucosa, no pharyngeal injection or exudates; dried blood around the lips.  RESPIRATORY: Normal respiratory effort; lungs are clear to auscultation bilaterally  CARDIOVASCULAR: Regular rate and rhythm, normal S1 and S2, no murmur/rub/gallop  ABDOMEN: abd TTP in the suprapubic range, improved from day prior  PSYCH: A+O to person only  SKIN: No rashes; no palpable lesions    LABS:                        13.5   10.58 )-----------( 119      ( 06 Sep 2022 06:05 )             42.2     09-06    147<H>  |  108<H>  |  18  ----------------------------<  93  4.1   |  25  |  0.77    Ca    9.1      06 Sep 2022 06:05  Phos  2.0     09-06  Mg     2.00     09-06                RADIOLOGY & ADDITIONAL TESTS:    Imaging Personally Reviewed:    Care Discussed with Consultants/Other Providers:

## 2022-09-07 LAB
ANION GAP SERPL CALC-SCNC: 14 MMOL/L — SIGNIFICANT CHANGE UP (ref 7–14)
BUN SERPL-MCNC: 14 MG/DL — SIGNIFICANT CHANGE UP (ref 7–23)
CALCIUM SERPL-MCNC: 8.6 MG/DL — SIGNIFICANT CHANGE UP (ref 8.4–10.5)
CHLORIDE SERPL-SCNC: 103 MMOL/L — SIGNIFICANT CHANGE UP (ref 98–107)
CO2 SERPL-SCNC: 23 MMOL/L — SIGNIFICANT CHANGE UP (ref 22–31)
CREAT SERPL-MCNC: 0.7 MG/DL — SIGNIFICANT CHANGE UP (ref 0.5–1.3)
EGFR: 79 ML/MIN/1.73M2 — SIGNIFICANT CHANGE UP
GLUCOSE SERPL-MCNC: 82 MG/DL — SIGNIFICANT CHANGE UP (ref 70–99)
HCT VFR BLD CALC: 37.6 % — SIGNIFICANT CHANGE UP (ref 34.5–45)
HGB BLD-MCNC: 12.2 G/DL — SIGNIFICANT CHANGE UP (ref 11.5–15.5)
MAGNESIUM SERPL-MCNC: 1.9 MG/DL — SIGNIFICANT CHANGE UP (ref 1.6–2.6)
MCHC RBC-ENTMCNC: 30 PG — SIGNIFICANT CHANGE UP (ref 27–34)
MCHC RBC-ENTMCNC: 32.4 GM/DL — SIGNIFICANT CHANGE UP (ref 32–36)
MCV RBC AUTO: 92.6 FL — SIGNIFICANT CHANGE UP (ref 80–100)
NRBC # BLD: 0 /100 WBCS — SIGNIFICANT CHANGE UP (ref 0–0)
NRBC # FLD: 0 K/UL — SIGNIFICANT CHANGE UP (ref 0–0)
PHOSPHATE SERPL-MCNC: 2.5 MG/DL — SIGNIFICANT CHANGE UP (ref 2.5–4.5)
PLATELET # BLD AUTO: 90 K/UL — LOW (ref 150–400)
POTASSIUM SERPL-MCNC: 3.1 MMOL/L — LOW (ref 3.5–5.3)
POTASSIUM SERPL-SCNC: 3.1 MMOL/L — LOW (ref 3.5–5.3)
RBC # BLD: 4.06 M/UL — SIGNIFICANT CHANGE UP (ref 3.8–5.2)
RBC # FLD: 14.3 % — SIGNIFICANT CHANGE UP (ref 10.3–14.5)
SODIUM SERPL-SCNC: 140 MMOL/L — SIGNIFICANT CHANGE UP (ref 135–145)
WBC # BLD: 10.48 K/UL — SIGNIFICANT CHANGE UP (ref 3.8–10.5)
WBC # FLD AUTO: 10.48 K/UL — SIGNIFICANT CHANGE UP (ref 3.8–10.5)

## 2022-09-07 PROCEDURE — 99233 SBSQ HOSP IP/OBS HIGH 50: CPT

## 2022-09-07 RX ORDER — ACETAMINOPHEN 500 MG
700 TABLET ORAL ONCE
Refills: 0 | Status: COMPLETED | OUTPATIENT
Start: 2022-09-07 | End: 2022-09-07

## 2022-09-07 RX ORDER — POTASSIUM CHLORIDE 20 MEQ
40 PACKET (EA) ORAL EVERY 4 HOURS
Refills: 0 | Status: COMPLETED | OUTPATIENT
Start: 2022-09-07 | End: 2022-09-07

## 2022-09-07 RX ORDER — MORPHINE SULFATE 50 MG/1
0.5 CAPSULE, EXTENDED RELEASE ORAL ONCE
Refills: 0 | Status: DISCONTINUED | OUTPATIENT
Start: 2022-09-07 | End: 2022-09-07

## 2022-09-07 RX ADMIN — Medication 100 MILLIGRAM(S): at 22:25

## 2022-09-07 RX ADMIN — Medication 40 MILLIEQUIVALENT(S): at 13:41

## 2022-09-07 RX ADMIN — MORPHINE SULFATE 0.5 MILLIGRAM(S): 50 CAPSULE, EXTENDED RELEASE ORAL at 13:16

## 2022-09-07 RX ADMIN — Medication 280 MILLIGRAM(S): at 01:18

## 2022-09-07 RX ADMIN — Medication 700 MILLIGRAM(S): at 02:15

## 2022-09-07 RX ADMIN — AMPICILLIN SODIUM AND SULBACTAM SODIUM 200 GRAM(S): 250; 125 INJECTION, POWDER, FOR SUSPENSION INTRAMUSCULAR; INTRAVENOUS at 08:01

## 2022-09-07 RX ADMIN — Medication 25 MILLIGRAM(S): at 13:43

## 2022-09-07 RX ADMIN — Medication 25 MILLIGRAM(S): at 21:16

## 2022-09-07 RX ADMIN — LISINOPRIL 5 MILLIGRAM(S): 2.5 TABLET ORAL at 13:38

## 2022-09-07 RX ADMIN — Medication 5 MILLIGRAM(S): at 22:27

## 2022-09-07 RX ADMIN — AMPICILLIN SODIUM AND SULBACTAM SODIUM 200 GRAM(S): 250; 125 INJECTION, POWDER, FOR SUSPENSION INTRAMUSCULAR; INTRAVENOUS at 17:42

## 2022-09-07 RX ADMIN — QUETIAPINE FUMARATE 12.5 MILLIGRAM(S): 200 TABLET, FILM COATED ORAL at 22:25

## 2022-09-07 RX ADMIN — Medication 40 MILLIEQUIVALENT(S): at 09:16

## 2022-09-07 RX ADMIN — MORPHINE SULFATE 0.5 MILLIGRAM(S): 50 CAPSULE, EXTENDED RELEASE ORAL at 13:31

## 2022-09-07 NOTE — PROGRESS NOTE ADULT - SUBJECTIVE AND OBJECTIVE BOX
[facility] Division of Hospital Medicine  Noel Tong MD  Pager (M-F, 8A-5P): [phone]  Other Times:  [phone]    Patient is a 89y old  Female who presents with a chief complaint of Tooth infection (07 Sep 2022 07:32)    SUBJECTIVE / OVERNIGHT EVENTS:  Patient has been refusing all PO intake (food and meds) since yesterday after the procedure.  Patient not answering any directed questions also.  No overnight issues with F/C, vomiting, SOB, Cough, diarrhea.    MEDICATIONS  (STANDING):  ampicillin/sulbactam  IVPB 3 Gram(s) IV Intermittent every 8 hours  bisacodyl 5 milliGRAM(s) Oral at bedtime  lisinopril 5 milliGRAM(s) Oral daily  metoprolol tartrate 25 milliGRAM(s) Oral every 8 hours  morphine  - Injectable 0.5 milliGRAM(s) IV Push once  polyethylene glycol 3350 17 Gram(s) Oral daily  potassium chloride   Powder 40 milliEquivalent(s) Oral every 4 hours  QUEtiapine 12.5 milliGRAM(s) Oral at bedtime  traZODone 100 milliGRAM(s) Oral at bedtime    MEDICATIONS  (PRN):  acetaminophen     Tablet .. 650 milliGRAM(s) Oral every 6 hours PRN Mild Pain (1 - 3), Moderate Pain (4 - 6)  haloperidol    Injectable 1 milliGRAM(s) IV Push every 6 hours PRN Agitation  oxyCODONE    IR 2.5 milliGRAM(s) Oral every 6 hours PRN Severe Pain (7 - 10)      Vital Signs Last 24 Hrs  T(C): 36.3 (07 Sep 2022 03:40), Max: 36.8 (06 Sep 2022 17:00)  T(F): 97.3 (07 Sep 2022 03:40), Max: 98.2 (06 Sep 2022 17:00)  HR: 77 (07 Sep 2022 05:45) (70 - 88)  BP: 141/79 (07 Sep 2022 05:45) (141/79 - 186/99)  BP(mean): 110 (06 Sep 2022 13:00) (90 - 110)  RR: 17 (07 Sep 2022 03:40) (14 - 20)  SpO2: 97% (07 Sep 2022 03:40) (97% - 100%)    Parameters below as of 07 Sep 2022 03:40  Patient On (Oxygen Delivery Method): room air      CAPILLARY BLOOD GLUCOSE        I&O's Summary      PHYSICAL EXAM:  CONSTITUTIONAL: NAD, cachectic  EYES: PERRLA; conjunctiva and sclera clear  ENMT: Moist oral mucosa, no pharyngeal injection or exudates; dried blood around the lips.  RESPIRATORY: Normal respiratory effort; lungs are clear to auscultation bilaterally  CARDIOVASCULAR: Regular rate and rhythm, normal S1 and S2, no murmur/rub/gallop  ABDOMEN: abd TTP in the suprapubic range, improved from day prior  PSYCH: A+O to person only  SKIN: No rashes; no palpable lesions    LABS:                        12.2   10.48 )-----------( 90       ( 07 Sep 2022 05:45 )             37.6     09-07    140  |  103  |  14  ----------------------------<  82  3.1<L>   |  23  |  0.70    Ca    8.6      07 Sep 2022 05:45  Phos  2.5     09-07  Mg     1.90     09-07                RADIOLOGY & ADDITIONAL TESTS:    Imaging Personally Reviewed:    Care Discussed with Consultants/Other Providers:

## 2022-09-07 NOTE — DIETITIAN INITIAL EVALUATION ADULT - NS FNS DIET ORDER
Diet, Minced and Moist:   Supplement Feeding Modality:  Oral  Ensure Enlive Cans or Servings Per Day:  3       Frequency:  Daily (09-07-22 @ 14:21)

## 2022-09-07 NOTE — DIETITIAN INITIAL EVALUATION ADULT - PERTINENT MEDS FT
MEDICATIONS  (STANDING):  ampicillin/sulbactam  IVPB 3 Gram(s) IV Intermittent every 8 hours  bisacodyl 5 milliGRAM(s) Oral at bedtime  lisinopril 5 milliGRAM(s) Oral daily  metoprolol tartrate 25 milliGRAM(s) Oral every 8 hours  polyethylene glycol 3350 17 Gram(s) Oral daily  QUEtiapine 12.5 milliGRAM(s) Oral at bedtime  traZODone 100 milliGRAM(s) Oral at bedtime    MEDICATIONS  (PRN):  acetaminophen     Tablet .. 650 milliGRAM(s) Oral every 6 hours PRN Mild Pain (1 - 3), Moderate Pain (4 - 6)  haloperidol    Injectable 1 milliGRAM(s) IV Push every 6 hours PRN Agitation  oxyCODONE    IR 2.5 milliGRAM(s) Oral every 6 hours PRN Severe Pain (7 - 10)

## 2022-09-07 NOTE — DIETITIAN INITIAL EVALUATION ADULT - ORAL INTAKE PTA/DIET HISTORY
Collateral obtained from daughter, PCA, and comprehensive chart review. PTA, patient with decreased appetite with advanced age. Patient would eat soft texture foods at home due to poor dentition. Per daughter is a very picky eater and will only eat food from specific people she knows.

## 2022-09-07 NOTE — PROGRESS NOTE ADULT - ASSESSMENT
89 y/o F w PMH of dementia, CAD s/p 3x stents in 2003 presented to [facility] ED with daughter complaining of pain on the lower right mandible for the last two weeks. Pt has multiple non-restorable, grossly carious teeth- #4, 5, 7, 8, 9, 10, 25, 31. Lower right molar tooth #31 currently symptomatic. No sign of airway compromise, no intraoral / extraoral swelling or signs of infection spread to any of the fascial spaces. Patient was scheduled to go to the OR for removal of carious non-restorable teeth on 9/2. Case was cancelled as patient was noted to be in a-fib w/ RVR. She was started on rate control medication and lovenox anticoagulation. Overnight 9/2 required IV metoprolol for rapid a-fib to 160's. A-fib is now controlled and patient is now POD#1 s/p extraction of tooth #'s 4, 5, 7, 8, 9, 10, 25 in the OR on 9/6    Recommendations   - Liquid diet for 24 hours post op  - Head of bed elevated 30 degrees  - Avoid suctioning of mouth, if noted to be bleeding or pooling blood, please page OMFS [phone]  - Avoid retraction of lips  - Pt to follow up one week after discharge in dental clinic            89 y/o F w PMH of dementia, CAD s/p 3x stents in 2003 presented to [facility] ED with daughter complaining of pain on the lower right mandible for the last two weeks. Pt has multiple non-restorable, grossly carious teeth- #4, 5, 7, 8, 9, 10, 25, 31. Lower right molar tooth #31 currently asymptomatic. No sign of airway compromise, no intraoral / extraoral swelling or signs of infection spread to any of the fascial spaces. During admission patient was found to have a-fib. She was started on metoprolol for rate control medication and lovenox anticoagulation.  A-fib is now controlled and patient is now POD#1 s/p extraction of tooth #'s 4, 5, 7, 8, 9, 10, 25 in the OR on 9/6.    Recommendations   - Patient can advance to a soft food diet  - Head of bed elevated 30 degrees  - Avoid suctioning of mouth, if noted to be bleeding or pooling blood, please page OMFS [phone]  - Avoid retraction of lips  - Pt to follow up one week after discharge in dental clinic       Plan:  - Peridex 15ml BID   - Augmentin 875mg BID   - Pain control per primary team  - Patient can advance to a soft food diet.  - Head of bed elevated 30 degrees  - Avoid suctioning of mouth, if noted to be bleeding or pooling blood, please page OMFS [phone]  - Avoid retraction of lips  - Patient should follow up as an outpatient at [facility] in one week.   - Please reconsult OMFS if you have any further questions or concerns.    [facility]   Address: [phone] [place] [address]  Phone: [phone]    Usama Simpson DDS   Oral and Maxillofacial Surgery   Pager #[phone]  Available on Microsoft Teams              89 y/o F w PMH of dementia, CAD s/p 3x stents in 2003 presented to [facility] ED with daughter complaining of pain on the lower right mandible for the last two weeks. Pt has multiple non-restorable, grossly carious teeth- #4, 5, 7, 8, 9, 10, 25, 31. Lower right molar tooth #31 currently asymptomatic. No sign of airway compromise, no intraoral / extraoral swelling or signs of infection spread to any of the fascial spaces. During admission patient was found to have a-fib. She was started on metoprolol for rate control medication and lovenox anticoagulation.  A-fib is now controlled and patient is POD#1 s/p extraction of tooth #'s 4, 5, 7, 8, 9, 10, 25 in the OR on 9/6.    Recommendations   - Patient can advance to a soft food diet  - Head of bed elevated 30 degrees  - Avoid suctioning of mouth, if noted to be bleeding or pooling blood, please page OMFS [phone]  - Avoid retraction of lips  - Pt to follow up one week after discharge in dental clinic       Plan:  - Peridex 15ml BID   - Augmentin 875mg BID   - Pain control per primary team  - Patient can advance to a soft food diet.  - Head of bed elevated 30 degrees  - Avoid suctioning of mouth, or sucking through a straw  - Avoid retraction of lips  - Patient should follow up as an outpatient at [facility] in one week.   - Please reconsult OMFS if you have any further questions or concerns.    [facility]   Address: [phone] [address]  Phone: [phone]    Usama Simpson DDS   Oral and Maxillofacial Surgery   Pager #[phone]  Available on [employer] Teams

## 2022-09-07 NOTE — DIETITIAN INITIAL EVALUATION ADULT - PERTINENT LABORATORY DATA
09-07 Na 140 mmol/L Glu 82 mg/dL K+ 3.1 mmol/L<L> Cr 0.70 mg/dL BUN 14 mg/dL Phos 2.5 mg/dL      Ca    8.6      07 Sep 2022 05:45  Phos  2.5     09-07  Mg     1.90     09-07

## 2022-09-07 NOTE — PROGRESS NOTE ADULT - SUBJECTIVE AND OBJECTIVE BOX
9/3 HD5  Interval Events: Patient was scheduled Friday to go to the OR for removal of carious non-restorable teeth. Case was cancelled as patient was noted to be in a-fib w/ RVR. She was started on rate control medication and lovenox anticoagulation. Overnight required IV metoprolol for rapid a-fib to 160's. Pending Cards work up and optimization for OR. Anticipate OR on Tuesday 09/06/2022 for extraction of carious non-restorable teeth #4, 5, 7, 8, 9, 10, 25, 31. Patient examined bedside. Pain continues to be well controlled, tolerating PO soft and bite sized diet,  AFVSS. Patient was more cooperative this am and allowed for examination for oral cavity, no signs of acute infection.    9/4 HD6:  patient visited yolanda MIR. AVSS. patient has been seen by cardiology with recs for hold anticoag 24hrs prior to OR, magnesium >2 and potassium >4. Patient is currently undergoing optimization by medicine for OR planned now on Tuesday 9/6.     9/5 HD7:  Patient visited yolanda MIR. Hypertensive this morning (179/89). Patient has been seen by cardiology with recs for hold anticoag 24hrs prior to OR, magnesium >2 and potassium >4. Patient is currently undergoing optimization by medicine for OR planned now on Tuesday 9/6. A-fib currently being managed with rate control and a/c.     9/6 HD8:  patient visited bedside. Patient had 2 instances of High blood pressure readings during the night (diastolic >100) and was placed on hydralazine. Patient resting comfortably now. vitals signs stable from hydralazine administration. anticoags have been held for 24 hours prior to OR. Patient is planned for OR today 9/6 for extraction of #4,5,7,8,9,10, 25, 31. patient is being optimized for OR by medical team.     9/7 HD9:  Patient seen and examined bedside this morning, no overnight events. Pt now s/p extraction of tooth #4, 5, 7, 8, 9, 10, 25, 31. Patient seen laying in bed in NAD      HPI: 89 y/o F w PMH of dementia, CAD s/p 3x stents in 2003 initially presented to [facility] ED with daughter complaining of pain on the lower right mandible. HPI collected from daughter. Per daughter, pt reports two week hx of dental pain on the lower right. Pt was seen by outside OMFS earlier this week (Dr Paresh BROWNING), who recommended extraction of teeth #4, 5, 7, 8, 9, 10, 25, 31 under general anesthesia. Pt was scheduled for procedure in October and dismissed on PO course of clindamycin. Per daughter, pt has been unable to take PO abx as it is giving her nausea and pt has been vomiting following administration for meds. Pt has also not been eating per daughter. On 09/02/22 patient was planned to be taken to the operating room for extraction but it was cancelled do to new onset atrial fibration.     PMH: Dementia, CAD, osteoporosis (never on bisphosphonates)  PSH: S/p stents x3 (2003)  Meds: Metoprolol, Trazadone  All: NKDA    PAST MEDICAL & SURGICAL HISTORY:  Dementia  CAD (coronary artery disease)  Osteoporosis    No significant past surgical history  Physical exam:  General: NAD, NC/AT  Extraoral: No swelling, no asymmetry, neck soft, ANDREY 40mm w/o guarding, TMJ FROM  Intraoral: Grossly carious retained roots at teeth #4, 5, 7, 8, 9, 10, 25, 31 exquisitely tender to palpation, no vestibular swelling, floor of mouth soft/not elevated. Uvula midline. No signs of acute infection.   Neuro: AAOx1  Rad: Unable to take dental panoramic radiograph secondary to patients mental status    Vital Signs Last 24 Hrs  Vital Signs Last 24 Hrs  T(C): 36.3 (07 Sep 2022 03:40), Max: 36.9 (06 Sep 2022 08:56)  T(F): 97.3 (07 Sep 2022 03:40), Max: 98.5 (06 Sep 2022 08:56)  HR: 77 (07 Sep 2022 05:45) (70 - 105)  BP: 141/79 (07 Sep 2022 05:45) (141/79 - 186/99)  BP(mean): 110 (06 Sep 2022 13:00) (90 - 110)  RR: 17 (07 Sep 2022 03:40) (14 - 20)  SpO2: 97% (07 Sep 2022 03:40) (97% - 100%)    Parameters below as of 07 Sep 2022 03:40  Patient On (Oxygen Delivery Method): room air        LABS:                                 13.5   10.58 )-----------( 119      ( 06 Sep 2022 06:05 )             42.2     CBC Full  -  ( 06 Sep 2022 06:05 )  WBC Count : 10.58 K/uL  RBC Count : 4.52 M/uL  Hemoglobin : 13.5 g/dL  Hematocrit : 42.2 %  Platelet Count - Automated : 119 K/uL  Mean Cell Volume : 93.4 fL  Mean Cell Hemoglobin : 29.9 pg  Mean Cell Hemoglobin Concentration : 32.0 gm/dL  Auto Neutrophil # : x  Auto Lymphocyte # : x  Auto Monocyte # : x  Auto Eosinophil # : x  Auto Basophil # : x  Auto Neutrophil % : x  Auto Lymphocyte % : x  Auto Monocyte % : x  Auto Eosinophil % : x  Auto Basophil % : x                                       9/3 HD5  Interval Events: Patient was scheduled Friday to go to the OR for removal of carious non-restorable teeth. Case was cancelled as patient was noted to be in a-fib w/ RVR. She was started on rate control medication and lovenox anticoagulation. Overnight required IV metoprolol for rapid a-fib to 160's. Pending Cards work up and optimization for OR. Anticipate OR on Tuesday 09/06/2022 for extraction of carious non-restorable teeth #4, 5, 7, 8, 9, 10, 25, 31. Patient examined bedside. Pain continues to be well controlled, tolerating PO soft and bite sized diet,  AFVSS. Patient was more cooperative this am and allowed for examination of the oral cavity, no signs of acute infection.    9/4 HD6:  patient visited yolanda MIR. AFVSS. patient has been seen by cardiology with recs for hold anticoag 24hrs prior to OR, magnesium >2 and potassium >4. Patient is currently undergoing optimization by medicine for OR planned now on Tuesday 9/6.     9/5 HD7:  Patient visited yolanda MIR. Hypertensive this morning (179/89). Patient has been seen by cardiology with recs for hold anticoag 24hrs prior to OR, magnesium >2 and potassium >4. Patient is currently undergoing optimization by medicine for OR planned now on Tuesday 9/6. A-fib currently being managed with rate control and a/c.     9/6 HD8:  patient visited bedside. Patient had 2 instances of High blood pressure readings during the night (diastolic >100) and was placed on hydralazine. Patient resting comfortably now. vitals signs stable from hydralazine administration. anticoags have been held for 24 hours prior to OR. Patient is planned for OR today 9/6 for extraction of #4,5,7,8,9,10, 25, 31. patient is being optimized for OR by medical team.     9/7 HD9:  Patient seen and examined bedside this morning, no overnight events. Pt now s/p extraction of tooth #4, 5, 7, 8, 9, 10, 25, 31. Patient seen laying in bed in NAD      HPI: 89 y/o F w PMH of dementia, CAD s/p 3x stents in 2003 initially presented to [facility] ED with daughter complaining of pain on the lower right mandible. HPI collected from daughter. Per the patients daughter, the patient reported a two week hx of dental pain on the lower right. Pt was seen by an outside OMFS earlier last week (Dr Paresh BROWNING), who recommended extraction of teeth #4, 5, 7, 8, 9, 10, 25, 31 under general anesthesia. Pt was scheduled for procedure in October and dismissed on PO course of clindamycin. Per daughter, pt has been unable to take PO abx as it is gave her nausea, she reported vomiting following PO abx prior to hospital admission. Pt has also had not been eating much prior to admission per daughter. On 09/02/22 patient was planned to be taken to the operating room for extraction but it was cancelled do to new onset atrial fibration. After rate consultation with cardiology rate control with metoprolol was recommended and the patients cardiac status has been relatively stable since. The patient was taken to the operating room for extraction of #4, 5, 7, 8, 9, 10, 25, 31 on 09/06/2022.    PMH: Dementia, CAD, osteoporosis (never on bisphosphonates)  PSH: S/p stents x3 (2003)  Meds: Metoprolol, Trazadone  All: NKDA    Physical exam:  General: NAD, NC/AT  Extraoral: No swelling, no asymmetry, neck soft, ANDREY 40mm w/o guarding, TMJ FROM  Intraoral: Healing tooth site #4, 5, 7, 8, 9, 10, 25, 31, sutures intact and hemostatic, no vestibular swelling, floor of mouth soft/not elevated. Uvula midline. No signs of acute infection.   Neuro: AAOx1  Rad: Unable to take dental panoramic radiograph secondary to patients mental status    Vital Signs Last 24 Hrs  T(C): 36.3 (07 Sep 2022 03:40), Max: 36.9 (06 Sep 2022 08:56)  T(F): 97.3 (07 Sep 2022 03:40), Max: 98.5 (06 Sep 2022 08:56)  HR: 77 (07 Sep 2022 05:45) (70 - 105)  BP: 141/79 (07 Sep 2022 05:45) (141/79 - 186/99)  BP(mean): 110 (06 Sep 2022 13:00) (90 - 110)  RR: 17 (07 Sep 2022 03:40) (14 - 20)  SpO2: 97% (07 Sep 2022 03:40) (97% - 100%)    Parameters below as of 07 Sep 2022 03:40  Patient On (Oxygen Delivery Method): room air        LABS:                                            12.2   10.48 )-----------( x        ( 07 Sep 2022 05:45 )             37.6   09-07    x   |  x   |  14  ----------------------------<  82  x    |  23  |  0.70    Ca    8.6      07 Sep 2022 05:45  Phos  2.0     09-06  Mg     1.90     09-07        CBC Full  -  ( 06 Sep 2022 06:05 )  WBC Count : 10.58 K/uL  RBC Count : 4.52 M/uL  Hemoglobin : 13.5 g/dL  Hematocrit : 42.2 %  Platelet Count - Automated : 119 K/uL  Mean Cell Volume : 93.4 fL  Mean Cell Hemoglobin : 29.9 pg  Mean Cell Hemoglobin Concentration : 32.0 gm/dL

## 2022-09-07 NOTE — DIETITIAN INITIAL EVALUATION ADULT - OTHER INFO
Medical course: Per chart 89F Dementia, CAD/Stent, HTN, p/w Rt lower teeth extraction by OMFS c/b urinary retention s/p dyer (8/31), 4.8cm AAA – no further w/u as per fam, New Afib w/ RVR.    Nutrition interview: No recent episodes of vomiting, diarrhea or constipation, last BM noted on 9/7 per RN flowsheets, currently on bowel regimen. Per chart reported patient gagging with no emesis, followed by large BM. Denies any chewing/swallowing difficulties, currently receiving minced and moist diet. No food allergies. Stated UBW: 100#. consistent with current weight. Food preferences explored and noted. Intake is 0-25% per RN flowsheets and per pts daughter. Pt NPO 9/6 for procedure (tooth extraction) Feeding skills: assistance. Per daughter, height is 5'0. Patient noted with severe muscle and fat loss likely due to age and poor p.o. intake.

## 2022-09-07 NOTE — DIETITIAN INITIAL EVALUATION ADULT - ORAL NUTRITION SUPPLEMENTS
Continue Ensure Enlive 3x daily. Patient will be proivded with in-house supplement Ensure Plus High Protein 1 PO Three Times Daily (1050 kcal, 60 gm protein).    department to provide Magic Cup 2x/day (580 kcal, 18 gm protein).

## 2022-09-07 NOTE — DIETITIAN INITIAL EVALUATION ADULT - ADD RECOMMEND
1) Continue current diet order  2)  department to provide Magic Cup 2x/day (580 kcal, 18 gm protein) to promote optimal PO intake   3) Encourage PO intake and honor food preferences as able.   4) RD available prn

## 2022-09-08 ENCOUNTER — TRANSCRIPTION ENCOUNTER (OUTPATIENT)
Age: 87
End: 2022-09-08

## 2022-09-08 LAB
ANION GAP SERPL CALC-SCNC: 16 MMOL/L — HIGH (ref 7–14)
BUN SERPL-MCNC: 11 MG/DL — SIGNIFICANT CHANGE UP (ref 7–23)
CALCIUM SERPL-MCNC: 8.9 MG/DL — SIGNIFICANT CHANGE UP (ref 8.4–10.5)
CHLORIDE SERPL-SCNC: 103 MMOL/L — SIGNIFICANT CHANGE UP (ref 98–107)
CO2 SERPL-SCNC: 25 MMOL/L — SIGNIFICANT CHANGE UP (ref 22–31)
CREAT SERPL-MCNC: 0.7 MG/DL — SIGNIFICANT CHANGE UP (ref 0.5–1.3)
EGFR: 79 ML/MIN/1.73M2 — SIGNIFICANT CHANGE UP
GLUCOSE SERPL-MCNC: 78 MG/DL — SIGNIFICANT CHANGE UP (ref 70–99)
HCT VFR BLD CALC: 36.8 % — SIGNIFICANT CHANGE UP (ref 34.5–45)
HGB BLD-MCNC: 11.9 G/DL — SIGNIFICANT CHANGE UP (ref 11.5–15.5)
MAGNESIUM SERPL-MCNC: 1.8 MG/DL — SIGNIFICANT CHANGE UP (ref 1.6–2.6)
MCHC RBC-ENTMCNC: 29.8 PG — SIGNIFICANT CHANGE UP (ref 27–34)
MCHC RBC-ENTMCNC: 32.3 GM/DL — SIGNIFICANT CHANGE UP (ref 32–36)
MCV RBC AUTO: 92.2 FL — SIGNIFICANT CHANGE UP (ref 80–100)
NRBC # BLD: 0 /100 WBCS — SIGNIFICANT CHANGE UP (ref 0–0)
NRBC # FLD: 0 K/UL — SIGNIFICANT CHANGE UP (ref 0–0)
PHOSPHATE SERPL-MCNC: 2.3 MG/DL — LOW (ref 2.5–4.5)
PLATELET # BLD AUTO: 104 K/UL — LOW (ref 150–400)
POTASSIUM SERPL-MCNC: 3.2 MMOL/L — LOW (ref 3.5–5.3)
POTASSIUM SERPL-SCNC: 3.2 MMOL/L — LOW (ref 3.5–5.3)
RBC # BLD: 3.99 M/UL — SIGNIFICANT CHANGE UP (ref 3.8–5.2)
RBC # FLD: 14.4 % — SIGNIFICANT CHANGE UP (ref 10.3–14.5)
SARS-COV-2 RNA SPEC QL NAA+PROBE: SIGNIFICANT CHANGE UP
SODIUM SERPL-SCNC: 144 MMOL/L — SIGNIFICANT CHANGE UP (ref 135–145)
WBC # BLD: 10.98 K/UL — HIGH (ref 3.8–10.5)
WBC # FLD AUTO: 10.98 K/UL — HIGH (ref 3.8–10.5)

## 2022-09-08 PROCEDURE — 99233 SBSQ HOSP IP/OBS HIGH 50: CPT

## 2022-09-08 RX ORDER — QUETIAPINE FUMARATE 200 MG/1
12.5 TABLET, FILM COATED ORAL
Qty: 0 | Refills: 0 | DISCHARGE
Start: 2022-09-08

## 2022-09-08 RX ORDER — POLYETHYLENE GLYCOL 3350 17 G/17G
17 POWDER, FOR SOLUTION ORAL
Qty: 0 | Refills: 0 | DISCHARGE
Start: 2022-09-08

## 2022-09-08 RX ORDER — METOPROLOL TARTRATE 50 MG
1 TABLET ORAL
Qty: 0 | Refills: 0 | DISCHARGE
Start: 2022-09-08

## 2022-09-08 RX ORDER — TRAZODONE HCL 50 MG
1 TABLET ORAL
Qty: 0 | Refills: 0 | DISCHARGE
Start: 2022-09-08

## 2022-09-08 RX ORDER — POTASSIUM CHLORIDE 20 MEQ
10 PACKET (EA) ORAL
Refills: 0 | Status: COMPLETED | OUTPATIENT
Start: 2022-09-08 | End: 2022-09-08

## 2022-09-08 RX ORDER — ACETAMINOPHEN 500 MG
2 TABLET ORAL
Qty: 0 | Refills: 0 | DISCHARGE
Start: 2022-09-08

## 2022-09-08 RX ORDER — LISINOPRIL 2.5 MG/1
1 TABLET ORAL
Qty: 0 | Refills: 0 | DISCHARGE
Start: 2022-09-08

## 2022-09-08 RX ORDER — OXYCODONE HYDROCHLORIDE 5 MG/1
2.5 TABLET ORAL
Qty: 0 | Refills: 0 | DISCHARGE
Start: 2022-09-08

## 2022-09-08 RX ADMIN — AMPICILLIN SODIUM AND SULBACTAM SODIUM 200 GRAM(S): 250; 125 INJECTION, POWDER, FOR SUSPENSION INTRAMUSCULAR; INTRAVENOUS at 01:50

## 2022-09-08 RX ADMIN — QUETIAPINE FUMARATE 12.5 MILLIGRAM(S): 200 TABLET, FILM COATED ORAL at 21:12

## 2022-09-08 RX ADMIN — Medication 25 MILLIGRAM(S): at 11:51

## 2022-09-08 RX ADMIN — Medication 5 MILLIGRAM(S): at 21:13

## 2022-09-08 RX ADMIN — AMPICILLIN SODIUM AND SULBACTAM SODIUM 200 GRAM(S): 250; 125 INJECTION, POWDER, FOR SUSPENSION INTRAMUSCULAR; INTRAVENOUS at 08:41

## 2022-09-08 RX ADMIN — Medication 100 MILLIGRAM(S): at 21:13

## 2022-09-08 RX ADMIN — Medication 100 MILLIEQUIVALENT(S): at 16:00

## 2022-09-08 RX ADMIN — Medication 100 MILLIEQUIVALENT(S): at 17:34

## 2022-09-08 RX ADMIN — AMPICILLIN SODIUM AND SULBACTAM SODIUM 200 GRAM(S): 250; 125 INJECTION, POWDER, FOR SUSPENSION INTRAMUSCULAR; INTRAVENOUS at 18:46

## 2022-09-08 RX ADMIN — Medication 100 MILLIEQUIVALENT(S): at 11:51

## 2022-09-08 RX ADMIN — Medication 25 MILLIGRAM(S): at 21:06

## 2022-09-08 NOTE — PHARMACOTHERAPY INTERVENTION NOTE - COMMENTS
New discharge medications reviewed with the patients daughter Frances. Outpatient medication schedule was discussed in detail including: medication name, indication, administration times, side effects, and special instructions. Frances expressed concern over patient compliance and difficulty with taking medication. Daughters questions and concerns were answered and addressed. Patients daughter demonstrated understanding. Informed her that medication list may change prior to discharge.    Isaura Barclay, PharmD, AAHIVP  Clinical Pharmacy Specialist  Spectra: [phone]

## 2022-09-08 NOTE — PROGRESS NOTE ADULT - SUBJECTIVE AND OBJECTIVE BOX
[facility] Division of Hospital Medicine  Noel Tong MD  Pager (M-F, 8A-5P): [phone]  Other Times:  [phone]    Patient is a 89y old  Female who presents with a chief complaint of Tooth infection (08 Sep 2022 11:15)    SUBJECTIVE / OVERNIGHT EVENTS:  Still with poor PO intake. No new issues overnight.  Limited history due dementia. No overnight issues with F/C, vomiting, SOB, Cough, diarrhea.    MEDICATIONS  (STANDING):  ampicillin/sulbactam  IVPB 3 Gram(s) IV Intermittent every 8 hours  bisacodyl 5 milliGRAM(s) Oral at bedtime  lisinopril 5 milliGRAM(s) Oral daily  metoprolol tartrate 25 milliGRAM(s) Oral every 8 hours  polyethylene glycol 3350 17 Gram(s) Oral daily  potassium chloride  10 mEq/100 mL IVPB 10 milliEquivalent(s) IV Intermittent every 1 hour  QUEtiapine 12.5 milliGRAM(s) Oral at bedtime  traZODone 100 milliGRAM(s) Oral at bedtime    MEDICATIONS  (PRN):  acetaminophen     Tablet .. 650 milliGRAM(s) Oral every 6 hours PRN Mild Pain (1 - 3), Moderate Pain (4 - 6)  oxyCODONE    IR 2.5 milliGRAM(s) Oral every 6 hours PRN Severe Pain (7 - 10)      Vital Signs Last 24 Hrs  T(C): 36.7 (08 Sep 2022 11:36), Max: 36.7 (08 Sep 2022 05:11)  T(F): 98.1 (08 Sep 2022 11:36), Max: 98.1 (08 Sep 2022 05:11)  HR: 72 (08 Sep 2022 11:36) (67 - 83)  BP: 174/85 (08 Sep 2022 11:36) (141/82 - 178/97)  BP(mean): --  RR: 17 (08 Sep 2022 11:36) (15 - 17)  SpO2: 98% (08 Sep 2022 11:36) (97% - 99%)    Parameters below as of 08 Sep 2022 11:36  Patient On (Oxygen Delivery Method): room air      CAPILLARY BLOOD GLUCOSE        I&O's Summary      PHYSICAL EXAM:  CONSTITUTIONAL: NAD, cachectic  EYES: PERRLA; conjunctiva and sclera clear  ENMT: Moist oral mucosa, no pharyngeal injection or exudates; dried blood around the lips.  RESPIRATORY: Normal respiratory effort; lungs are clear to auscultation bilaterally  CARDIOVASCULAR: Regular rate and rhythm, normal S1 and S2, no murmur/rub/gallop  ABDOMEN: abd TTP in the suprapubic range, improved from day prior  PSYCH: A+O to person only  SKIN: No rashes; no palpable lesions    LABS:                        11.9   10.98 )-----------( 104      ( 08 Sep 2022 06:50 )             36.8     09-08    144  |  103  |  11  ----------------------------<  78  3.2<L>   |  25  |  0.70    Ca    8.9      08 Sep 2022 06:50  Phos  2.3     09-08  Mg     1.80     09-08                RADIOLOGY & ADDITIONAL TESTS:    Imaging Personally Reviewed:    Care Discussed with Consultants/Other Providers:

## 2022-09-08 NOTE — DISCHARGE NOTE PROVIDER - HOSPITAL COURSE
89F Dementia, CAD/Stent, HTN, p/w Rt lower teeth extraction by OMFS c/b urinary retention s/p dyer (8/31), 4.8cm AAA – no further w/u as per fam, New Afib w/ RVR  Hospital Course   ·Tooth infection.   s/p extraction, s/p IV Unasyn, now transitioned to oral augmentin BID  - Peridex 15ml BID   - Patient can advance to a soft food diet.  - Head of bed elevated 30 degrees  - Avoid suctioning of mouth, or sucking through a straw  - Avoid retraction of lips  - Patient should follow up as an outpatient at [facility] in one week.     ·  Atrial fibrillation  C/W with Metoprolol  - TTE --> 9/2 moderate AR, severely dilated RA and LA, preserved EF  - GOC discussion about A/C - discussed with daughter Randi and went over the pros and cons - prefer not to be on A/C due to bleeding risk from fall/frailty and fact that she's already very difficult in terms of being compliant with PO meds at home.    ·  Dementia.   ·  Plan: AOx1, more anxious and declining over the last year per daughter. Will need placement after extraction.   -daughter reports pt is on trazodone 150mg qhs  - will add seroquel for qHS    ·  CAD (coronary artery disease).   ·  Plan: s/p stents in 2003. no chest pain reported. Per daughter, pt can ambulate well without any CP nor SOB. no use of asa or blood thinners at home  -on metoprolol succinate 25 mg daily-- changed to tartrate.    ·  Urinary retention.   · Monitor urinary output.  Case discussed with attending, Pt is stable for discharge Home       89F Dementia, CAD/Stent, HTN, p/w Rt lower teeth extraction by OMFS c/b urinary retention s/p dyer (8/31), 4.8cm AAA – no further w/u as per fam, New Afib w/ RVR  Hospital Course   ·Tooth infection.   s/p extraction, s/p IV Unasyn, now transitioned to oral augmentin BID  - Peridex 15ml BID   - Patient can advance to a soft food diet.  - Head of bed elevated 30 degrees  - Avoid suctioning of mouth, or sucking through a straw  - Avoid retraction of lips  - Patient should follow up as an outpatient at [facility] in one week.     ·  Atrial fibrillation  C/W with Metoprolol  - TTE --> 9/2 moderate AR, severely dilated RA and LA, preserved EF  - GOC discussion about A/C - discussed with daughter Randi and went over the pros and cons - prefer not to be on A/C due to bleeding risk from fall/frailty and fact that she's already very difficult in terms of being compliant with PO meds at home.    ·  Dementia.   ·  Plan: AOx1, more anxious and declining over the last year per daughter. Will need placement after extraction.   -daughter reports pt is on trazodone 150mg qhs  - will add seroquel for qHS    ·  CAD (coronary artery disease).   ·  Plan: s/p stents in 2003. no chest pain reported. Per daughter, pt can ambulate well without any CP nor SOB. no use of asa or blood thinners at home  -on metoprolol succinate 25 mg daily-- changed to tartrate.    ·  Urinary retention.   s/p Dyer, Now pt passed TOV  Case discussed with attending, Pt is stable for discharge Home               89F Dementia, CAD/Stent, HTN, p/w Rt lower teeth extraction by OMFS c/b urinary retention s/p dyer (8/31), 4.8cm AAA – no further w/u as per fam, New Afib w/ RVR     Nutritional Assessment:  · Nutritional Assessment	This patient has been assessed with a concern for Malnutrition and has been determined to have a diagnosis/diagnoses of Severe protein-calorie malnutrition.    This patient is being managed with:   Diet Minced and Moist-  Supplement Feeding Modality:  Oral  Ensure Enlive Cans or Servings Per Day:  3       Frequency:  Daily  Entered: Sep  7 2022  2:21PM     Problem/Plan - 1:  ·  Problem: Tooth infection.   ·  Plan: s/p extraction  - continue Unsayn IV for now - can transition to PO on discharge. Will clarify the duration of treatment.  - case d/w HCP Randi agreeable to no further labs given advanced age and lack of compliance would like antibiotics/meds offered but if refuses would not force or place alternate forms of administration.     Problem/Plan - 2:  ·  Problem: Atrial fibrillation.   ·  Plan: Family reports NO history of afib on am of 9/2 when discussing. Updated them that we will need to work this up further prior to OR.  She does not follow regularly w cardiology. Does have a hx of stents in 2003, not on asa/statin. Takes metop sux 25 mg daily. Currently does not appear to be in a HF exacerbation-->not dyspneic, no hypoxia   - AC (9/2- ), therapeutic lovenox 1mg/kg q 12 hrs; hold 24 hours prior to dental procedure, HOLD on 9/5 * R/B discussion w family for long term AC  - Resumed metop sux 25 mg daily PO--> transition to metop tartrate for tighter titration while hospitalized, f/u cards rec's --> incr to metop tartrate 25 mg TID, cautious about up titration given recurrent episodes of SVR on tele, D/w cards [ ]  - TTE --> 9/2 moderate AR, severely dilated RA and LA, preserved EF  - Goal mag of 2, goal K of 4  - F/u EKG, trop- peaked at 40s, pro BNP- 15k  - GOC discussion about A/C - discussed with daughter Randi and went over the pros and cons - prefer not to be on A/C due to bleeding risk from fall/frailty and fact that she's already very difficult. in terms of being compliant with PO meds at home.  - discontinue tele.     Problem/Plan - 3:  ·  Problem: Dementia.   ·  Plan: AOx1, more anxious and declining over the last year per daughter. Will need placement after extraction. will consult social work.  -daughter reports pt is on trazodone 150mg qhs  - will add seroquel for qHS  - currently not tolerating PO intake - will evaluate whether pain is potential source for her PO refusal.  However patient is very non-compliant with PO meds at home at baseline.  - no overt compliant of pain calm and redirectable as per staff.     Problem/Plan - 4:  ·  Problem: CAD (coronary artery disease).   ·  Plan: s/p stents in 2003. no chest pain reported. Per daughter, pt can ambulate well without any CP nor SOB. no use of asa or blood thinners at home  -on metoprolol succinate 25 mg daily-- changed to tartrate.     Problem/Plan - 5:  ·  Problem: Urinary retention.   ·  Resolved    Dispo: Stable for DC to Rehab 9/12.  d/W attending.

## 2022-09-08 NOTE — DISCHARGE NOTE PROVIDER - CARE PROVIDER_API CALL
Primary Care doctor,   Please Follow up with Your Primary Care Doctor in 1-2 week  Phone: (   )    -  Fax: (   )    -  Follow Up Time:

## 2022-09-08 NOTE — DISCHARGE NOTE PROVIDER - NSDCFUADDINST_GEN_ALL_CORE_FT
SOFT Diet   - Peridex 15ml BID  - Patient can advance to a soft food diet. - Head of bed elevated 30 degrees - Avoid suctioning of mouth, or sucking through a straw - Avoid retraction of lips - Patient should follow up as an outpatient at [facility] in one week.

## 2022-09-08 NOTE — DISCHARGE NOTE PROVIDER - NSDCMRMEDTOKEN_GEN_ALL_CORE_FT
clindamycin 150 mg oral capsule: 1 cap(s) orally every 8 hours (Dispensed on 8/26/22 x 7 day course).   metoprolol tartrate 25 mg oral tablet: Per [facility]: 1 tab(s) orally once a day (dispensed 7/22/22 x 90 days)  traZODone 100 mg oral tablet: Daughter reports: 150mg at bedtime   Per Pharmacy: 2 tab(s) orally once a day (at bedtime)   acetaminophen 325 mg oral tablet: 2 tab(s) orally every 6 hours, As needed, Mild Pain (1 - 3), Moderate Pain (4 - 6)  bisacodyl 5 mg oral delayed release tablet: 1 tab(s) orally once a day (at bedtime)  clindamycin 150 mg oral capsule: 1 cap(s) orally every 8 hours (Dispensed on 8/26/22 x 7 day course).   lisinopril 5 mg oral tablet: 1 tab(s) orally once a day  metoprolol tartrate 25 mg oral tablet: 1 tab(s) orally every 8 hours  oxyCODONE: 2.5 milligram(s) orally every 6 hours, As Needed for pain   polyethylene glycol 3350 oral powder for reconstitution: 17 gram(s) orally once a day  QUEtiapine: 12.5 milligram(s) orally once a day (at bedtime)  traZODone 100 mg oral tablet: 1 tab(s) orally once a day (at bedtime)   acetaminophen 325 mg oral tablet: 2 tab(s) orally every 6 hours, As needed, Mild Pain (1 - 3), Moderate Pain (4 - 6)  bisacodyl 5 mg oral delayed release tablet: 1 tab(s) orally once a day (at bedtime)  lisinopril 5 mg oral tablet: 1 tab(s) orally once a day  metoprolol tartrate 50 mg oral tablet: 1 tab(s) orally every 8 hours  oxyCODONE: 2.5 milligram(s) orally every 6 hours, As Needed for pain   polyethylene glycol 3350 oral powder for reconstitution: 17 gram(s) orally once a day  QUEtiapine: 12.5 milligram(s) orally once a day (at bedtime)  traZODone 100 mg oral tablet: 1 tab(s) orally once a day (at bedtime)

## 2022-09-08 NOTE — DISCHARGE NOTE PROVIDER - NSDCFUADDAPPT_GEN_ALL_CORE_FT
[facility]   Address: [address] [place] [address]  Phone: [phone] Please follow up with [facility] in 1 week  Address: [phone] [place] [address], [place] [address]  Phone: [phone]

## 2022-09-08 NOTE — DISCHARGE NOTE PROVIDER - NSDCCPCAREPLAN_GEN_ALL_CORE_FT
PRINCIPAL DISCHARGE DIAGNOSIS  Diagnosis: Tooth infection  Assessment and Plan of Treatment: You were Found to have anfected teeth, You underwent several extractions (Removal of teeth) You were treated with IV antibiotics, now transitioned to oral antibiotics   Please Continue with :   - Peridex 15ml BID   - Patient can advance to a soft food diet.  - Head of bed elevated 30 degrees  - Avoid suctioning of mouth, or sucking through a straw  - Avoid retraction of lips  - Patient should follow up as an outpatient at [facility] in one week.      SECONDARY DISCHARGE DIAGNOSES  Diagnosis: CAD (coronary artery disease)  Assessment and Plan of Treatment:     Diagnosis: Dementia  Assessment and Plan of Treatment:      PRINCIPAL DISCHARGE DIAGNOSIS  Diagnosis: Tooth infection  Assessment and Plan of Treatment: You were Found to have anfected teeth, You underwent several extractions (Removal of teeth) You were treated with IV antibiotics, now transitioned to oral antibiotics   Please Continue with :   - Peridex 15ml BID   - Patient can advance to a soft food diet.  - Head of bed elevated 30 degrees  - Avoid suctioning of mouth, or sucking through a straw  - Avoid retraction of lips  - Patient should follow up as an outpatient at [facility] in one week.      SECONDARY DISCHARGE DIAGNOSES  Diagnosis: CAD (coronary artery disease)  Assessment and Plan of Treatment: Continue with Your Current Medications, Follow up with Your PMD in 1 week    Diagnosis: Atrial fibrillation  Assessment and Plan of Treatment: C/W with Metoprolol  - TTE --> 9/2 moderate AR, severely dilated RA and LA, preserved EF  - GOC discussion about A/C - discussed with daughter Randi and went over the pros and cons - prefer not to be on A/C due to bleeding risk from fall/frailty and fact that she's already very difficult in terms of being compliant with PO meds at home.      Diagnosis: Dementia  Assessment and Plan of Treatment:      PRINCIPAL DISCHARGE DIAGNOSIS  Diagnosis: Tooth infection  Assessment and Plan of Treatment: You were Found to have anfected teeth, You underwent several extractions (Removal of teeth) You were treated with IV antibiotics, now transitioned to oral antibiotics   Please Continue with :   - Peridex 15ml BID   -Augmentin BID x 1 more day  - Patient can advance to a soft food diet.  - Head of bed elevated 30 degrees  - Avoid suctioning of mouth, or sucking through a straw  - Avoid retraction of lips  - Patient should follow up as an outpatient at [facility] in one week.      SECONDARY DISCHARGE DIAGNOSES  Diagnosis: Dementia  Assessment and Plan of Treatment:     Diagnosis: CAD (coronary artery disease)  Assessment and Plan of Treatment: Continue with Your Current Medications, Follow up with Your PMD in 1 week    Diagnosis: Atrial fibrillation  Assessment and Plan of Treatment: C/W with Metoprolol  - TTE --> 9/2 moderate AR, severely dilated RA and LA, preserved EF  - C discussion about A/C - discussed with daughter Randi and went over the pros and cons - prefer not to be on A/C due to bleeding risk from fall/frailty and fact that she's already very difficult in terms of being compliant with PO meds at home.

## 2022-09-08 NOTE — DISCHARGE NOTE PROVIDER - NSDCHC_MEDRECLITE_GEN_ALL_CORE
LACTATION NOTE - MOTHER      Evaluation Type: Inpatient    Problems identified  Problems identified: Knowledge deficit;Milk supply WNL; Nipple pain  Problems Identified Other: Nipples more everted after infant has fed.     Maternal history  Maternal history: Click to Modify Medication Indication on Note Save

## 2022-09-08 NOTE — DISCHARGE NOTE PROVIDER - DETAILS OF MALNUTRITION DIAGNOSIS/DIAGNOSES
This patient has been assessed with a concern for Malnutrition and was treated during this hospitalization for the following Nutrition diagnosis/diagnoses:     -  09/07/2022: Severe protein-calorie malnutrition

## 2022-09-08 NOTE — DISCHARGE NOTE PROVIDER - PROVIDER TOKENS
FREE:[LAST:[Primary Care doctor],PHONE:[(   )    -],FAX:[(   )    -],ADDRESS:[Please Follow up with Your Primary Care Doctor in 1-2 week]]

## 2022-09-09 LAB
ANION GAP SERPL CALC-SCNC: 12 MMOL/L — SIGNIFICANT CHANGE UP (ref 7–14)
BASOPHILS # BLD AUTO: 0.07 K/UL — SIGNIFICANT CHANGE UP (ref 0–0.2)
BASOPHILS NFR BLD AUTO: 0.6 % — SIGNIFICANT CHANGE UP (ref 0–2)
BUN SERPL-MCNC: 8 MG/DL — SIGNIFICANT CHANGE UP (ref 7–23)
CALCIUM SERPL-MCNC: 8.7 MG/DL — SIGNIFICANT CHANGE UP (ref 8.4–10.5)
CHLORIDE SERPL-SCNC: 102 MMOL/L — SIGNIFICANT CHANGE UP (ref 98–107)
CO2 SERPL-SCNC: 26 MMOL/L — SIGNIFICANT CHANGE UP (ref 22–31)
CREAT SERPL-MCNC: 0.62 MG/DL — SIGNIFICANT CHANGE UP (ref 0.5–1.3)
EGFR: 81 ML/MIN/1.73M2 — SIGNIFICANT CHANGE UP
EOSINOPHIL # BLD AUTO: 0.17 K/UL — SIGNIFICANT CHANGE UP (ref 0–0.5)
EOSINOPHIL NFR BLD AUTO: 1.5 % — SIGNIFICANT CHANGE UP (ref 0–6)
GLUCOSE SERPL-MCNC: 85 MG/DL — SIGNIFICANT CHANGE UP (ref 70–99)
HCT VFR BLD CALC: 37.1 % — SIGNIFICANT CHANGE UP (ref 34.5–45)
HGB BLD-MCNC: 12.1 G/DL — SIGNIFICANT CHANGE UP (ref 11.5–15.5)
IANC: 7.59 K/UL — HIGH (ref 1.8–7.4)
IMM GRANULOCYTES NFR BLD AUTO: 1 % — SIGNIFICANT CHANGE UP (ref 0–1.5)
LYMPHOCYTES # BLD AUTO: 1.81 K/UL — SIGNIFICANT CHANGE UP (ref 1–3.3)
LYMPHOCYTES # BLD AUTO: 16.4 % — SIGNIFICANT CHANGE UP (ref 13–44)
MAGNESIUM SERPL-MCNC: 1.9 MG/DL — SIGNIFICANT CHANGE UP (ref 1.6–2.6)
MCHC RBC-ENTMCNC: 29.8 PG — SIGNIFICANT CHANGE UP (ref 27–34)
MCHC RBC-ENTMCNC: 32.6 GM/DL — SIGNIFICANT CHANGE UP (ref 32–36)
MCV RBC AUTO: 91.4 FL — SIGNIFICANT CHANGE UP (ref 80–100)
MONOCYTES # BLD AUTO: 1.32 K/UL — HIGH (ref 0–0.9)
MONOCYTES NFR BLD AUTO: 11.9 % — SIGNIFICANT CHANGE UP (ref 2–14)
NEUTROPHILS # BLD AUTO: 7.59 K/UL — HIGH (ref 1.8–7.4)
NEUTROPHILS NFR BLD AUTO: 68.6 % — SIGNIFICANT CHANGE UP (ref 43–77)
NRBC # BLD: 0 /100 WBCS — SIGNIFICANT CHANGE UP (ref 0–0)
NRBC # FLD: 0 K/UL — SIGNIFICANT CHANGE UP (ref 0–0)
PHOSPHATE SERPL-MCNC: 1.5 MG/DL — LOW (ref 2.5–4.5)
PLATELET # BLD AUTO: 103 K/UL — LOW (ref 150–400)
POTASSIUM SERPL-MCNC: 3.7 MMOL/L — SIGNIFICANT CHANGE UP (ref 3.5–5.3)
POTASSIUM SERPL-SCNC: 3.7 MMOL/L — SIGNIFICANT CHANGE UP (ref 3.5–5.3)
RBC # BLD: 4.06 M/UL — SIGNIFICANT CHANGE UP (ref 3.8–5.2)
RBC # FLD: 14.4 % — SIGNIFICANT CHANGE UP (ref 10.3–14.5)
SODIUM SERPL-SCNC: 140 MMOL/L — SIGNIFICANT CHANGE UP (ref 135–145)
WBC # BLD: 11.07 K/UL — HIGH (ref 3.8–10.5)
WBC # FLD AUTO: 11.07 K/UL — HIGH (ref 3.8–10.5)

## 2022-09-09 PROCEDURE — 99232 SBSQ HOSP IP/OBS MODERATE 35: CPT

## 2022-09-09 RX ORDER — POTASSIUM PHOSPHATE, MONOBASIC POTASSIUM PHOSPHATE, DIBASIC 236; 224 MG/ML; MG/ML
30 INJECTION, SOLUTION INTRAVENOUS ONCE
Refills: 0 | Status: COMPLETED | OUTPATIENT
Start: 2022-09-09 | End: 2022-09-09

## 2022-09-09 RX ADMIN — Medication 25 MILLIGRAM(S): at 20:23

## 2022-09-09 RX ADMIN — LISINOPRIL 5 MILLIGRAM(S): 2.5 TABLET ORAL at 06:01

## 2022-09-09 RX ADMIN — AMPICILLIN SODIUM AND SULBACTAM SODIUM 200 GRAM(S): 250; 125 INJECTION, POWDER, FOR SUSPENSION INTRAMUSCULAR; INTRAVENOUS at 02:22

## 2022-09-09 RX ADMIN — POTASSIUM PHOSPHATE, MONOBASIC POTASSIUM PHOSPHATE, DIBASIC 83.33 MILLIMOLE(S): 236; 224 INJECTION, SOLUTION INTRAVENOUS at 12:30

## 2022-09-09 RX ADMIN — QUETIAPINE FUMARATE 12.5 MILLIGRAM(S): 200 TABLET, FILM COATED ORAL at 22:33

## 2022-09-09 RX ADMIN — AMPICILLIN SODIUM AND SULBACTAM SODIUM 200 GRAM(S): 250; 125 INJECTION, POWDER, FOR SUSPENSION INTRAMUSCULAR; INTRAVENOUS at 18:13

## 2022-09-09 RX ADMIN — AMPICILLIN SODIUM AND SULBACTAM SODIUM 200 GRAM(S): 250; 125 INJECTION, POWDER, FOR SUSPENSION INTRAMUSCULAR; INTRAVENOUS at 10:07

## 2022-09-09 RX ADMIN — Medication 25 MILLIGRAM(S): at 04:47

## 2022-09-09 RX ADMIN — Medication 25 MILLIGRAM(S): at 12:32

## 2022-09-09 NOTE — PROGRESS NOTE ADULT - SUBJECTIVE AND OBJECTIVE BOX
[facility] Division of Hospital Medicine  Noel Tong MD  Pager (M-F, 8A-5P): [phone]  Other Times:  [phone]    Patient is a 89y old  Female who presents with a chief complaint of Tooth infection (08 Sep 2022 14:53)    SUBJECTIVE / OVERNIGHT EVENTS:  Patient had no new issues overnight. History limited due to dementia. Poor PO intake. No overnight issues with F/C, vomiting, SOB, Cough, diarrhea.    MEDICATIONS  (STANDING):  ampicillin/sulbactam  IVPB 3 Gram(s) IV Intermittent every 8 hours  bisacodyl 5 milliGRAM(s) Oral at bedtime  lisinopril 5 milliGRAM(s) Oral daily  metoprolol tartrate 25 milliGRAM(s) Oral every 8 hours  polyethylene glycol 3350 17 Gram(s) Oral daily  QUEtiapine 12.5 milliGRAM(s) Oral at bedtime  traZODone 100 milliGRAM(s) Oral at bedtime    MEDICATIONS  (PRN):  acetaminophen     Tablet .. 650 milliGRAM(s) Oral every 6 hours PRN Mild Pain (1 - 3), Moderate Pain (4 - 6)  oxyCODONE    IR 2.5 milliGRAM(s) Oral every 6 hours PRN Severe Pain (7 - 10)      Vital Signs Last 24 Hrs  T(C): 36.6 (09 Sep 2022 11:30), Max: 36.8 (08 Sep 2022 21:13)  T(F): 97.8 (09 Sep 2022 11:30), Max: 98.3 (08 Sep 2022 21:13)  HR: 98 (09 Sep 2022 11:30) (75 - 99)  BP: 162/95 (09 Sep 2022 11:30) (152/73 - 181/85)  BP(mean): --  RR: 19 (09 Sep 2022 11:30) (18 - 20)  SpO2: 98% (09 Sep 2022 11:30) (97% - 100%)    Parameters below as of 09 Sep 2022 11:30  Patient On (Oxygen Delivery Method): room air      CAPILLARY BLOOD GLUCOSE        I&O's Summary      PHYSICAL EXAM:  CONSTITUTIONAL: NAD, cachectic  EYES: PERRLA; conjunctiva and sclera clear  ENMT: Moist oral mucosa, no pharyngeal injection or exudates; dried blood around the lips.  RESPIRATORY: Normal respiratory effort; lungs are clear to auscultation bilaterally  CARDIOVASCULAR: Regular rate and rhythm, normal S1 and S2, no murmur/rub/gallop  ABDOMEN: abd TTP in the suprapubic range, improved from day prior  PSYCH: A+O to person only  SKIN: No rashes; no palpable lesions    LABS:                        12.1   11.07 )-----------( 103      ( 09 Sep 2022 05:25 )             37.1     09-09    140  |  102  |  8   ----------------------------<  85  3.7   |  26  |  0.62    Ca    8.7      09 Sep 2022 05:25  Phos  1.5     09-09  Mg     1.90     09-09                RADIOLOGY & ADDITIONAL TESTS:    Imaging Personally Reviewed:    Care Discussed with Consultants/Other Providers:

## 2022-09-09 NOTE — PROGRESS NOTE ADULT - NSPROGADDITIONALINFOA_GEN_ALL_CORE
Discussed with daughter/HCP Randi on phone for 20 minutes on 9/7.  Answered all the questions.  Patient medically optimized for discharge.  Discharge planning 40 minutes - discussed with patient and consultants.
Patient medically optimized for discharge.  Discharge planning 40 minutes - discussed with patient's daughter and consultants.
Discussed with daughter/HCP Randi on phone for 20 minutes on 9/7.  Answered all the questions.

## 2022-09-10 LAB
ANION GAP SERPL CALC-SCNC: 14 MMOL/L — SIGNIFICANT CHANGE UP (ref 7–14)
BASOPHILS # BLD AUTO: 0.07 K/UL — SIGNIFICANT CHANGE UP (ref 0–0.2)
BASOPHILS NFR BLD AUTO: 0.8 % — SIGNIFICANT CHANGE UP (ref 0–2)
BUN SERPL-MCNC: 10 MG/DL — SIGNIFICANT CHANGE UP (ref 7–23)
CALCIUM SERPL-MCNC: 8.6 MG/DL — SIGNIFICANT CHANGE UP (ref 8.4–10.5)
CHLORIDE SERPL-SCNC: 104 MMOL/L — SIGNIFICANT CHANGE UP (ref 98–107)
CO2 SERPL-SCNC: 24 MMOL/L — SIGNIFICANT CHANGE UP (ref 22–31)
CREAT SERPL-MCNC: 0.67 MG/DL — SIGNIFICANT CHANGE UP (ref 0.5–1.3)
EGFR: 79 ML/MIN/1.73M2 — SIGNIFICANT CHANGE UP
EOSINOPHIL # BLD AUTO: 0.25 K/UL — SIGNIFICANT CHANGE UP (ref 0–0.5)
EOSINOPHIL NFR BLD AUTO: 2.9 % — SIGNIFICANT CHANGE UP (ref 0–6)
GLUCOSE SERPL-MCNC: 89 MG/DL — SIGNIFICANT CHANGE UP (ref 70–99)
HCT VFR BLD CALC: 36.7 % — SIGNIFICANT CHANGE UP (ref 34.5–45)
HGB BLD-MCNC: 11.9 G/DL — SIGNIFICANT CHANGE UP (ref 11.5–15.5)
IANC: 5.93 K/UL — SIGNIFICANT CHANGE UP (ref 1.8–7.4)
IMM GRANULOCYTES NFR BLD AUTO: 1.1 % — SIGNIFICANT CHANGE UP (ref 0–1.5)
LYMPHOCYTES # BLD AUTO: 1.18 K/UL — SIGNIFICANT CHANGE UP (ref 1–3.3)
LYMPHOCYTES # BLD AUTO: 13.9 % — SIGNIFICANT CHANGE UP (ref 13–44)
MAGNESIUM SERPL-MCNC: 1.8 MG/DL — SIGNIFICANT CHANGE UP (ref 1.6–2.6)
MCHC RBC-ENTMCNC: 29.7 PG — SIGNIFICANT CHANGE UP (ref 27–34)
MCHC RBC-ENTMCNC: 32.4 GM/DL — SIGNIFICANT CHANGE UP (ref 32–36)
MCV RBC AUTO: 91.5 FL — SIGNIFICANT CHANGE UP (ref 80–100)
MONOCYTES # BLD AUTO: 0.96 K/UL — HIGH (ref 0–0.9)
MONOCYTES NFR BLD AUTO: 11.3 % — SIGNIFICANT CHANGE UP (ref 2–14)
NEUTROPHILS # BLD AUTO: 5.93 K/UL — SIGNIFICANT CHANGE UP (ref 1.8–7.4)
NEUTROPHILS NFR BLD AUTO: 70 % — SIGNIFICANT CHANGE UP (ref 43–77)
NRBC # BLD: 0 /100 WBCS — SIGNIFICANT CHANGE UP (ref 0–0)
NRBC # FLD: 0 K/UL — SIGNIFICANT CHANGE UP (ref 0–0)
PHOSPHATE SERPL-MCNC: 3 MG/DL — SIGNIFICANT CHANGE UP (ref 2.5–4.5)
PLATELET # BLD AUTO: 104 K/UL — LOW (ref 150–400)
POTASSIUM SERPL-MCNC: 3.5 MMOL/L — SIGNIFICANT CHANGE UP (ref 3.5–5.3)
POTASSIUM SERPL-SCNC: 3.5 MMOL/L — SIGNIFICANT CHANGE UP (ref 3.5–5.3)
RBC # BLD: 4.01 M/UL — SIGNIFICANT CHANGE UP (ref 3.8–5.2)
RBC # FLD: 14.5 % — SIGNIFICANT CHANGE UP (ref 10.3–14.5)
SODIUM SERPL-SCNC: 142 MMOL/L — SIGNIFICANT CHANGE UP (ref 135–145)
WBC # BLD: 8.48 K/UL — SIGNIFICANT CHANGE UP (ref 3.8–10.5)
WBC # FLD AUTO: 8.48 K/UL — SIGNIFICANT CHANGE UP (ref 3.8–10.5)

## 2022-09-10 PROCEDURE — 99233 SBSQ HOSP IP/OBS HIGH 50: CPT

## 2022-09-10 RX ORDER — POTASSIUM CHLORIDE 20 MEQ
10 PACKET (EA) ORAL
Refills: 0 | Status: COMPLETED | OUTPATIENT
Start: 2022-09-10 | End: 2022-09-10

## 2022-09-10 RX ADMIN — Medication 25 MILLIGRAM(S): at 22:47

## 2022-09-10 RX ADMIN — Medication 100 MILLIEQUIVALENT(S): at 20:33

## 2022-09-10 RX ADMIN — Medication 100 MILLIEQUIVALENT(S): at 18:46

## 2022-09-10 RX ADMIN — LISINOPRIL 5 MILLIGRAM(S): 2.5 TABLET ORAL at 05:57

## 2022-09-10 RX ADMIN — AMPICILLIN SODIUM AND SULBACTAM SODIUM 200 GRAM(S): 250; 125 INJECTION, POWDER, FOR SUSPENSION INTRAMUSCULAR; INTRAVENOUS at 01:33

## 2022-09-10 RX ADMIN — AMPICILLIN SODIUM AND SULBACTAM SODIUM 200 GRAM(S): 250; 125 INJECTION, POWDER, FOR SUSPENSION INTRAMUSCULAR; INTRAVENOUS at 09:37

## 2022-09-10 RX ADMIN — Medication 25 MILLIGRAM(S): at 04:08

## 2022-09-10 RX ADMIN — Medication 100 MILLIGRAM(S): at 01:41

## 2022-09-10 RX ADMIN — Medication 100 MILLIEQUIVALENT(S): at 19:00

## 2022-09-10 RX ADMIN — Medication 5 MILLIGRAM(S): at 22:47

## 2022-09-10 RX ADMIN — AMPICILLIN SODIUM AND SULBACTAM SODIUM 200 GRAM(S): 250; 125 INJECTION, POWDER, FOR SUSPENSION INTRAMUSCULAR; INTRAVENOUS at 17:16

## 2022-09-10 RX ADMIN — Medication 100 MILLIGRAM(S): at 22:48

## 2022-09-10 RX ADMIN — QUETIAPINE FUMARATE 12.5 MILLIGRAM(S): 200 TABLET, FILM COATED ORAL at 22:47

## 2022-09-10 RX ADMIN — Medication 25 MILLIGRAM(S): at 13:57

## 2022-09-10 NOTE — PROGRESS NOTE ADULT - SUBJECTIVE AND OBJECTIVE BOX
[facility] Division of Hospital Medicine  Noel Tong MD  Pager (M-F, 8A-5P): [phone]  Other Times:  [phone]    Patient is a 89y old  Female who presents with a chief complaint of Tooth infection (09 Sep 2022 13:41)    SUBJECTIVE / OVERNIGHT EVENTS:  Patient had no new issues overnight. History limited due to dementia. Poor PO intake. No overnight issues with F/C, vomiting, SOB, Cough, diarrhea.    MEDICATIONS  (STANDING):  ampicillin/sulbactam  IVPB 3 Gram(s) IV Intermittent every 8 hours  bisacodyl 5 milliGRAM(s) Oral at bedtime  lisinopril 5 milliGRAM(s) Oral daily  metoprolol tartrate 25 milliGRAM(s) Oral every 8 hours  polyethylene glycol 3350 17 Gram(s) Oral daily  potassium chloride  10 mEq/100 mL IVPB 10 milliEquivalent(s) IV Intermittent every 1 hour  QUEtiapine 12.5 milliGRAM(s) Oral at bedtime  traZODone 100 milliGRAM(s) Oral at bedtime    MEDICATIONS  (PRN):  acetaminophen     Tablet .. 650 milliGRAM(s) Oral every 6 hours PRN Mild Pain (1 - 3), Moderate Pain (4 - 6)  oxyCODONE    IR 2.5 milliGRAM(s) Oral every 6 hours PRN Severe Pain (7 - 10)      Vital Signs Last 24 Hrs  T(C): 36.8 (10 Sep 2022 04:00), Max: 36.8 (10 Sep 2022 04:00)  T(F): 98.3 (10 Sep 2022 04:00), Max: 98.3 (10 Sep 2022 04:00)  HR: 92 (10 Sep 2022 04:00) (90 - 96)  BP: 166/80 (10 Sep 2022 04:00) (166/80 - 172/82)  BP(mean): --  RR: 18 (10 Sep 2022 04:00) (18 - 18)  SpO2: 98% (10 Sep 2022 04:00) (98% - 98%)    Parameters below as of 10 Sep 2022 04:00  Patient On (Oxygen Delivery Method): room air      CAPILLARY BLOOD GLUCOSE        I&O's Summary      PHYSICAL EXAM:  CONSTITUTIONAL: NAD, cachectic  EYES: PERRLA; conjunctiva and sclera clear  ENMT: Moist oral mucosa, no pharyngeal injection or exudates; dried blood around the lips.  RESPIRATORY: Normal respiratory effort; lungs are clear to auscultation bilaterally  CARDIOVASCULAR: Regular rate and rhythm, normal S1 and S2, no murmur/rub/gallop  ABDOMEN: abd TTP in the suprapubic range, improved from day prior  PSYCH: A+O to person only  SKIN: No rashes; no palpable lesions    LABS:                        11.9   8.48  )-----------( 104      ( 10 Sep 2022 06:49 )             36.7     09-10    142  |  104  |  10  ----------------------------<  89  3.5   |  24  |  0.67    Ca    8.6      10 Sep 2022 06:49  Phos  3.0     09-10  Mg     1.80     09-10                RADIOLOGY & ADDITIONAL TESTS:    Imaging Personally Reviewed:    Care Discussed with Consultants/Other Providers:

## 2022-09-11 LAB
SARS-COV-2 RNA SPEC QL NAA+PROBE: SIGNIFICANT CHANGE UP

## 2022-09-11 PROCEDURE — 99232 SBSQ HOSP IP/OBS MODERATE 35: CPT

## 2022-09-11 RX ORDER — METOPROLOL TARTRATE 50 MG
50 TABLET ORAL EVERY 8 HOURS
Refills: 0 | Status: DISCONTINUED | OUTPATIENT
Start: 2022-09-11 | End: 2022-09-12

## 2022-09-11 RX ADMIN — AMPICILLIN SODIUM AND SULBACTAM SODIUM 200 GRAM(S): 250; 125 INJECTION, POWDER, FOR SUSPENSION INTRAMUSCULAR; INTRAVENOUS at 01:34

## 2022-09-11 RX ADMIN — AMPICILLIN SODIUM AND SULBACTAM SODIUM 200 GRAM(S): 250; 125 INJECTION, POWDER, FOR SUSPENSION INTRAMUSCULAR; INTRAVENOUS at 17:52

## 2022-09-11 RX ADMIN — Medication 25 MILLIGRAM(S): at 04:54

## 2022-09-11 RX ADMIN — AMPICILLIN SODIUM AND SULBACTAM SODIUM 200 GRAM(S): 250; 125 INJECTION, POWDER, FOR SUSPENSION INTRAMUSCULAR; INTRAVENOUS at 08:34

## 2022-09-11 RX ADMIN — Medication 50 MILLIGRAM(S): at 13:17

## 2022-09-11 RX ADMIN — LISINOPRIL 5 MILLIGRAM(S): 2.5 TABLET ORAL at 05:19

## 2022-09-11 NOTE — PROGRESS NOTE ADULT - SUBJECTIVE AND OBJECTIVE BOX
[facility] Division of Hospital Medicine  Noel Tong MD  Pager (M-F, 8A-5P): [phone]  Other Times:  [phone]    Patient is a 89y old  Female who presents with a chief complaint of Tooth infection (10 Sep 2022 15:09)    SUBJECTIVE / OVERNIGHT EVENTS:  Patient had no new issues overnight. History limited due to dementia. Poor PO intake. No overnight issues with F/C, vomiting, SOB, Cough, diarrhea.    MEDICATIONS  (STANDING):  ampicillin/sulbactam  IVPB 3 Gram(s) IV Intermittent every 8 hours  bisacodyl 5 milliGRAM(s) Oral at bedtime  lisinopril 5 milliGRAM(s) Oral daily  metoprolol tartrate 50 milliGRAM(s) Oral every 8 hours  polyethylene glycol 3350 17 Gram(s) Oral daily  QUEtiapine 12.5 milliGRAM(s) Oral at bedtime  traZODone 100 milliGRAM(s) Oral at bedtime    MEDICATIONS  (PRN):  acetaminophen     Tablet .. 650 milliGRAM(s) Oral every 6 hours PRN Mild Pain (1 - 3), Moderate Pain (4 - 6)      Vital Signs Last 24 Hrs  T(C): 36.5 (11 Sep 2022 10:35), Max: 37.2 (11 Sep 2022 04:13)  T(F): 97.7 (11 Sep 2022 10:35), Max: 98.9 (11 Sep 2022 04:13)  HR: 87 (11 Sep 2022 10:35) (87 - 98)  BP: 180/84 (11 Sep 2022 10:35) (168/89 - 180/84)  BP(mean): --  RR: 16 (11 Sep 2022 10:35) (16 - 18)  SpO2: 98% (11 Sep 2022 10:35) (98% - 100%)    Parameters below as of 11 Sep 2022 10:35  Patient On (Oxygen Delivery Method): room air      CAPILLARY BLOOD GLUCOSE        I&O's Summary      PHYSICAL EXAM:  CONSTITUTIONAL: NAD, cachectic  EYES: PERRLA; conjunctiva and sclera clear  ENMT: Moist oral mucosa, no pharyngeal injection or exudates; dried blood around the lips.  RESPIRATORY: Normal respiratory effort; lungs are clear to auscultation bilaterally  CARDIOVASCULAR: Regular rate and rhythm, normal S1 and S2, no murmur/rub/gallop  ABDOMEN: abd TTP in the suprapubic range, improved from day prior  PSYCH: A+O to person only  SKIN: No rashes; no palpable lesions    LABS:                        11.9   8.48  )-----------( 104      ( 10 Sep 2022 06:49 )             36.7     09-10    142  |  104  |  10  ----------------------------<  89  3.5   |  24  |  0.67    Ca    8.6      10 Sep 2022 06:49  Phos  3.0     09-10  Mg     1.80     09-10                RADIOLOGY & ADDITIONAL TESTS:    Imaging Personally Reviewed:    Care Discussed with Consultants/Other Providers:

## 2022-09-12 ENCOUNTER — FORM ENCOUNTER (OUTPATIENT)
Age: 87
End: 2022-09-12

## 2022-09-12 ENCOUNTER — TRANSCRIPTION ENCOUNTER (OUTPATIENT)
Age: 87
End: 2022-09-12

## 2022-09-12 VITALS
TEMPERATURE: 98 F | RESPIRATION RATE: 17 BRPM | SYSTOLIC BLOOD PRESSURE: 172 MMHG | DIASTOLIC BLOOD PRESSURE: 73 MMHG | OXYGEN SATURATION: 96 % | HEART RATE: 90 BPM

## 2022-09-12 PROCEDURE — 99232 SBSQ HOSP IP/OBS MODERATE 35: CPT

## 2022-09-12 RX ORDER — METOPROLOL TARTRATE 50 MG
1 TABLET ORAL
Qty: 0 | Refills: 0 | DISCHARGE
Start: 2022-09-12

## 2022-09-12 RX ORDER — CHLORHEXIDINE GLUCONATE 213 G/1000ML
15 SOLUTION TOPICAL
Qty: 150 | Refills: 0
Start: 2022-09-12 | End: 2022-09-16

## 2022-09-12 RX ADMIN — AMPICILLIN SODIUM AND SULBACTAM SODIUM 200 GRAM(S): 250; 125 INJECTION, POWDER, FOR SUSPENSION INTRAMUSCULAR; INTRAVENOUS at 08:50

## 2022-09-12 RX ADMIN — AMPICILLIN SODIUM AND SULBACTAM SODIUM 200 GRAM(S): 250; 125 INJECTION, POWDER, FOR SUSPENSION INTRAMUSCULAR; INTRAVENOUS at 18:26

## 2022-09-12 RX ADMIN — AMPICILLIN SODIUM AND SULBACTAM SODIUM 200 GRAM(S): 250; 125 INJECTION, POWDER, FOR SUSPENSION INTRAMUSCULAR; INTRAVENOUS at 01:29

## 2022-09-12 NOTE — PROVIDER CONTACT NOTE (OTHER) - ACTION/TREATMENT ORDERED:
hydralazine ordered. reassess in 30min
lopressor ordered. recheck BP in 30minutes
provider made aware. no action/treatment ordered at this time
Provider made aware
provider on floor to assess. no action/ treatment ordered at this time
Provider made aware
Provider made aware
No action/treatment ordered at this time. provider will be at the bedside to assess shortly. pt no longer c/o abdominal pain
Provider made aware
Provider made aware. Provider stated she would order IVF and ensure patients.
Retake BP in 2 hours. If BP is still elevated contact provider so IV lopressor can be ordered. It is OK for other meds to not be administrated at this time
Pt NPO at this time and not taking AM meds. No IV BP meds will be administered at this time
Monitor VS. Ordered IV metoprolol , Tylenol and stat labs.
Provider made aware
Provider made aware
Provider made aware. Provider stated she would order IVF and ensure patients.
pt not tolerating PO meds at this time. Hydralazine IV ordered and administered

## 2022-09-12 NOTE — PROGRESS NOTE ADULT - PROBLEM SELECTOR PLAN 1
s/p extraction  - continue Unsayn IV for now - can transition to PO but patient not tolerating PO at this time.
s/p extraction  - continue Unsayn IV for now - can transition to PO but patient not tolerating PO at this time.  - will F/U w/ OMFS about duration of abx tx.  - give IV morphine x1 to see if this will ease her into PO intake.
ongoing for almost 2 weeks. Causing difficulty with eating at home due to tooth/mandible pain. Needs extraction under anesthesia given dementia/anxiety  -afebrile with no leukocytosis on arrival  -continue with unasyn per OMFS. plan for extraction this Thurs
s/p extraction  - continue Unsayn IV for now - can transition to PO but patient not tolerating PO at this time.
ongoing for almost 2 weeks. Causing difficulty with eating at home due to tooth/mandible pain. Needs extraction under anesthesia given dementia/anxiety  -afebrile with no leukocytosis on arrival  -continue with unasyn per OMFS. plan for extraction this Thurs  -light fluids given poor po intake
s/p extraction  - continue Unsayn IV for now - can transition to PO on discharge. Will clarify the duration of treatment.
ongoing for almost 2 weeks. Causing difficulty with eating at home due to tooth/mandible pain. Needs extraction under anesthesia given dementia/anxiety  -afebrile with no leukocytosis on arrival  -continue with unasyn per OMFS. plan for extraction this Thurs
ongoing for almost 2 weeks. Causing difficulty with eating at home due to tooth/mandible pain. Needs extraction under anesthesia given dementia/anxiety  -afebrile with no leukocytosis on arrival  -continue with unasyn per OMFS. plan for extraction this Thurs  -light fluids given poor po intake
s/p extraction  - continue Unsayn IV for now - can transition to PO on discharge.
ongoing for almost 2 weeks. Causing difficulty with eating at home due to tooth/mandible pain. Needs extraction under anesthesia given dementia/anxiety  -afebrile with no leukocytosis on arrival  -continue with unasyn per OMFS. plan for extraction this Thurs  -light fluids given poor po intake
s/p extraction  - continue Unsayn IV for now - can transition to PO on discharge. Will clarify the duration of treatment.  - case d/w HCP Randi agreeable to no further labs given advanced age and lack of compliance would like antibiotics/meds offered but if refuses would not force or place alternate forms of administration.
s/p extraction  - continue Unsayn IV for now.  - will F/U w/ OMFS about duration of abx tx.
ongoing for almost 2 weeks. Causing difficulty with eating at home due to tooth/mandible pain. Needs extraction under anesthesia given dementia/anxiety  -afebrile with no leukocytosis on arrival  -continue with unasyn per OMFS. plan for extraction this Thurs

## 2022-09-12 NOTE — PROGRESS NOTE ADULT - PROBLEM SELECTOR PLAN 3
AOx1, more anxious and declining over the last year per daughter. Will need placement after extraction. will consult social work  -will give haldol 1mg IVP for now given anxiety and slightly agitationC.  -daughter reports pt is on trazodone 150mg qhs    # Acute urinary retention, 8/31, impvoed  - q 8 bladder scans w PVR  - UA reassuring  - stool count  - renal US as above  - TOV 9/2 evening
AOx1, more anxious and declining over the last year per daughter. Will need placement after extraction. will consult social work.  -daughter reports pt is on trazodone 150mg qhs  - will add seroquel for qHS  - currently not tolerating PO intake - will evaluate whether pain is potential source for her PO refusal.  However patient is very non-compliant with PO meds at home at baseline.
AOx1, more anxious and declining over the last year per daughter. Will need placement after extraction. will consult social work  -will give haldol 1mg IVP for now given anxiety and slightly agitationC.  -daughter reports pt is on trazodone 150mg qhs    # Acute urinary retention, 8/31, impvoed  - q 8 bladder scans w PVR  - UA reassuring  - stool count  - renal US as above  - TOV 9/2 evening
AOx1, more anxious and declining over the last year per daughter. Will need placement after extraction. will consult social work  -will give haldol 1mg IVP for now given anxiety and slightly agitation  -check UA. [ ] check EKG for QTC.  -daughter reports pt is on trazodone 150mg qhs    # Acute urinary retention, 8/31  - q 8 bladder scans w PVR  - UA  - stool count  - renal US
AOx1, more anxious and declining over the last year per daughter. Will need placement after extraction. will consult social work.  -daughter reports pt is on trazodone 150mg qhs  - will add seroquel for qHS  - currently not tolerating PO intake - will evaluate whether pain is potential source for her PO refusal.  However patient is very non-compliant with PO meds at home at baseline.
AOx1, more anxious and declining over the last year per daughter. Will need placement after extraction. will consult social work  -will give haldol 1mg IVP for now given anxiety and slightly agitation  -check UA. [ ] check EKG for QTC.  -daughter reports pt is on trazodone 150mg qhs    # Acute urinary retention, 8/31  - q 8 bladder scans w PVR  - UA  - stool count  - renal US
AOx1, more anxious and declining over the last year per daughter. Will need placement after extraction. will consult social work  -will give haldol 1mg IVP for now given anxiety and slightly agitationC.  -daughter reports pt is on trazodone 150mg qhs    # Acute urinary retention, 8/31  - q 8 bladder scans w PVR  - UA reassuring  - stool count  - renal US as above  - TOV 9/2 evening
AOx1, more anxious and declining over the last year per daughter. Will need placement after extraction. will consult social work  -will give haldol 1mg IVP for now given anxiety and slightly agitationC.  -daughter reports pt is on trazodone 150mg qhs    # Acute urinary retention, 8/31, impvoed  - q 8 bladder scans w PVR  - UA reassuring  - stool count  - renal US as above  - TOV 9/2 evening
AOx1, more anxious and declining over the last year per daughter. Will need placement after extraction. will consult social work  -will give haldol 1mg IVP for now given anxiety and slightly agitationC.  -daughter reports pt is on trazodone 150mg qhs  - will add seroquel for qHS
AOx1, more anxious and declining over the last year per daughter. Will need placement after extraction. will consult social work.  -daughter reports pt is on trazodone 150mg qhs  - will add seroquel for qHS  - currently not tolerating PO intake - will evaluate whether pain is potential source for her PO refusal.  However patient is very non-compliant with PO meds at home at baseline.
AOx1, more anxious and declining over the last year per daughter. Will need placement after extraction. will consult social work.  -daughter reports pt is on trazodone 150mg qhs  - will add seroquel for qHS  - currently not tolerating PO intake - will evaluate whether pain is potential source for her PO refusal.  However patient is very non-compliant with PO meds at home at baseline.
AOx1, more anxious and declining over the last year per daughter. Will need placement after extraction. will consult social work.  -daughter reports pt is on trazodone 150mg qhs  - will add seroquel for qHS  - currently not tolerating PO intake - will evaluate whether pain is potential source for her PO refusal.  However patient is very non-compliant with PO meds at home at baseline.  - no overt compliant of pain calm and redirectable as per staff
AOx1, more anxious and declining over the last year per daughter. Will need placement after extraction. will consult social work.  -daughter reports pt is on trazodone 150mg qhs  - will add seroquel for qHS  - currently not tolerating PO intake - will evaluate whether pain is potential source for her PO refusal.  However patient is very non-compliant with PO meds at home at baseline.

## 2022-09-12 NOTE — DISCHARGE NOTE NURSING/CASE MANAGEMENT/SOCIAL WORK - PATIENT PORTAL LINK FT
You can access the FollowMyHealth Patient Portal offered by [facility] by registering at the following website: [email or web address]. By joining [facility]’s FollowMyHealth portal, you will also be able to view your health information using other applications (apps) compatible with our system.

## 2022-09-12 NOTE — DISCHARGE NOTE NURSING/CASE MANAGEMENT/SOCIAL WORK - NSDCFUADDAPPT_GEN_ALL_CORE_FT
Please follow up with [facility] in 1 week  Address: [phone] [place] [address], [place] [address]  Phone: [phone]

## 2022-09-12 NOTE — PROGRESS NOTE ADULT - PROBLEM SELECTOR PLAN 5
Monitor urinary output.

## 2022-09-12 NOTE — PROGRESS NOTE ADULT - PROBLEM SELECTOR PLAN 2
s/p stents in 2003. no chest pain reported. Per daughter, pt can ambulate well without any CP nor SOB. no use of asa or blood thinners at home  -on metoprolol sux 25 mg daily-- changed to tartrate  -check EKG
Family reports NO history of afib on am of 9/2 when discussing. Updated them that we will need to work this up further prior to OR.  She does not follow regularly w cardiology. Does have a hx of stents in 2003, not on asa/statin. Takes metop sux 25 mg daily. Currently does not appear to be in a HF exacerbation-->not dyspneic, no hypoxia   - AC (9/2- ), therapeutic lovenox 1mg/kg q 12 hrs; hold 24 hours prior to dental procedure, HOLD on 9/5 * R/B discussion w family for long term AC  - Resumed metop sux 25 mg daily PO--> transition to metop tartrate for tighter titration while hospitalized, f/u cards rec's --> incr to metop tartrate 25 mg TID, cautious about up titration given recurrent episodes of SVR on tele, D/w cards [ ]  - TTE --> 9/2 moderate AR, severely dilated RA and LA, preserved EF  - Goal mag of 2, goal K of 4  - F/u EKG, trop- peaked at 40s, pro BNP- 15k  - [facility] discussion about A/C - discussed with daughter Randi and went over the pros and cons - prefer not to be on A/C due to bleeding risk from fall/frailty and fact that she's already very difficult. in terms of being compliant with PO meds at home.  - discontinue tele
s/p stents in 2003. no chest pain reported. Per daughter, pt can ambulate well without any CP nor SOB. no use of asa or blood thinners at home  -on metoprolol at home but unclear of dose, will email [facility] pharmacist  -check EKG
s/p stents in 2003. no chest pain reported. Per daughter, pt can ambulate well without any CP nor SOB. no use of asa or blood thinners at home  -on metoprolol sux 25 mg daily-- changed to tartrate  -check EKG
Family reports NO history of afib on am of 9/2 when discussing. Updated them that we will need to work this up further prior to OR.  She does not follow regularly w cardiology. Does have a hx of stents in 2003, not on asa/statin. Takes metop sux 25 mg daily. Currently does not appear to be in a HF exacerbation-->not dyspneic, no hypoxia   - AC (9/2- ), therapeutic lovenox 1mg/kg q 12 hrs; hold 24 hours prior to dental procedure, HOLD on 9/5 * R/B discussion w family for long term AC  - Resumed metop sux 25 mg daily PO--> transition to metop tartrate for tighter titration while hospitalized, f/u cards rec's --> incr to metop tartrate 25 mg TID, cautious about up titration given recurrent episodes of SVR on tele, D/w cards [ ]  - TTE --> 9/2 moderate AR, severely dilated RA and LA, preserved EF  - Cont tele  - Goal mag of 2, goal K of 4  - F/u EKG, trop- peaked at 40s, pro BNP- 15k  - [facility] discussion about A/C - discussed with daughter Randi and went over the pros and cons - prefer not to be on A/C due to bleeding risk from fall/frailty and fact that she's already very difficult in terms of being compliant with PO meds at home.
s/p stents in 2003. no chest pain reported. Per daughter, pt can ambulate well without any CP nor SOB. no use of asa or blood thinners at home  -on metoprolol sux 25 mg daily-- changed to tartrate  -check EKG
Family reports NO history of afib on am of 9/2 when discussing. Updated them that we will need to work this up further prior to OR.  She does not follow regularly w cardiology. Does have a hx of stents in 2003, not on asa/statin. Takes metop sux 25 mg daily. Currently does not appear to be in a HF exacerbation-->not dyspneic, no hypoxia   - AC (9/2- ), therapeutic lovenox 1mg/kg q 12 hrs; hold 24 hours prior to dental procedure, HOLD on 9/5 * R/B discussion w family for long term AC  - Resumed metop sux 25 mg daily PO--> transition to metop tartrate for tighter titration while hospitalized, f/u cards rec's --> incr to metop tartrate 25 mg TID, cautious about up titration given recurrent episodes of SVR on tele, D/w cards [ ]  - TTE --> 9/2 moderate AR, severely dilated RA and LA, preserved EF  - Cont tele  - Goal mag of 2, goal K of 4  - F/u EKG, trop- peaked at 40s, pro BNP- 15k
Family reports NO history of afib on am of 9/2 when discussing. Updated them that we will need to work this up further prior to OR.  She does not follow regularly w cardiology. Does have a hx of stents in 2003, not on asa/statin. Takes metop sux 25 mg daily. Currently does not appear to be in a HF exacerbation-->not dyspneic, no hypoxia   - AC (9/2- ), therapeutic lovenox 1mg/kg q 12 hrs; hold 24 hours prior to dental procedure, HOLD on 9/5 * R/B discussion w family for long term AC  - Resumed metop sux 25 mg daily PO--> transition to metop tartrate for tighter titration while hospitalized, f/u cards rec's --> incr to metop tartrate 25 mg TID, cautious about up titration given recurrent episodes of SVR on tele, D/w cards [ ]  - TTE --> 9/2 moderate AR, severely dilated RA and LA, preserved EF  - Goal mag of 2, goal K of 4  - F/u EKG, trop- peaked at 40s, pro BNP- 15k  - [facility] discussion about A/C - discussed with daughter Randi and went over the pros and cons - prefer not to be on A/C due to bleeding risk from fall/frailty and fact that she's already very difficult in terms of being compliant with PO meds at home.
s/p stents in 2003. no chest pain reported. Per daughter, pt can ambulate well without any CP nor SOB. no use of asa or blood thinners at home  -on metoprolol sux 25 mg daily  -check EKG
s/p stents in 2003. no chest pain reported. Per daughter, pt can ambulate well without any CP nor SOB. no use of asa or blood thinners at home  -on metoprolol at home but unclear of dose, will email [facility] pharmacist  -check EKG
Family reports NO history of afib on am of 9/2 when discussing. Updated them that we will need to work this up further prior to OR.  She does not follow regularly w cardiology. Does have a hx of stents in 2003, not on asa/statin. Takes metop sux 25 mg daily. Currently does not appear to be in a HF exacerbation-->not dyspneic, no hypoxia   - AC (9/2- ), therapeutic lovenox 1mg/kg q 12 hrs; hold 24 hours prior to dental procedure, HOLD on 9/5 * R/B discussion w family for long term AC  - Resumed metop sux 25 mg daily PO--> transition to metop tartrate for tighter titration while hospitalized, f/u cards rec's --> incr to metop tartrate 25 mg TID, cautious about up titration given recurrent episodes of SVR on tele, D/w cards [ ]  - TTE --> 9/2 moderate AR, severely dilated RA and LA, preserved EF  - Goal mag of 2, goal K of 4  - F/u EKG, trop- peaked at 40s, pro BNP- 15k  - [facility] discussion about A/C - discussed with daughter Randi and went over the pros and cons - prefer not to be on A/C due to bleeding risk from fall/frailty and fact that she's already very difficult in terms of being compliant with PO meds at home.
Family reports NO history of afib on am of 9/2 when discussing. Updated them that we will need to work this up further prior to OR.  She does not follow regularly w cardiology. Does have a hx of stents in 2003, not on asa/statin. Takes metop sux 25 mg daily. Currently does not appear to be in a HF exacerbation-->not dyspneic, no hypoxia   - AC (9/2- ), therapeutic lovenox 1mg/kg q 12 hrs; hold 24 hours prior to dental procedure, HOLD on 9/5 * R/B discussion w family for long term AC  - Resumed metop sux 25 mg daily PO--> transition to metop tartrate for tighter titration while hospitalized, f/u cards rec's --> incr to metop tartrate 25 mg TID, cautious about up titration given recurrent episodes of SVR on tele, D/w cards [ ]  - TTE --> 9/2 moderate AR, severely dilated RA and LA, preserved EF  - Cont tele  - Goal mag of 2, goal K of 4  - F/u EKG, trop- peaked at 40s, pro BNP- 15k  - [facility] discussion about A/C - discussed with daughter Randi and went over the pros and cons - prefer not to be on A/C due to bleeding risk from fall/frailty and fact that she's already very difficult in terms of being compliant with PO meds at home.
Family reports NO history of afib on am of 9/2 when discussing. Updated them that we will need to work this up further prior to OR.  She does not follow regularly w cardiology. Does have a hx of stents in 2003, not on asa/statin. Takes metop sux 25 mg daily. Currently does not appear to be in a HF exacerbation-->not dyspneic, no hypoxia   - AC (9/2- ), therapeutic lovenox 1mg/kg q 12 hrs; hold 24 hours prior to dental procedure, HOLD on 9/5 * R/B discussion w family for long term AC  - Resumed metop sux 25 mg daily PO--> transition to metop tartrate for tighter titration while hospitalized, f/u cards rec's --> incr to metop tartrate 25 mg TID, cautious about up titration given recurrent episodes of SVR on tele, D/w cards [ ]  - TTE --> 9/2 moderate AR, severely dilated RA and LA, preserved EF  - Cont tele  - Goal mag of 2, goal K of 4  - F/u EKG, trop- peaked at 40s, pro BNP- 15k  - [facility] discussion about A/C - discussed with daughter Randi and went over the pros and cons - prefer not to be on A/C due to bleeding risk from fall/frailty and fact that she's already very difficult in terms of being compliant with PO meds at home.

## 2022-09-12 NOTE — PROVIDER CONTACT NOTE (OTHER) - ASSESSMENT
Repeat BP done. /89
patient vitals 180/97. metoprolol and Lisinopril given. patient took both medications
pt is gagging repeatedly with no emesis present. after c/o abdominal pain, pt had a large BM
A&Ox1. Vitals as per flowsheet. Poor oral intake as per family at bedside
pt has a toothache and is refusing any PO intake. Meds were crushed and put in liquid for easier consumption and pt continued to refuse and guard her mouth
patient vitals 178/97
pt asymptomatic at this time. no s/s of pain or discomfort.  pt has a hx of afib
pt asymptomatic at this time. no s/s of pain or discomfort. all other v/s within desired ranges at this time
/89. HR sustained at 132. Pt pointing to head, c/o of headache. noted crying at the bedside.
pt has a toothache and is refusing any PO intake. Meds were crushed and put in liquid for easier consumption and pt continued to refuse
pt agitated at this time due to confusion. kept arm constricted while blood pressure was being taken. no s/s of pain or discomfort. all other v/s within desired ranges at this time
patient /84, P 87. Patient asymptomatic
pt asymptomatic at this time. no s/s of pain or discomfort. all other v/s within desired ranges at this time
A&Ox1. Vitals as per flowsheet. Ordered STAT order for lopressor. Has no MOLST form in chart.
Patient blood pressure 170/92. patient is refusing metoprolol. Patient spit out medication, unable to make patient swallow meds
pt asymptomatic at this time. no s/s of pain or discomfort. all other v/s within desired ranges at this time
Patient blood pressure 170/92

## 2022-09-12 NOTE — PROVIDER CONTACT NOTE (OTHER) - BACKGROUND
pt admitted for disorder of teeth and supporting structures
Pt admitted with dementia. CAD.
pt admitted for disorder of teeth and supporting structures
89F admitted with other specified disorders of the teeth and supporting structures
pt admitted for disorder of teeth and supporting structures
pt admitted for disorder of teeth and supporting structures
89F admitted with other specified disorders of the teeth and supporting structures
pt admitted for disorder of teeth and supporting structures
89F admitted with other specified disorders of the teeth and supporting structures
Admitted for teeth extraction. No pertinent PMH.
89F admitted with other specified disorders of the teeth and supporting structures
Admitted for teeth extraction. No pertinent PMH.
pt admitted for disorder of teeth and supporting structures

## 2022-09-12 NOTE — PROGRESS NOTE ADULT - PROBLEM SELECTOR PROBLEM 4
CAD (coronary artery disease)
Need for prophylactic measure
CAD (coronary artery disease)
Need for prophylactic measure
CAD (coronary artery disease)

## 2022-09-12 NOTE — PROGRESS NOTE ADULT - PROBLEM SELECTOR PLAN 4
s/p stents in 2003. no chest pain reported. Per daughter, pt can ambulate well without any CP nor SOB. no use of asa or blood thinners at home  -on metoprolol succinate 25 mg daily-- changed to tartrate
therapeutic lovenox, soft diet per OMFS
s/p stents in 2003. no chest pain reported. Per daughter, pt can ambulate well without any CP nor SOB. no use of asa or blood thinners at home  -on metoprolol succinate 25 mg daily-- changed to tartrate
s/p stents in 2003. no chest pain reported. Per daughter, pt can ambulate well without any CP nor SOB. no use of asa or blood thinners at home  -on metoprolol succinate 25 mg daily-- changed to tartrate
heparin for dvt ppx, soft diet per OMFS
heparin for dvt ppx, soft diet per OMFS
s/p stents in 2003. no chest pain reported. Per daughter, pt can ambulate well without any CP nor SOB. no use of asa or blood thinners at home  -on metoprolol succinate 25 mg daily-- changed to tartrate
therapeutic lovenox, soft diet per OMFS
therapeutic lovenox, soft diet per OMFS
heparin for dvt ppx, soft diet per OMFS
s/p stents in 2003. no chest pain reported. Per daughter, pt can ambulate well without any CP nor SOB. no use of asa or blood thinners at home  -on metoprolol succinate 25 mg daily-- changed to tartrate

## 2022-09-12 NOTE — PROGRESS NOTE ADULT - PROBLEM SELECTOR PLAN 6
Lovenox SC for now, soft diet per OMFS. GOC done comfort measures.
Lovenox SC for now, soft diet per OMFS
therapeutic lovenox, soft diet per OMFS
Lovenox SC for now, soft diet per OMFS

## 2022-09-12 NOTE — PROVIDER CONTACT NOTE (OTHER) - RECOMMENDATIONS
Provider made aware. Provider stated she would order IVF and ensure patients.
Provider made aware. Provider stated she would order IVF and ensure patients.
Abril Hernandez ACP notified
Abril Hernandez ACP notified
Provider made aware
Yossi Fuentes ACP made aware
Abril Hernandez ACP notified
Abril Hernandez ACP notified
Provider made aware
Yossi Fuentes ACP made aware
provider made aware
Provider made aware
Yossi Fuentes
Monitor VS. BP medication
Provider made aware
Yossi Fuentes  notified
Provider made aware

## 2022-09-12 NOTE — PROVIDER CONTACT NOTE (OTHER) - DATE AND TIME:
12-Sep-2022 12:30
12-Sep-2022 13:30
06-Sep-2022 00:30
06-Sep-2022 06:00
07-Sep-2022 18:45
02-Sep-2022 16:50
02-Sep-2022 18:00
06-Sep-2022 01:30
07-Sep-2022 01:50
06-Sep-2022 23:00
07-Sep-2022 03:20
07-Sep-2022 06:00
07-Sep-2022 14:25
11-Sep-2022 10:45
02-Sep-2022 21:44
05-Sep-2022 22:25
11-Sep-2022 18:20

## 2022-09-12 NOTE — PROGRESS NOTE ADULT - REASON FOR ADMISSION
Tooth infection

## 2022-09-12 NOTE — PROVIDER CONTACT NOTE (OTHER) - NAME OF MD/NP/PA/DO NOTIFIED:
Anirudh White
Anirudh White
Abril Hernandez ACP
Abril Hernandez ACP
Pedro Carmen- [id number]
Greer Cohen ACP
Kristen White
Yossi Fuentes ACP
Cinda Gonzalez
Greer Cohen
Yossi Fuentes
Yossi Fuentes
Abril Hernandez ACP
Anirudh White
Abril Hernandez ACP
Cinda Gonzalez
Yossi Fuentes ACP

## 2022-09-12 NOTE — PROGRESS NOTE ADULT - PROVIDER SPECIALTY LIST ADULT
Hospitalist
OMFS
OMFS
Cardiology
OMFS
Hospitalist
OMFS
OMFS
Hospitalist

## 2022-09-12 NOTE — PROGRESS NOTE ADULT - NUTRITIONAL ASSESSMENT
This patient has been assessed with a concern for Malnutrition and has been determined to have a diagnosis/diagnoses of Severe protein-calorie malnutrition.    This patient is being managed with:   Diet Minced and Moist-  Supplement Feeding Modality:  Oral  Ensure Enlive Cans or Servings Per Day:  3       Frequency:  Daily  Entered: Sep  7 2022  2:21PM    

## 2022-09-12 NOTE — PROGRESS NOTE ADULT - SUBJECTIVE AND OBJECTIVE BOX
[facility] Division of Hospital Medicine  Rome Tripathi MD  Pager (M-F, 8A-5P): [phone]  Other Times:  [phone]    Patient is a 89y old  Female who presents with a chief complaint of Tooth infection (11 Sep 2022 13:52)      SUBJECTIVE / OVERNIGHT EVENTS: pt in NAD.   ADDITIONAL REVIEW OF SYSTEMS: baseline dementia unable to obtain denies any overt complaints     MEDICATIONS  (STANDING):  ampicillin/sulbactam  IVPB 3 Gram(s) IV Intermittent every 8 hours  bisacodyl 5 milliGRAM(s) Oral at bedtime  lisinopril 5 milliGRAM(s) Oral daily  metoprolol tartrate 50 milliGRAM(s) Oral every 8 hours  polyethylene glycol 3350 17 Gram(s) Oral daily  QUEtiapine 12.5 milliGRAM(s) Oral at bedtime  traZODone 100 milliGRAM(s) Oral at bedtime    MEDICATIONS  (PRN):  acetaminophen     Tablet .. 650 milliGRAM(s) Oral every 6 hours PRN Mild Pain (1 - 3), Moderate Pain (4 - 6)      CAPILLARY BLOOD GLUCOSE        I&O's Summary      PHYSICAL EXAM:  Vital Signs Last 24 Hrs  T(C): 36.8 (12 Sep 2022 06:00), Max: 36.8 (12 Sep 2022 06:00)  T(F): 98.3 (12 Sep 2022 06:00), Max: 98.3 (12 Sep 2022 06:00)  HR: 100 (12 Sep 2022 06:00) (83 - 100)  BP: 175/88 (12 Sep 2022 06:00) (151/77 - 179/89)  BP(mean): --  RR: 18 (12 Sep 2022 06:00) (16 - 18)  SpO2: 100% (12 Sep 2022 06:00) (99% - 100%)    Parameters below as of 11 Sep 2022 22:50  Patient On (Oxygen Delivery Method): room air      CONSTITUTIONAL: NAD, well-developed, well-groomed  EYES: PERRLA; conjunctiva and sclera clear  ENMT: Moist oral mucosa, no pharyngeal injection or exudates; normal dentition  NECK: Supple, no palpable masses; no thyromegaly  RESPIRATORY: Normal respiratory effort; lungs are clear to auscultation bilaterally  CARDIOVASCULAR: s1/s2 irregularly; irregular  ABDOMEN: Nontender to palpation, normoactive bowel sounds,   PSYCH: A+O x1 self   NEUROLOGY: moving all ext   Skin: multiple ecchymosis       LABS:                      RADIOLOGY & ADDITIONAL TESTS:  Results Reviewed:   Imaging Personally Reviewed:  Electrocardiogram Personally Reviewed:    COORDINATION OF CARE:  Care Discussed with Consultants/Other Providers [Y/N]:  Prior or Outpatient Records Reviewed [Y/N]:

## 2022-09-12 NOTE — PROGRESS NOTE ADULT - PROBLEM SELECTOR PROBLEM 1
Tooth infection

## 2022-09-12 NOTE — PROVIDER CONTACT NOTE (OTHER) - SITUATION
/89. HR sustained at 132. Pt pointing to head, c/o of headache. noted crying at the bedside.
patient /84, P 87. Patient asymptomatic
patient vitals 178/97
patient rapid afib to 160s, nonsustaining. Currently sustaining in 110s. Family at bedside states patient is DNR/DNI.
Pt had a manual BP of 166/99 and a HR of 75
Repeat BP done. /89
Patient blood pressure 170/92. patient is refusing metoprolol. Patient spit out medication, unable to make patient swallow meds
Patient blood pressure 170/92
Pt had a manual BP of 186/99 and a HR of 82
Pt had a manual BP of 192/102 and a HR of 74
Pt had a manual BP of 188/96 and a HR of 80
patient's family at bedside requesting IVF & ensure for patients
2.07 second pause on the cardiac monitor
patient vitals 180/97. metoprolol and Lisinopril given. patient took both medications
Pt refusing to take PO meds at this time
Pt refusing to take PO meds at this time
pt c/o abdominal pain

## 2022-09-12 NOTE — PROGRESS NOTE ADULT - PROBLEM SELECTOR PROBLEM 2
Atrial fibrillation
Atrial fibrillation
CAD (coronary artery disease)
Atrial fibrillation
Atrial fibrillation
CAD (coronary artery disease)
Atrial fibrillation
CAD (coronary artery disease)
Atrial fibrillation
Atrial fibrillation

## 2022-09-12 NOTE — DISCHARGE NOTE NURSING/CASE MANAGEMENT/SOCIAL WORK - NSDCPEFALRISK_GEN_ALL_CORE
For information on Fall & Injury Prevention, visit: [email or web address] OR  [email or web address] OR  https://www.cdc.gov/steadi/patient.html

## 2022-09-13 ENCOUNTER — NON-APPOINTMENT (OUTPATIENT)
Age: 87
End: 2022-09-13

## 2022-09-14 ENCOUNTER — APPOINTMENT (OUTPATIENT)
Dept: HOME HEALTH SERVICES | Facility: HOME HEALTH | Age: 87
End: 2022-09-14

## 2022-09-14 ENCOUNTER — NON-APPOINTMENT (OUTPATIENT)
Age: 87
End: 2022-09-14

## 2022-09-14 RX ORDER — CLINDAMYCIN HYDROCHLORIDE 150 MG/1
150 CAPSULE ORAL
Qty: 21 | Refills: 0 | Status: COMPLETED | COMMUNITY
Start: 2022-08-26 | End: 2022-09-14

## 2022-09-14 RX ORDER — ACETAMINOPHEN 500 MG/1
500 TABLET, COATED ORAL
Qty: 100 | Refills: 0 | Status: ACTIVE | COMMUNITY
Start: 2019-10-16

## 2022-09-14 RX ORDER — NYSTATIN 100000 1/G
100000 POWDER TOPICAL
Qty: 2 | Refills: 1 | Status: ACTIVE | COMMUNITY
Start: 2020-10-16

## 2022-09-14 RX ORDER — POLYVINYL ALCOHOL 14 MG/ML
1.4 SOLUTION/ DROPS OPHTHALMIC 4 TIMES DAILY
Qty: 1 | Refills: 5 | Status: ACTIVE | COMMUNITY
Start: 2020-10-16

## 2022-09-14 RX ORDER — FLUTICASONE PROPIONATE 50 UG/1
50 SPRAY, METERED NASAL TWICE DAILY
Qty: 48 | Refills: 3 | Status: ACTIVE | COMMUNITY
Start: 2019-10-16 | End: 1900-01-01

## 2022-09-15 ENCOUNTER — APPOINTMENT (OUTPATIENT)
Dept: HOME HEALTH SERVICES | Facility: HOME HEALTH | Age: 87
End: 2022-09-15

## 2022-09-15 VITALS
RESPIRATION RATE: 16 BRPM | DIASTOLIC BLOOD PRESSURE: 80 MMHG | HEART RATE: 103 BPM | TEMPERATURE: 97.8 F | OXYGEN SATURATION: 99 % | SYSTOLIC BLOOD PRESSURE: 140 MMHG

## 2022-09-15 DIAGNOSIS — F02.81 ALZHEIMER'S DISEASE, UNSPECIFIED: ICD-10-CM

## 2022-09-15 DIAGNOSIS — I48.91 UNSPECIFIED ATRIAL FIBRILLATION: ICD-10-CM

## 2022-09-15 DIAGNOSIS — G30.9 ALZHEIMER'S DISEASE, UNSPECIFIED: ICD-10-CM

## 2022-09-15 DIAGNOSIS — I50.20 UNSPECIFIED SYSTOLIC (CONGESTIVE) HEART FAILURE: ICD-10-CM

## 2022-09-15 DIAGNOSIS — F33.1 MAJOR DEPRESSIVE DISORDER, RECURRENT, MODERATE: ICD-10-CM

## 2022-09-15 PROCEDURE — 99350 HOME/RES VST EST HIGH MDM 60: CPT

## 2022-09-15 RX ORDER — QUETIAPINE FUMARATE 25 MG/1
25 TABLET ORAL DAILY
Qty: 90 | Refills: 0 | Status: DISCONTINUED | COMMUNITY
Start: 2022-09-14 | End: 2022-09-15

## 2022-09-15 RX ORDER — ACETAMINOPHEN 500 MG/1
500 TABLET ORAL EVERY 6 HOURS
Qty: 240 | Refills: 3 | Status: DISCONTINUED | COMMUNITY
Start: 2021-12-17 | End: 2022-09-15

## 2022-09-15 RX ORDER — CHLORHEXIDINE GLUCONATE 1.2 MG/ML
0.12 RINSE ORAL
Refills: 0 | Status: DISCONTINUED | COMMUNITY
Start: 2022-09-14 | End: 2022-09-15

## 2022-09-15 RX ORDER — OXYCODONE 5 MG/1
5 TABLET ORAL
Qty: 7 | Refills: 0 | Status: DISCONTINUED | COMMUNITY
Start: 2022-09-14 | End: 2022-09-15

## 2022-09-15 RX ORDER — TRAZODONE HYDROCHLORIDE 100 MG/1
100 TABLET ORAL
Qty: 180 | Refills: 3 | Status: ACTIVE | COMMUNITY
Start: 2020-11-21 | End: 1900-01-01

## 2022-09-15 RX ORDER — METOPROLOL TARTRATE 25 MG/1
25 TABLET, FILM COATED ORAL DAILY
Qty: 30 | Refills: 5 | Status: ACTIVE | COMMUNITY
Start: 2019-10-16 | End: 1900-01-01

## 2022-09-15 RX ORDER — AMOXICILLIN AND CLAVULANATE POTASSIUM 875; 125 MG/1; MG/1
875-125 TABLET, COATED ORAL
Qty: 14 | Refills: 0 | Status: DISCONTINUED | COMMUNITY
Start: 2022-09-14 | End: 2022-09-15

## 2022-09-15 RX ORDER — LISINOPRIL 5 MG/1
5 TABLET ORAL
Qty: 90 | Refills: 3 | Status: DISCONTINUED | COMMUNITY
Start: 2022-09-14 | End: 2022-09-15

## 2022-09-15 NOTE — COUNSELING
[Normal Weight - ( BMI  <25 )] : normal weight - ( BMI  <25 ) [Continue diet as tolerated] : continue diet as tolerated based on goals of care [Non - Smoker] : non-smoker [] : eye exam [Minimize unnecessary interventions] : minimize unnecessary interventions [Maintain functional ability] : maintain functional ability [Discussed disease trajectory with patient/caregiver] : discussed disease trajectory with patient/caregiver [Patient/Caregiver has ___ understanding of disease process] : patient/caregiver has [unfilled] understanding of disease process [Advanced Directives discussed: ____] : Advanced directives discussed: [unfilled] [Annual Discussion and review of: ___] : Annual discussion and review of [unfilled] [Completed DNR] : completed DNR [Completed Medical Orders for Life-Sustaining Treatment] : completed medical orders for life-sustaining treatment [DNR] : Code Status: DNR [Comfort] : Treatment Guidelines: Comfort [DNI] : Intubation: DNI [[facility]: _____[facility]last [facility]: [unfilled]

## 2022-09-18 ENCOUNTER — TRANSCRIPTION ENCOUNTER (OUTPATIENT)
Age: 87
End: 2022-09-18

## 2022-09-19 ENCOUNTER — LABORATORY RESULT (OUTPATIENT)
Age: 87
End: 2022-09-19

## 2022-09-19 PROBLEM — I50.20 HFREF (HEART FAILURE WITH REDUCED EJECTION FRACTION): Status: ACTIVE | Noted: 2022-09-19

## 2022-09-19 PROBLEM — G30.9 ALZHEIMER'S DEMENTIA WITH BEHAVIORAL DISTURBANCE, UNSPECIFIED TIMING OF DEMENTIA ONSET: Status: ACTIVE | Noted: 2020-10-16

## 2022-09-19 PROBLEM — I48.91 AFIB: Status: ACTIVE | Noted: 2022-09-19

## 2022-09-19 PROBLEM — F33.1 MODERATE EPISODE OF RECURRENT MAJOR DEPRESSIVE DISORDER: Status: ACTIVE | Noted: 2020-10-16

## 2022-09-19 NOTE — HEALTH RISK ASSESSMENT
[HRA Reviewed] : Health risk assessment reviewed [Independent] : feeding [Some assistance needed] : using telephone [Full assistance needed] : managing finances [No falls in past year] : Patient reported no falls in the past year [No] : The patient does not have visual impairment [TimeGetUpGo] : 45 [de-identified] : using walker (per dtr typically declines use of walker and walks faster without it)

## 2022-09-19 NOTE — PHYSICAL EXAM
[No Acute Distress] : no acute distress [Well Developed] : well developed [Normal Sclera/Conjunctiva] : normal sclera/conjunctiva [PERRL] : pupils equal, round and reactive to light [EOMI] : extra ocular movement intact [Normal Outer Ear/Nose] : the ears and nose were normal in appearance [No JVD] : no jugular venous distention [Supple] : the neck was supple [No Respiratory Distress] : no respiratory distress [Clear to Auscultation] : lungs were clear to auscultation bilaterally [No Accessory Muscle Use] : no accessory muscle use [Normal Rate] : heart rate was normal  [Regular Rhythm] : with a regular rhythm [Normal S1, S2] : normal S1 and S2 [No Murmurs] : no murmurs heard [No Edema] : there was no peripheral edema [Normal Appearance] : normal in appearance [No Nipple Discharge] : no nipple discharge [Normal Bowel Sounds] : normal bowel sounds [Non Tender] : non-tender [Soft] : abdomen soft [Not Distended] : not distended [No Masses] : no abdominal mass palpated [No Skin Lesions] : no skin lesions [Cranial Nerves Intact] : cranial nerves 2-12 were intact [No Joint Swelling] : no joint swelling seen [No Clubbing, Cyanosis] : no clubbing  or cyanosis of the fingernails [de-identified] : has temporal wasting- new [de-identified] : Hard of hearing; would not open mouth for exam  [de-identified] : Few senile purpura chest; [de-identified] : A+O x 1- 2

## 2022-09-19 NOTE — HISTORY OF PRESENT ILLNESS
[Patient] : patient [Formal Caregiver] : formal caregiver [FreeTextEntry1] : Gait instability, dementia [FreeTextEntry2] : Patient denies fever, cough, trouble breathing, rash, vomiting and diarrhea. Patient has not been in close contact with someone covid positive. \par surgical mask, eye wear used during visit: [Y]. Total face to face time with patient is [30] min.\par \par PMH: mild dementia (since 2001, declined medications for this), HTN, anxiety/depression (not on medications, was on xanax in past but had adverse reaction), CAD s/p 2 stents (2003, [facility], not on ASA or Plavix), gait instability (uses walker), hearing impairment (declined hearing aides), \par \par JAMAR HOLLIDAY is being seen after discharge home from [facility]. She  was admitted on 08/30/2022 for dental infection and discharged home on 09/12/2022. \par Post-discharge contact was made within 2 days of discharge home.\par Discharge medications were reviewed and reconciled with the current medication list and medications in home.\par \par Alert to name, pleasant today but  has lost significant amount if weight since last visit- now with temporal wasting\par Family took pt home from [facility] as they felt it was unsafe\par Not eating or drinking- will only eat Ensure\par Refusing to take medications\par Has been agitated since being home - daughter gave 200mg trazodone which knocked her out- will decrease to 50-100mg at HS\par BP was high in hospital- 140/80 today - continue metoprolol if she will take it \par Discussed hospice but daughter would like to hold off for now as she is starting PT with [facility] - pt now only able to walk a few feet with max assistance- was ambulating through house previously\par Continent of bowel & bladder\par Has no skin breakdown \par \par Dental infection: multiple teeth extracted- treated with Unasyn \par \par Afib: present during hospital- on metoprolol; given lovenox in hospital - not sent home on AC secondary to age, fall risk\par \par HFrEF: BNP 12416 on 9/1, Echo w/ EF 32%- has no SOB or edema today - not on diuretic\par \par Dementia w/ gitation: as above; pt with agitation in the beginning of 2021 and in patient psych admission was recommended but family refused\par \par HTN: on metoprolol which she will take in the am

## 2022-09-19 NOTE — REVIEW OF SYSTEMS
[Depression] : depression [Negative] : Integumentary [FreeTextEntry2] : as noted in HPI [FreeTextEntry4] : as noted in HPI [FreeTextEntry5] : as noted in HPI [de-identified] : as noted in HPI [FreeTextEntry1] : ROS w/ daughter

## 2022-09-19 NOTE — DATA REVIEWED
[FreeTextEntry1] : Patient JAMAR HOLLIDAY Invision MRN [id number] Hospital Visit  [facility] - Attending Physician Noel Tong \par Status Complete \par  \par \par Hospital Course: \par Discharge Date	12-Sep-2022 \par \par Admission Date	30-Aug-2022 18:19 \par Reason for Admission	Tooth infection \par Hospital Course	 \par \par \par \par \par 89F Dementia, CAD/Stent, HTN, p/w Rt lower teeth extraction by [facility] c/b urinary \par retention s/p dyer (8/31), 4.8cm AAA ? no further w/u as per fam, New Afib w/ \par RVR \par \par  Nutritional Assessment: \par - Nutritional Assessment	This patient has been assessed with a concern for \par Malnutrition and has been determined to have a diagnosis/diagnoses of Severe \par protein-calorie malnutrition. \par \par This patient is being managed with: \par Diet Minced and Moist- \par Supplement Feeding Modality:  Oral \par Ensure Enlive Cans or Servings Per Day:  3   Frequency:  Daily \par Entered: Sep  7 2022  2:21PM \par \par  Problem/Plan - 1: \par -  Problem: Tooth infection. \par -  Plan: s/p extraction \par - continue Unsayn IV for now - can transition to PO on discharge. Will clarify \par the duration of treatment. \par - case d/w HCP Randi agreeable to no further labs given advanced age and \par lack of compliance would like antibiotics/meds offered but if refuses would not \par force or place alternate forms of administration. \par \par  Problem/Plan - 2: \par -  Problem: Atrial fibrillation. \par -  Plan: Family reports NO history of afib on am of 9/2 when discussing. \par Updated them that we will need to work this up further prior to OR.  She does \par not follow regularly w cardiology. Does have a hx of stents in 2003, not on \par asa/statin. Takes metop sux 25 mg daily. Currently does not appear to be in a \par HF exacerbation-->not dyspneic, no hypoxia \par - AC (9/2- ), therapeutic lovenox 1mg/kg q 12 hrs; hold 24 hours prior to \par dental procedure, HOLD on 9/5 * R/B discussion w family for long term AC \par - Resumed metop sux 25 mg daily PO--> transition to metop tartrate for tighter \par titration while hospitalized, f/u cards rec's --> incr to metop tartrate 25 mg \par TID, cautious about up titration given recurrent episodes of SVR on tele, D/w \par cards [ ] \par - TTE --> 9/2 moderate AR, severely dilated RA and LA, preserved EF \par - Goal mag of 2, goal K of 4 \par - F/u EKG, trop- peaked at 40s, pro BNP- 15k \par - GOC discussion about A/C - discussed with daughter Randi and went over the \par pros and cons - prefer not to be on A/C due to bleeding risk from fall/frailty \par and fact that she's already very difficult. in terms of being compliant with PO \par meds at home. \par - discontinue tele. \par \par  Problem/Plan - 3: \par -  Problem: Dementia. \par -  Plan: AOx1, more anxious and declining over the last year per daughter. Will \par need placement after extraction. will consult social work. \par -daughter reports pt is on trazodone 150mg qhs \par - will add seroquel for qHS \par - currently not tolerating PO intake - will evaluate whether pain is potential \par source for her PO refusal.  However patient is very non-compliant with PO meds \par at home at baseline. \par - no overt compliant of pain calm and redirectable as per staff. \par \par  Problem/Plan - 4: \par -  Problem: CAD (coronary artery disease). \par -  Plan: s/p stents in 2003. no chest pain reported. Per daughter, pt can \par ambulate well without any CP nor SOB. no use of asa or blood thinners at home \par -on metoprolol succinate 25 mg daily-- changed to tartrate. \par \par  Problem/Plan - 5: \par -  Problem: Urinary retention. \par -  Resolved \par \par Dispo: Stable for DC to Rehab 9/12.  d/W attending. \par \par Med Reconciliation: \par Override IMPROVE-DD recommendations due to:	IMPROVE-DD Application Not \par Available \par Recommended Post-Discharge VTE Prophylaxis	IMPROVE-DD Application Not Available \par Medication Reconciliation Status	Admission Reconciliation is Completed \par Discharge Reconciliation is Completed \par \par Click to Modify Medication Indication on Note Save \par Discharge Medications	acetaminophen 325 mg oral tablet: 2 tab(s) orally every 6 \par hours, As needed, Mild Pain (1 - 3), Moderate Pain (4 - 6) \par bisacodyl 5 mg oral delayed release tablet: 1 tab(s) orally once a day (at \par bedtime) \par lisinopril 5 mg oral tablet: 1 tab(s) orally once a day \par metoprolol tartrate 50 mg oral tablet: 1 tab(s) orally every 8 hours \par oxyCODONE: 2.5 milligram(s) orally every 6 hours, As Needed for pain \par polyethylene glycol 3350 oral powder for reconstitution: 17 gram(s) orally once \par a day \par QUEtiapine: 12.5 milligram(s) orally once a day (at bedtime) \par traZODone 100 mg oral tablet: 1 tab(s) orally once a day (at bedtime) \par \par , \par , \par \par Care Plan/Procedures: \par Discharge Diagnoses, Assessment and Plan of Treatment	PRINCIPAL DISCHARGE \par DIAGNOSIS \par Diagnosis: Tooth infection \par Assessment and Plan of Treatment: You were Found to have anfected teeth, You \par underwent several extractions (Removal of teeth) You were treated with IV \par antibiotics, now transitioned to oral antibiotics \par Please Continue with : \par - Peridex 15ml BID \par -Augmentin BID x 1 more day \par - Patient can advance to a soft food diet. \par - Head of bed elevated 30 degrees \par - Avoid suctioning of mouth, or sucking through a straw \par - Avoid retraction of lips \par - Patient should follow up as an outpatient at [facility] in one week. \par \par \par SECONDARY DISCHARGE DIAGNOSES \par Diagnosis: Dementia \par Assessment and Plan of Treatment: \par \par Diagnosis: CAD (coronary artery disease) \par Assessment and Plan of Treatment: Continue with Your Current Medications, \par Follow up with Your PMD in 1 week \par \par Diagnosis: Atrial fibrillation \par Assessment and Plan of Treatment: C/W with Metoprolol \par - TTE --> 9/2 moderate AR, severely dilated RA and LA, preserved EF \par - GOC discussion about A/C - discussed with daughter Randi and went over the \par pros and cons - prefer not to be on A/C due to bleeding risk from fall/frailty \par and fact that she's already very difficult in terms of being compliant with PO \par meds at home. \par \par Goal(s)	To get better and follow your care plan as instructed. \par \par Follow Up: \par Care Providers for Follow up (PCP/Outpatient Provider)	Primary Care doctor, \par Please Follow up with Your Primary Care Doctor in 1-2 week \par Phone: ( )  - \par Fax: (   - \par Follow Up Time: \par Additional Scheduled Appointments	Please follow up with [facility] in 1 week \par Address: [address] [place] [address] \par Phone: [phone] \par \par Discharge Diet	Minced and Moist Diet \par Activity	No restrictions \par Additional Instructions	SOFT Diet \par - Peridex 15ml BID \par - Patient can advance to a soft food diet. \par - Head of bed elevated 30 degrees \par - Avoid suctioning of mouth, or sucking through a straw \par - Avoid retraction of lips \par - Patient should follow up as an outpatient at [facility] in one week. \par \par Quality Measures: \par Patient Condition	Stable \par Hospice Patient	No \par Does the patient have difficulty running errands alone like visiting a doctors \par office or shopping?	No \par Does the patient have difficulty climbing stairs?	No \par Cognition: The patient has	Difficulty concentrating \par Does the patient have a principal diagnosis of ischemic stroke, hemorrhagic \par stroke, or TIA?	No \par Does the patient have a principal diagnosis of Acute Myocardial Infarction?	No \par Has the patient had a Percutaneous Coronary Intervention?	No \par Did the Patient Present With or was Treated for Malnutrition During This \par Admission	Yes... \par Details of Malnutrition Diagnosis/Diagnoses	This patient has been assessed with \par a concern for Malnutrition and was treated during this hospitalization for the \par following Nutrition diagnosis/diagnoses: \par \par  -  09/07/2022: Severe protein-calorie malnutrition \par \par Document Complete: \par Care Provider Seen in Hospital	Rome Tripathi \par Physician Section Complete	This document is complete and the patient is ready \par for discharge. \par For questions about your prescriptions, please call:	[phone] \par Is this contact telephone number correct?	Yes \par Attending Attestation Statement	I have personally seen and examined the \par patient. I have collaborated with and supervised the \par .	on the discharge service for the patient. I have reviewed and made amendments \par to the documentation where necessary. \par \par \par \par Electronic Signatures: \par DeuceRome S (MD) (Signed 13-Sep-2022 08:21) \par 	Co-Signer: Hospital Course, Med Reconciliation, Care Plan/Procedures, Follow \par Up, Quality Measures, Covid Information, Document Complete \par Darlene Goss (PA) (Signed 08-Sep-2022 12:23) \par 	Authored: Hospital Course, Med Reconciliation, Care Plan/Procedures, Follow \par Up, Quality Measures, Document Complete \par Kristen White) (Signed 12-Sep-2022 16:49) \par 	Entered: Care Plan/Procedures \par 	Authored: Hospital Course, Med Reconciliation, Care Plan/Procedures, Follow \par Up, Quality Measures, Covid Information, Document Complete \par Noel Tong) (Signed 18-Sep-2022 20:01) \par 	Co-Signer: Hospital Course, Med Reconciliation, Care Plan/Procedures, Follow \par Up, Quality Measures, Document Complete \par \par Last Updated: 18-Sep-2022 20:01 by Noel Tong) \par

## 2022-10-17 ENCOUNTER — RX RENEWAL (OUTPATIENT)
Age: 87
End: 2022-10-17

## 2023-01-17 ENCOUNTER — RX RENEWAL (OUTPATIENT)
Age: 88
End: 2023-01-17

## 2023-04-25 NOTE — CHRONIC CARE ASSESSMENT
Assumed care of patient. Verbal shift change report given to Pedro Pavon RN (oncoming nurse) by Kim Rubi RN (offgoing nurse). Report included the following information SBAR, ED Summary, MAR, and Recent Results. Pt resting quietly without complaint. 1:1 present with continuous line of sight of patient. [PPS Score: ____] : Palliative Performance Scale (PPS) Score: [unfilled] [FAST Score: ____] : Functional Assessment Scale (FAST) Score: [unfilled] [Resistant to using DME in place] : resistant to using DME in place [3 or More Times Per Week] : exercises 3 or more times per week [Walking] : walking

## 2023-10-17 RX ORDER — METOPROLOL TARTRATE 50 MG
0 TABLET ORAL
Qty: 0 | Refills: 0 | DISCHARGE

## 2023-10-17 RX ORDER — TRAZODONE HCL 50 MG
2 TABLET ORAL
Qty: 0 | Refills: 0 | DISCHARGE

## 2023-10-17 RX ORDER — METOPROLOL TARTRATE 50 MG
1 TABLET ORAL
Qty: 0 | Refills: 0 | DISCHARGE

## 2023-10-17 RX ORDER — TRAZODONE HCL 50 MG
0 TABLET ORAL
Qty: 0 | Refills: 0 | DISCHARGE
